# Patient Record
Sex: MALE | Race: WHITE | Employment: OTHER | URBAN - METROPOLITAN AREA
[De-identification: names, ages, dates, MRNs, and addresses within clinical notes are randomized per-mention and may not be internally consistent; named-entity substitution may affect disease eponyms.]

---

## 2017-01-18 RX ORDER — PRAVASTATIN SODIUM 40 MG
TABLET ORAL
Qty: 90 TABLET | Refills: 2 | Status: SHIPPED | OUTPATIENT
Start: 2017-01-18 | End: 2017-10-11 | Stop reason: SDUPTHER

## 2017-03-13 ENCOUNTER — HOSPITAL ENCOUNTER (OUTPATIENT)
Dept: ULTRASOUND IMAGING | Age: 82
Discharge: HOME | End: 2017-03-13
Attending: FAMILY MEDICINE | Admitting: FAMILY MEDICINE

## 2017-03-13 DIAGNOSIS — N18.30 CHRONIC KIDNEY DISEASE, STAGE III (MODERATE) (HCC): ICD-10-CM

## 2017-03-13 DIAGNOSIS — N18.3 CRD (CHRONIC RENAL DISEASE), STAGE 3 (MODERATE): ICD-10-CM

## 2017-04-06 ENCOUNTER — OFFICE VISIT (OUTPATIENT)
Dept: CARDIOLOGY CLINIC | Age: 82
End: 2017-04-06

## 2017-04-06 VITALS
SYSTOLIC BLOOD PRESSURE: 134 MMHG | DIASTOLIC BLOOD PRESSURE: 62 MMHG | BODY MASS INDEX: 24.05 KG/M2 | HEART RATE: 70 BPM | HEIGHT: 70 IN | WEIGHT: 168 LBS

## 2017-04-06 DIAGNOSIS — I10 ESSENTIAL HYPERTENSION: ICD-10-CM

## 2017-04-06 DIAGNOSIS — I25.10 CORONARY ARTERY DISEASE INVOLVING NATIVE CORONARY ARTERY OF NATIVE HEART WITHOUT ANGINA PECTORIS: Primary | ICD-10-CM

## 2017-04-06 DIAGNOSIS — E78.00 HYPERCHOLESTEREMIA: ICD-10-CM

## 2017-04-06 PROCEDURE — G8598 ASA/ANTIPLAT THER USED: HCPCS | Performed by: INTERNAL MEDICINE

## 2017-04-06 PROCEDURE — 1123F ACP DISCUSS/DSCN MKR DOCD: CPT | Performed by: INTERNAL MEDICINE

## 2017-04-06 PROCEDURE — G8420 CALC BMI NORM PARAMETERS: HCPCS | Performed by: INTERNAL MEDICINE

## 2017-04-06 PROCEDURE — 99214 OFFICE O/P EST MOD 30 MIN: CPT | Performed by: INTERNAL MEDICINE

## 2017-04-06 PROCEDURE — 1036F TOBACCO NON-USER: CPT | Performed by: INTERNAL MEDICINE

## 2017-04-06 PROCEDURE — G8427 DOCREV CUR MEDS BY ELIG CLIN: HCPCS | Performed by: INTERNAL MEDICINE

## 2017-04-06 PROCEDURE — 4040F PNEUMOC VAC/ADMIN/RCVD: CPT | Performed by: INTERNAL MEDICINE

## 2017-04-06 RX ORDER — LIDOCAINE AND PRILOCAINE 25; 25 MG/G; MG/G
CREAM TOPICAL
Refills: 5 | COMMUNITY
Start: 2017-03-23 | End: 2018-03-30 | Stop reason: ALTCHOICE

## 2017-04-06 RX ORDER — VARDENAFIL HCL 10 MG
TABLET,DISINTEGRATING ORAL
Refills: 4 | COMMUNITY
Start: 2017-02-27 | End: 2020-12-02 | Stop reason: ALTCHOICE

## 2017-05-12 ENCOUNTER — TELEPHONE (OUTPATIENT)
Dept: ORTHOPEDIC SURGERY | Age: 82
End: 2017-05-12

## 2017-07-19 ENCOUNTER — TELEPHONE (OUTPATIENT)
Dept: CARDIOLOGY CLINIC | Age: 82
End: 2017-07-19

## 2017-07-27 ENCOUNTER — HOSPITAL ENCOUNTER (OUTPATIENT)
Dept: NON INVASIVE DIAGNOSTICS | Age: 82
Discharge: HOME | End: 2017-07-27
Attending: FAMILY MEDICINE | Admitting: FAMILY MEDICINE

## 2017-07-27 DIAGNOSIS — R22.2 SWELLING, MASS, OR LUMP IN CHEST: ICD-10-CM

## 2017-07-27 LAB
LV EF: 58 %
LVEF MODALITY: NORMAL

## 2017-09-20 ENCOUNTER — TELEPHONE (OUTPATIENT)
Dept: CARDIOLOGY CLINIC | Age: 82
End: 2017-09-20

## 2017-10-11 ENCOUNTER — OFFICE VISIT (OUTPATIENT)
Dept: CARDIOLOGY CLINIC | Age: 82
End: 2017-10-11

## 2017-10-11 VITALS
BODY MASS INDEX: 23.77 KG/M2 | WEIGHT: 166 LBS | HEIGHT: 70 IN | OXYGEN SATURATION: 98 % | DIASTOLIC BLOOD PRESSURE: 78 MMHG | SYSTOLIC BLOOD PRESSURE: 124 MMHG | HEART RATE: 68 BPM

## 2017-10-11 DIAGNOSIS — I10 ESSENTIAL HYPERTENSION, BENIGN: ICD-10-CM

## 2017-10-11 DIAGNOSIS — E78.5 OTHER AND UNSPECIFIED HYPERLIPIDEMIA: ICD-10-CM

## 2017-10-11 DIAGNOSIS — I25.10 ATHEROSCLEROSIS OF NATIVE CORONARY ARTERY OF NATIVE HEART WITHOUT ANGINA PECTORIS: Primary | ICD-10-CM

## 2017-10-11 PROCEDURE — G8420 CALC BMI NORM PARAMETERS: HCPCS | Performed by: NURSE PRACTITIONER

## 2017-10-11 PROCEDURE — 93000 ELECTROCARDIOGRAM COMPLETE: CPT | Performed by: NURSE PRACTITIONER

## 2017-10-11 PROCEDURE — 1036F TOBACCO NON-USER: CPT | Performed by: NURSE PRACTITIONER

## 2017-10-11 PROCEDURE — G8427 DOCREV CUR MEDS BY ELIG CLIN: HCPCS | Performed by: NURSE PRACTITIONER

## 2017-10-11 PROCEDURE — 4040F PNEUMOC VAC/ADMIN/RCVD: CPT | Performed by: NURSE PRACTITIONER

## 2017-10-11 PROCEDURE — 99214 OFFICE O/P EST MOD 30 MIN: CPT | Performed by: NURSE PRACTITIONER

## 2017-10-11 PROCEDURE — G8484 FLU IMMUNIZE NO ADMIN: HCPCS | Performed by: NURSE PRACTITIONER

## 2017-10-11 PROCEDURE — G8598 ASA/ANTIPLAT THER USED: HCPCS | Performed by: NURSE PRACTITIONER

## 2017-10-11 PROCEDURE — 1123F ACP DISCUSS/DSCN MKR DOCD: CPT | Performed by: NURSE PRACTITIONER

## 2017-10-11 RX ORDER — PRAVASTATIN SODIUM 40 MG
20 TABLET ORAL DAILY
Qty: 90 TABLET | Refills: 3 | Status: SHIPPED | OUTPATIENT
Start: 2017-10-11 | End: 2018-12-07 | Stop reason: SDUPTHER

## 2017-10-11 NOTE — PROGRESS NOTES
 lamotrigine (LAMICTAL) 100 MG tablet Take 100 mg by mouth 2 times daily       lithium 300 MG capsule Take 300 mg by mouth 2 times daily (with meals). No current facility-administered medications for this visit. REVIEW OF SYSTEMS:   CONSTITUTIONAL: No major weight gain or loss, fatigue, weakness, night sweats or fever. There's been no change in energy level, sleep pattern, or activity level. HEENT: No new vision difficulties or ringing in the ears. RESPIRATORY: No new SOB, PND, orthopnea or cough. CARDIOVASCULAR: See HPI  GI: No nausea, vomiting, diarrhea, constipation, abdominal pain or changes in bowel habits. : No urinary frequency, urgency, incontinence hematuria or dysuria. SKIN: No cyanosis or skin lesions. MUSCULOSKELETAL: No new muscle or joint pain. NEUROLOGICAL: No syncope or TIA-like symptoms. PSYCHIATRIC: No anxiety, pain, insomnia or depression    Objective:   PHYSICAL EXAM:        VITALS:    Vitals:    10/11/17 1008   BP: 124/78   Pulse: 68   SpO2: 98%         CONSTITUTIONAL: Cooperative, no apparent distress, and appears well nourished / developed  NEUROLOGIC:  Awake and orientated to person, place and time. PSYCH: Calm affect. SKIN: Warm and dry. HEENT: Sclera non-icteric, normocephalic, neck supple, no elevation of JVP, normal carotid pulses with no bruits and thyroid normal size. LUNGS:  No increased work of breathing and clear to auscultation, no crackles or wheezing. CARDIOVASCULAR:  Regular rate and rhythm with no murmurs, gallops, rubs, or abnormal heart sounds, normal PMI. The apical impulses not displaced.  Heart tones are crisp and normal. Cervical veins are not engorged                 JVP less than 8 cm H2O                                                                              The carotid upstroke is normal in amplitude and contour without delay or bruit    ABDOMEN:  Normal bowel sounds, non-distended and non-tender to palpation   EXT: No edema, no calf tenderness. Pulses are present bilaterally. DATA:    Lab Results   Component Value Date    ALT 13 02/15/2017    AST 17 02/15/2017    ALKPHOS 76 12/21/2015    BILITOT <0.2 12/21/2015     Lab Results   Component Value Date    CREATININE 1.27 02/15/2017    BUN 15 02/15/2017     02/15/2017    K 4.3 02/15/2017     02/15/2017    CO2 31 02/15/2017     Lab Results   Component Value Date    TSH 3.29 12/21/2015     Lab Results   Component Value Date    WBC 11.6 (H) 03/27/2014    HGB 14.1 03/27/2014    HCT 42.9 03/27/2014    MCV 93.7 03/27/2014     03/27/2014     No components found for: CHLPL  Lab Results   Component Value Date    TRIG 138 02/15/2017    TRIG 111 03/09/2015    TRIG 193 (H) 11/01/2013     Lab Results   Component Value Date    HDL 48 (L) 02/15/2017    HDL 60 03/09/2015    HDL 49 11/01/2013     Lab Results   Component Value Date    LDLCALC 55 02/15/2017    LDLCALC 63 03/09/2015    LDLCALC 48 11/01/2013     Lab Results   Component Value Date    LABVLDL 22 03/09/2015    LABVLDL 39 11/01/2013    LABVLDL 17 03/16/2013     EKG: 10/11/17 SR with 1st degree block reviewed by me  Assessment:     ~ Coronary artery disease  9/09 MVD- s/p CABG  1/12 Myoview stress test showed normal findings and normal LV function  On Lopressor, ASA, and Pravastatin   Patient has had chest pain with exercise for two weeks  Plan: schedule Myoview stress test    ~ Hypertension  Today's B/P- 124/78  Stable  Continue current medications    ~Hyperlipidemia  On Pravastatin  2/2017: HDL: 48, LDL: 55  Patient  is stable since     Plan:   Schedule Myoview stress test   Decrease Pravastatin to 20 mg daily due to patient request  Follow up in six months/ test dependent    I have addressed the patient's cardiac risk factors and adjusted pharmacologic treatment as needed. In addition, I have reinforced the need for patient directed risk factor modification.     Further evaluation will be based upon the patient's clinical course and testing results. All questions and concerns were addressed to the patient/family. Alternatives to  treatment were discussed. The patient  currently  is not smoking. The risks related to smoking were reviewed with the patient. Recommend maintaining a smoke-free lifestyle. Products available for smoking cessation were discussed. Thank you for allowing to us to participate in the care of Carlos Mccall CNP

## 2017-10-11 NOTE — COMMUNICATION BODY
 lamotrigine (LAMICTAL) 100 MG tablet Take 100 mg by mouth 2 times daily       lithium 300 MG capsule Take 300 mg by mouth 2 times daily (with meals). No current facility-administered medications for this visit. REVIEW OF SYSTEMS:   CONSTITUTIONAL: No major weight gain or loss, fatigue, weakness, night sweats or fever. There's been no change in energy level, sleep pattern, or activity level. HEENT: No new vision difficulties or ringing in the ears. RESPIRATORY: No new SOB, PND, orthopnea or cough. CARDIOVASCULAR: See HPI  GI: No nausea, vomiting, diarrhea, constipation, abdominal pain or changes in bowel habits. : No urinary frequency, urgency, incontinence hematuria or dysuria. SKIN: No cyanosis or skin lesions. MUSCULOSKELETAL: No new muscle or joint pain. NEUROLOGICAL: No syncope or TIA-like symptoms. PSYCHIATRIC: No anxiety, pain, insomnia or depression    Objective:   PHYSICAL EXAM:        VITALS:    Vitals:    10/11/17 1008   BP: 124/78   Pulse: 68   SpO2: 98%         CONSTITUTIONAL: Cooperative, no apparent distress, and appears well nourished / developed  NEUROLOGIC:  Awake and orientated to person, place and time. PSYCH: Calm affect. SKIN: Warm and dry. HEENT: Sclera non-icteric, normocephalic, neck supple, no elevation of JVP, normal carotid pulses with no bruits and thyroid normal size. LUNGS:  No increased work of breathing and clear to auscultation, no crackles or wheezing. CARDIOVASCULAR:  Regular rate and rhythm with no murmurs, gallops, rubs, or abnormal heart sounds, normal PMI. The apical impulses not displaced.  Heart tones are crisp and normal. Cervical veins are not engorged                 JVP less than 8 cm H2O                                                                              The carotid upstroke is normal in amplitude and contour without delay or bruit    ABDOMEN:  Normal bowel sounds, non-distended and non-tender to palpation   EXT: No edema, no calf tenderness. Pulses are present bilaterally. DATA:    Lab Results   Component Value Date    ALT 13 02/15/2017    AST 17 02/15/2017    ALKPHOS 76 12/21/2015    BILITOT <0.2 12/21/2015     Lab Results   Component Value Date    CREATININE 1.27 02/15/2017    BUN 15 02/15/2017     02/15/2017    K 4.3 02/15/2017     02/15/2017    CO2 31 02/15/2017     Lab Results   Component Value Date    TSH 3.29 12/21/2015     Lab Results   Component Value Date    WBC 11.6 (H) 03/27/2014    HGB 14.1 03/27/2014    HCT 42.9 03/27/2014    MCV 93.7 03/27/2014     03/27/2014     No components found for: CHLPL  Lab Results   Component Value Date    TRIG 138 02/15/2017    TRIG 111 03/09/2015    TRIG 193 (H) 11/01/2013     Lab Results   Component Value Date    HDL 48 (L) 02/15/2017    HDL 60 03/09/2015    HDL 49 11/01/2013     Lab Results   Component Value Date    LDLCALC 55 02/15/2017    LDLCALC 63 03/09/2015    LDLCALC 48 11/01/2013     Lab Results   Component Value Date    LABVLDL 22 03/09/2015    LABVLDL 39 11/01/2013    LABVLDL 17 03/16/2013     EKG: 10/11/17 SR with 1st degree block reviewed by me  Assessment:     ~ Coronary artery disease  9/09 MVD- s/p CABG  1/12 Myoview stress test showed normal findings and normal LV function  On Lopressor, ASA, and Pravastatin   Patient has had chest pain with exercise for two weeks  Plan: schedule Myoview stress test    ~ Hypertension  Today's B/P- 124/78  Stable  Continue current medications    ~Hyperlipidemia  On Pravastatin  2/2017: HDL: 48, LDL: 55  Patient  is stable since     Plan:   Schedule Myoview stress test   Decrease Pravastatin to 20 mg daily due to patient request  Follow up in six months/ test dependent    I have addressed the patient's cardiac risk factors and adjusted pharmacologic treatment as needed. In addition, I have reinforced the need for patient directed risk factor modification.     Further evaluation will be based upon the patient's clinical course and testing results. All questions and concerns were addressed to the patient/family. Alternatives to  treatment were discussed. The patient  currently  is not smoking. The risks related to smoking were reviewed with the patient. Recommend maintaining a smoke-free lifestyle. Products available for smoking cessation were discussed. Thank you for allowing to us to participate in the care of Regla Mccall CNP

## 2017-10-11 NOTE — LETTER
[address] [facility]  [address] [address] [address] [address]  Phone: [phone]  Fax: [address], NP        October 11, 2017     Diane Irizarry, [address] [facility] [address]    Patient: Reginald Kang  MR Number: [id number]  YOB: 1935  Date of Visit: 10/11/2017    Dear Dr. Diane Irizarry:    Thank you for the request for consultation for Bianca Padmini to me for the evaluation of CAD. Below are the relevant portions of my assessment and plan of care. [facility]     Outpatient Follow Up Note    CHIEF COMPLAINT / HPI:  Follow Up secondary to coronary artery disease/ HTN/ Hyperlipidemia         Reginald Kang is 80 y.o. male who presents today for a routine follow up  related to the above mentioned issues. Subjective:   Since the time of last office visit, the patient admits their symptoms have changed. At today's visit patient is doing well. He has had chest pain with exercise for two weeks. Today's EKG showed SR with 1st degree block reviewed by me. He denies any  palpitations, SOB, dizziness, or edema. With regard to medication therapy the patient has been compliant with prescribed regimen. They have tolerated therapy to date.      Past Medical History:   Diagnosis Date    CAD (coronary artery disease) 2009    CABG    Diabetes insipidus ([address]     Hypercholesteremia     Hypertension      Social History:    History   Smoking Status    Never Smoker   Smokeless Tobacco    Never Used     Current Medications:  Current Outpatient Prescriptions   Medication Sig Dispense Refill    metoprolol tartrate (LOPRESSOR) 25 MG tablet TAKES 1/2 TABLET TWICE A DAY 90 tablet 3    STAXYN 10 MG TBDP PLACE 1 EACH UNDER THE TONGUE DAILY AS NEEDED.  4    lidocaine-prilocaine (EMLA) 2.5-2.5 % cream APPLY TOPICALLY AS NEEDED.  5    Influenza Vac Split High-Dose (FLUZONE HIGH-DOSE) 0.5 ML NADIYA injection TO BE ADMINISTERED BY PHARMACIST FOR IMMUNIZATION      pravastatin (PRAVACHOL) 40 MG tablet TAKE 1 TABLET BY MOUTH DAILY 90 tablet 2    levothyroxine (SYNTHROID) 25 MCG tablet Take 25 mcg by mouth Daily.  omeprazole (PRILOSEC) 20 MG capsule Take 40 mg by mouth 2 times daily       vitamin D (CHOLECALCIFEROL) 1000 UNIT TABS tablet Take 500 Units by mouth daily.  Multiple Vitamin (MULTIVITAMIN PO) Take  by mouth daily.  aspirin 81 MG EC tablet Take 81 mg by mouth daily.  gabapentin (NEURONTIN) 300 MG capsule Take 300 mg by mouth daily.  lamotrigine (LAMICTAL) 100 MG tablet Take 100 mg by mouth 2 times daily       lithium 300 MG capsule Take 300 mg by mouth 2 times daily (with meals). No current facility-administered medications for this visit. REVIEW OF SYSTEMS:   CONSTITUTIONAL: No major weight gain or loss, fatigue, weakness, night sweats or fever. There's been no change in energy level, sleep pattern, or activity level. HEENT: No new vision difficulties or ringing in the ears. RESPIRATORY: No new SOB, PND, orthopnea or cough. CARDIOVASCULAR: See HPI  GI: No nausea, vomiting, diarrhea, constipation, abdominal pain or changes in bowel habits. : No urinary frequency, urgency, incontinence hematuria or dysuria. SKIN: No cyanosis or skin lesions. MUSCULOSKELETAL: No new muscle or joint pain. NEUROLOGICAL: No syncope or TIA-like symptoms. PSYCHIATRIC: No anxiety, pain, insomnia or depression    Objective:   PHYSICAL EXAM:        VITALS:    Vitals:    10/11/17 1008   BP: 124/78   Pulse: 68   SpO2: 98%         CONSTITUTIONAL: Cooperative, no apparent distress, and appears well nourished / developed  NEUROLOGIC:  Awake and orientated to person, place and time. PSYCH: Calm affect. SKIN: Warm and dry. HEENT: Sclera non-icteric, normocephalic, neck supple, no elevation of JVP, normal carotid pulses with no bruits and thyroid normal size. LUNGS:  No increased work of breathing and clear to auscultation, no crackles or wheezing. CARDIOVASCULAR:  Regular rate and rhythm with no murmurs, gallops, rubs, or abnormal heart sounds, normal PMI. The apical impulses not displaced. Heart tones are crisp and normal. Cervical veins are not engorged                 JVP less than 8 cm H2O                                                                              The carotid upstroke is normal in amplitude and contour without delay or bruit    ABDOMEN:  Normal bowel sounds, non-distended and non-tender to palpation   EXT: No edema, no calf tenderness. Pulses are present bilaterally.     DATA:    Lab Results   Component Value Date    ALT 13 02/15/2017    AST 17 02/15/2017    ALKPHOS 76 12/21/2015    BILITOT <0.2 12/21/2015     Lab Results   Component Value Date    CREATININE 1.27 02/15/2017    BUN 15 02/15/2017     02/15/2017    K 4.3 02/15/2017     02/15/2017    CO2 31 02/15/2017     Lab Results   Component Value Date    TSH 3.29 12/21/2015     Lab Results   Component Value Date    WBC 11.6 (H) 03/27/2014    HGB 14.1 03/27/2014    HCT 42.9 03/27/2014    MCV 93.7 03/27/2014     03/27/2014     No components found for: CHLPL  Lab Results   Component Value Date    TRIG 138 02/15/2017    TRIG 111 03/09/2015    TRIG 193 (H) 11/01/2013     Lab Results   Component Value Date    HDL 48 (L) 02/15/2017    HDL 60 03/09/2015    HDL 49 11/01/2013     Lab Results   Component Value Date    LDLCALC 55 02/15/2017    LDLCALC 63 03/09/2015    LDLCALC 48 11/01/2013     Lab Results   Component Value Date    LABVLDL 22 03/09/2015    LABVLDL 39 11/01/2013    LABVLDL 17 03/16/2013     EKG: 10/11/17 SR with 1st degree block reviewed by me  Assessment:     ~ Coronary artery disease  9/09 MVD- s/p CABG  1/12 Myoview stress test showed normal findings and normal LV function  On Lopressor, ASA, and Pravastatin   Patient has had chest pain with exercise for two weeks Plan: schedule Myoview stress test    ~ Hypertension  Today's B/P- 124/78  Stable  Continue current medications    ~Hyperlipidemia  On Pravastatin  2/2017: HDL: 48, LDL: 55  Patient  is stable since     Plan:   Schedule Myoview stress test   Decrease Pravastatin to 20 mg daily due to patient request  Follow up in six months/ test dependent    I have addressed the patient's cardiac risk factors and adjusted pharmacologic treatment as needed. In addition, I have reinforced the need for patient directed risk factor modification. Further evaluation will be based upon the patient's clinical course and testing results. All questions and concerns were addressed to the patient/family. Alternatives to  treatment were discussed. The patient  currently  is not smoking. The risks related to smoking were reviewed with the patient. Recommend maintaining a smoke-free lifestyle. Products available for smoking cessation were discussed. Thank you for allowing to us to participate in the care of Eileenpadmini Olena. [address] [place] [address]      If you have questions, please do not hesitate to call me. I look forward to following Yin Moon along with you.     Sincerely,        Karma Cullen, DAX

## 2017-10-25 ENCOUNTER — HOSPITAL ENCOUNTER (OUTPATIENT)
Dept: NON INVASIVE DIAGNOSTICS | Age: 82
Discharge: HOME | End: 2017-10-25
Attending: NURSE PRACTITIONER | Admitting: NURSE PRACTITIONER

## 2017-10-25 DIAGNOSIS — R22.2 SWELLING, MASS, OR LUMP IN CHEST: ICD-10-CM

## 2017-10-25 LAB
LV EF: 74 %
LVEF MODALITY: NORMAL

## 2017-11-01 ENCOUNTER — HOSPITAL ENCOUNTER (OUTPATIENT)
Dept: PHYSICAL THERAPY | Age: 82
Discharge: HOME | End: 2017-11-30
Admitting: ORTHOPAEDIC SURGERY

## 2017-11-01 DIAGNOSIS — R22.2 SWELLING, MASS, OR LUMP IN CHEST: ICD-10-CM

## 2017-11-01 ASSESSMENT — PAIN SCALES - GENERAL: PAINLEVEL_OUTOF10: 1

## 2017-11-01 ASSESSMENT — PAIN DESCRIPTION - LOCATION: LOCATION: KNEE

## 2017-11-01 ASSESSMENT — PAIN DESCRIPTION - ORIENTATION: ORIENTATION: LEFT

## 2017-11-01 ASSESSMENT — PAIN DESCRIPTION - PAIN TYPE: TYPE: CHRONIC PAIN

## 2017-11-01 ASSESSMENT — PAIN DESCRIPTION - DESCRIPTORS: DESCRIPTORS: ACHING

## 2017-11-01 ASSESSMENT — PAIN DESCRIPTION - FREQUENCY: FREQUENCY: INTERMITTENT

## 2017-11-01 NOTE — FLOWSHEET NOTE
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 Physical Therapy Certification/Re-Certification Form  Dear Dr. Shanelle Almazan,  The following patient has been evaluated for physical therapy services. Please review the attached evaluation and/or summary of the patient's plan of care, and verify that you agree therapy should continue by signing the attached document and sending it back to our office. Plan of Care/Treatment to date:  [x] Therapeutic Exercise      [] Modalities:  [x] Therapeutic Activity        [] Ultrasound    [] Gait Training        [] Cervical Traction   [x] Neuromuscular Re-education      [] Cold/hotpack    [x] Instruction in HEP        [] Lumbar Traction  [] Manual Therapy        [] Electrical Stimulation            [] Aquatic Therapy        [] Iontophoresis        ? [] Lymphedema management  [] Women's Health     Other:  [] Vestibular Rehab        []    []  Needed     Frequency/Duration:  # Days per week: [x] 1 day # Weeks: [] 1 week [] 5 weeks     [] 2 days? [] 2 weeks [] 6 weeks     [] 3 days   [] 3 weeks [] 7 weeks     [] 4 days   [x] 4 weeks [] 8 weeks    Rehab Potential: [] Excellent [x] Good [] Fair  [] Poor     Electronically signed by:  Aiden Clemente PT    If you have any questions or concerns, please don't hesitate to call.   Thank you for your referral.      Physician Signature:________________________________Date:__________________  By signing above, therapists plan is approved by physician

## 2017-11-01 NOTE — PROGRESS NOTES
Tolerance: Patient Tolerated treatment well         Plan   Plan  Times per week: 1  Plan weeks: 4 weeks  Current Treatment Recommendations: Strengthening, Balance Training, Endurance Training, Home Exercise Program    G-Code  PT G-Codes  Functional Assessment Tool Used: LEFS  Score: 54  Functional Limitation: Mobility: Walking and moving around  Mobility: Walking and Moving Around Current Status (): At least 20 percent but less than 40 percent impaired, limited or restricted  Mobility: Walking and Moving Around Goal Status (): At least 1 percent but less than 20 percent impaired, limited or restricted    OutComes Score                                  LEFS Total Score: 54                  Goals  Long term goals  Time Frame for Long term goals : 4  Long term goal 1: Pt will be I in HEP to perform at home and at the gym  Long term goal 2: Pt's hip strength will improve to allow better gait and quicker return from surgery.    Patient Goals   Patient goals : improve leg strength       Therapy Time   Individual Concurrent Group Co-treatment   Time In 1339         Time Out 1429         Minutes 50         Timed Code Treatment Minutes: [address] [facility],

## 2017-11-01 NOTE — FLOWSHEET NOTE
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 Tracking Information:  Physician Information Referring Practitioner: Mansi Sood     Plan of Care Sent Date: 17 Signed Received:    Visit Count / Total Visits      Insurance Approved Visits  /  Approved Dates:     Insurance Information PT Insurance Information: medicare     Progress Note/G-codes   [x]  Yes  []  No Next Due:      Pain level: 1/10     Subjective: see eval     Objective:   Observation: Pt limping with decreased wt bearing on LLE   Test measurements:      Exercises:  Exercise/Equipment Resistance/Repetitions Other comments   Performed HEP as below. Other Therapeutic Activities: : discussed several exercises done on machines for future use. Will instruct in these next visit.       Home Exercise Program:  : quad sets, SLR x 3 lying down, SLB 3 x 30', HS/hip flexor stretch 3 x 30' Pt given written copy of these exercises     Manual Treatments:      Modalities:      Timed Code Treatment Minutes:  31    Total Treatment Minutes:  43    Treatment/Activity Tolerance:  [x] Patient tolerated treatment well [] Patient limited by fatigue  [] Patient limited by pain  [] Patient limited by other medical complications  [] Other:     Prognosis: [x] Good [] Fair  [] Poor    Patient Requires Follow-up: [x] Yes  [] No    Plan:   [] Continue per plan of care [] Alter current plan (see comments)  [x] Plan of care initiated [] Hold pending MD visit [] Discharge  Plan for Next Session:  Progress HEP in plex    Electronically signed by:  Judy Salas, PT

## 2017-11-15 LAB
ABO GROUPING: NORMAL
ANTIBODY SCREEN: NEGATIVE
BILIRUBIN, URINE: NEGATIVE
BUN BLDV-MCNC: 14 MG/DL (ref 8–26)
CALCIUM SERPL-MCNC: 10.8 MG/DL (ref 8.5–10.5)
CHLORIDE BLD-SCNC: 106 MEQ/L (ref 101–111)
CLARITY: CLEAR
CO2: 28 MEQ/L (ref 24–36)
COLOR: YELLOW
CREAT SERPL-MCNC: 1.31 MG/DL (ref 0.64–1.27)
GFR AFRICAN AMERICAN: >60 ML/MIN/1.73 SQ METER
GFR NON-AFRICAN AMERICAN: 53 ML/MIN/1.73 SQ METER
GLUCOSE BLD-MCNC: 98 MG/DL (ref 70–99)
GLUCOSE URINE: NEGATIVE
HB: SOURCE: ABNORMAL
HCT VFR BLD CALC: 42 % (ref 40–50)
HEMOGLOBIN: 13.9 G/DL (ref 13.5–16.5)
KETONES, URINE: NEGATIVE
LEUKOCYTE ESTERASE, URINE: NEGATIVE
MCH RBC QN AUTO: 31 PG (ref 27–33)
MCHC RBC AUTO-ENTMCNC: 33 G/DL (ref 32–36)
MCV RBC AUTO: 92 FL (ref 82–97)
NITRITE, URINE: NEGATIVE
OSMOLALITY CALCULATION: 282 MOSM/KG (ref 280–300)
PDW BLD-RTO: 13.8 %
PH, URINE: 7.5 (ref 5–8)
PLATELET # BLD: 241 THOU/MCL (ref 140–375)
POTASSIUM SERPL-SCNC: 4.4 MEQ/L (ref 3.6–5.1)
PROTEIN, URINE: NEGATIVE
RBC # BLD: 4.54 MIL/MCL (ref 4.4–5.8)
RBC URINE: NEGATIVE
SODIUM BLD-SCNC: 141 MEQ/L (ref 135–145)
SPECIFIC GRAVITY UA: 1 (ref 1–1.03)
UROBILINOGEN, URINE: <2 MG/DL
WBC # BLD: 10.4 THOU/MCL (ref 3.6–10.5)

## 2017-11-16 ENCOUNTER — TELEPHONE (OUTPATIENT)
Dept: CARDIOLOGY CLINIC | Age: 82
End: 2017-11-16

## 2017-11-16 NOTE — TELEPHONE ENCOUNTER
Please advise. . Last seen 10/11/17 NPKK:  Assessment:      ~ Coronary artery disease  9/09 MVD- s/p CABG  1/12 Myoview stress test showed normal findings and normal LV function  On Lopressor, ASA, and Pravastatin   Patient has had chest pain with exercise for two weeks  Plan: schedule Myoview stress test     ~ Hypertension  Today's B/P- 124/78  Stable  Continue current medications     ~Hyperlipidemia  On Pravastatin  2/2017: HDL: 48, LDL: 55  Patient  is stable since      Plan:   Schedule Myoview stress test   Decrease Pravastatin to 20 mg daily due to patient request  Follow up in six months/ test dependent

## 2017-11-21 ENCOUNTER — TELEPHONE (OUTPATIENT)
Dept: ORTHOPEDIC SURGERY | Age: 82
End: 2017-11-21

## 2017-12-01 ENCOUNTER — HOSPITAL ENCOUNTER (OUTPATIENT)
Dept: OTHER | Age: 82
Discharge: HOME | End: 2017-12-31
Attending: ORTHOPAEDIC SURGERY | Admitting: ORTHOPAEDIC SURGERY

## 2017-12-07 LAB — GLUCOSE BLD-MCNC: 118 MG/DL (ref 70–99)

## 2017-12-29 ENCOUNTER — HOSPITAL ENCOUNTER (OUTPATIENT)
Dept: PHYSICAL THERAPY | Age: 82
Discharge: HOME | End: 2017-12-31
Admitting: ORTHOPAEDIC SURGERY

## 2017-12-29 DIAGNOSIS — R22.2 SWELLING, MASS, OR LUMP IN CHEST: ICD-10-CM

## 2017-12-29 NOTE — PLAN OF CARE
3-4x / day. Other: pt was taken to ER for possible heart problem 10 days ago - severe knee pain when laying on gurney with knees straight (10/10) - pain eased once EMT placed L knee in slight flexion. Relevant Medical History:  Diabetes Insipidous  Quadruple Bypass  OA - knees, C-spine, L-spine  Chronic kidney disease (does not require dialysis)  Depression (at times)  Neuropathy    Functional Disability Index:PT G-Codes  Functional Assessment Tool Used: LEFS  Score: 24/80 = 70% LOF  Functional Limitation: Mobility: Walking and moving around  Mobility: Walking and Moving Around Current Status (): At least 60 percent but less than 80 percent impaired, limited or restricted  Mobility: Walking and Moving Around Goal Status (): At least 20 percent but less than 40 percent impaired, limited or restricted    Pain Scale: current = 4/10  Easing factors: ice, elevation, slight flexion  Provocative factors: knee extension, long sitting, walking     Type: [x]Constant   []Intermittent  []Radiating []Localized []other:     Numbness/Tingling: in feet (neuropathy)    Occupation/School: n/a    Living Status:   Lives alone: No   Stairs: yes   Handrails: yes    Prior Level of Function:Prior to this injury / incident, pt was independent with ADLs and IADLs, household chores, walking, stairs, kneeling and riding a bicycle. OBJECTIVE:   Palpation: general tenderness throughout L knee    Quad tone: fair    Functional Mobility/Transfers: requires assistance of R LE and / or UEs to lift L LE onto tx table. Very uncomfortable in long sitting. Posture: L knee flexion contracture. Bandages/Dressings/Incisions: Incisions clean and dry. No S&S of infection. Gait: pt arrives to PT amb w/ cane w/ moderate to severe antalgia: slow / methodical gait, stiff mechanics (limited in L knee flexion and extension), wide ROSANNA, and very short stride length.      Dermatomes Normal Abnormal Comments   anterior mid-thigh (L2) nt   distal ant thigh/med knee (L3) x     medial lower leg and foot (L4) x     lateral lower leg and foot (L5) x     posterior calf (S1) x     medial calcaneus (S2) x         Myotomes - NT due to recent surgery Normal Abnormal Comments   Hip flexion (L1-L2)      Knee extension (L2-L4)      Dorsiflexion (L4-L5)      Great Toe Ext (L5)      Ankle Eversion (S1-S2)      Ankle PF(S1-S2)          Reflexes - NT due to recent surgery Normal Abnormal Comments   S1-2 Seated achilles      S1-2 Prone knee bend      L3-4 Patellar tendon      Clonus      Babinski           PROM AROM    L R L R   Knee Flexion -10 nt nt 118   Knee Extension 76 EOB nt nt 0       Strength (0-5) - NT due to recent surgery Left Right   Hip Flexion - supine     Hip Flexion - seated     Hip Abduction     Hip Adduction     Quads     Hamstrings          Flexibility - NT due to recent surgery     Hamstrings (90/90)     ITB (Harmony)     Quads (Ely's)     Hip Flexor Cinthya Brandi)          Girth     Mid patella 44.5 39.5   Suprapatellar 43.2 38.0       Joint mobility: patellar   []Normal    [x]Hypo    []Hyper    Orthopedic Special Tests:    Funmi's (-)     Balance: NT due to recent surgery                           [x] Patient history, allergies, meds reviewed. Medical chart reviewed. See intake form. Review Of Systems (ROS):  [x]Performed Review of systems (Integumentary, CardioPulmonary, Neurological) by intake and observation. Intake form has been scanned into medical record. Patient has been instructed to contact their primary care physician regarding ROS issues if not already being addressed at this time.       Co-morbidities/Complexities (which will affect course of rehabilitation):   []None           Arthritic conditions   []Rheumatoid arthritis (M05.9)  [x]Osteoarthritis (M19.91)   Cardiovascular conditions   []Hypertension (I10)  []Hyperlipidemia (E78.5)  []Angina pectoris (I20)  []Atherosclerosis (I70)   Musculoskeletal conditions   []Disc pathology []Congenital spine pathologies   []Prior surgical intervention  []Osteoporosis (M81.8)  []Osteopenia (M85.8)   Endocrine conditions   []Hypothyroid (E03.9)  []Hyperthyroid Gastrointestinal conditions   []Constipation (C88.84)   Metabolic conditions   []Morbid obesity (E66.01)  []Diabetes type 1(E10.65) or 2 (E11.65)   [x]Neuropathy (G60.9)     Pulmonary conditions   []Asthma (J45)  []Coughing   []COPD (J44.9)   Psychological Disorders  []Anxiety (F41.9)  [x]Depression (F32.9)   []Other:   [x]Other:     Chronic kidney disease  Quadruple bypass  Diabetes insipidous  Considerable shaking / tremors of B UEs     Barriers to/and or personal factors that will affect rehab potential:              []Age  []Sex    []Smoker              []Motivation/Lack of Motivation                        [x]Co-Morbidities - see above              []Cognitive Function, education/learning barriers              []Environmental, home barriers              []profession/work barriers  []past PT/medical experience  [x]other: general deconditioning - fatigued very quickly with testing and exercises (even stretches)  Justification:     Falls Risk Assessment (30 days):   [x] Falls Risk assessed and no intervention required. [] Falls Risk assessed and Patient requires intervention due to being higher risk   TUG score (>12s at risk):     [] Falls education provided, including       G-Codes:  PT G-Codes  Functional Assessment Tool Used: LEFS  Score: 24/80 = 70% LOF  Functional Limitation: Mobility: Walking and moving around  Mobility: Walking and Moving Around Current Status (): At least 60 percent but less than 80 percent impaired, limited or restricted  Mobility: Walking and Moving Around Goal Status ():  At least 20 percent but less than 40 percent impaired, limited or restricted    ASSESSMENT:   Functional Impairments:     [x]Noted lumbar/proximal hip/LE joint hypomobility   [x]Decreased LE functional ROM   [x]Decreased core/proximal hip forms)    GOALS:  Patient stated goal: riding a bicycle    Therapist goals for Patient:   Short Term Goals: To be achieved in: 2 weeks  1. Independent in HEP and progression per patient tolerance, in order to prevent re-injury. 2. Patient will have a decrease in pain to facilitate improvement in movement, function, and ADLs as indicated by Functional Deficits. Long Term Goals: To be achieved in: 8-12 weeks  1. Disability index score of 25% or less for the LEFS to assist with reaching prior level of function. 2. Patient will demonstrate increased AROM to at least 0 - 125 to allow for proper joint functioning as indicated by patients Functional Deficits. 3. Patient will demonstrate an increase in Strength to at least 4+/5 as well as good proximal hip strength and control to allow for proper functional mobility as indicated by patients Functional Deficits. 4. Patient will return to functional activities including walking community distances with good mechanics without AD without increased symptoms or restriction. 5. Patient will be able to ascend and descend stairs in his home reciprocally without increased symptoms or restriction. 6. Patient will be able to complete ADLs and light household chores independently without increased symptoms or restriction.         Electronically signed by:  Marquita Fantasy Feudal, PT

## 2018-01-01 ENCOUNTER — HOSPITAL ENCOUNTER (OUTPATIENT)
Dept: PHYSICAL THERAPY | Age: 83
Discharge: HOME | End: 2018-01-31
Attending: ORTHOPAEDIC SURGERY | Admitting: ORTHOPAEDIC SURGERY

## 2018-01-02 ENCOUNTER — HOSPITAL ENCOUNTER (OUTPATIENT)
Dept: PHYSICAL THERAPY | Age: 83
Discharge: HOME | End: 2018-01-02
Admitting: ORTHOPAEDIC SURGERY

## 2018-01-02 DIAGNOSIS — R22.2 SWELLING, MASS, OR LUMP IN CHEST: ICD-10-CM

## 2018-01-02 NOTE — FLOWSHEET NOTE
[facility], [place]    Physical Therapy Daily Treatment Note  Date:  2018    Patient Name:  Adolph Price    :  1935  MRN: [id number]  Medical/Treatment Diagnosis Information:  · Diagnosis: Y75.134 (ICD-10-CM) - Status post total left knee replacement 17  · Treatment Diagnosis: R26.2 - difficulty walking; M25.562 - L knee pain; M25.662 - stiffness of L knee  Insurance/Certification information:  PT Insurance Information: [facility]  Physician Information:  Referring Practitioner: Dr. Enrique Rogers of care signed (Y/N):     Date of Patient follow up with Physician:  18    G-Code (if applicable):      Date G-Code Applied:  17       Progress Note: [x]  Yes  []  No  Next due by: Visit #10       Latex Allergy:  [x]NO      []YES  Preferred Language for Healthcare:   [x]English       []other:    Visit # Insurance Allowable   2 MC cap     Pain level:  6/10     SUBJECTIVE:  States he had some difficulty at home performing the exercises \"but I did them the best I could\". Reports taking \"a couple of Tylenol\" before therapy this morning. Hasn't taken Percocet \"in a couple of days\" due to [address] making him foggy headed, isn't sure that Tylenol is helping with pain. Reports compliance with using ice at home over weekend and states ice seems to help a little. Relevant Medical History:  Diabetes Insipidous  Quadruple Bypass  OA - knees, C-spine, L-spine  Chronic kidney disease (does not require dialysis)  Depression (at times)  Neuropathy    OBJECTIVE: knee flexion w/ heel slides 18-  100 degrees.      Girth (cm) Pre-vaso Post-vaso    L R L R   Mid-patellar 44.5 39.5 nt nt   Suprapatellar 43.2 38.0 nt nt       RESTRICTIONS/PRECAUTIONS: extensive medical history    Exercises/Interventions:     Therapeutic Exercises  Resistance / level Sets/sec Reps Notes   Ankle pumps  1 30    Seated gastroc stretch - towel pull  30\" 5    Seated HS stretch  30\" 5    Heel slides - knee flexion PROM  10\" 10           SLR - flexion  3 10    SLR - abduction  3 10           bridges  2 10 Added 1/2                 Neuromuscular Re-ed / Therapeutic Activities       Quad sets  10\" 10                  Manual Intervention       Knee mobs/PROM       Tib/Fem Mobs       Patella Mobs       Ankle mobs                       Patient Education:  1/2- discussed contacting Dr. Francesco Connell office regarding possible new pain medicine prescription due to effects of Percocet and Tylenol is not controlling pain. Therapeutic Exercise and NMR EXR  [x] (56642) Provided verbal/tactile cueing for activities related to strengthening, flexibility, endurance, ROM for improvements in LE, proximal hip, and core control with self care, mobility, lifting, ambulation.  [] (99270) Provided verbal/tactile cueing for activities related to improving balance, coordination, kinesthetic sense, posture, motor skill, proprioception  to assist with LE, proximal hip, and core control in self care, mobility, lifting, ambulation and eccentric single leg control.      NMR and Therapeutic Activities:    [] (41033 or 41400) Provided verbal/tactile cueing for activities related to improving balance, coordination, kinesthetic sense, posture, motor skill, proprioception and motor activation to allow for proper function of core, proximal hip and LE with self care and ADLs  [] (35156) Gait Re-education- Provided training and instruction to the patient for proper LE, core and proximal hip recruitment and positioning and eccentric body weight control with ambulation re-education including up and down stairs     Home Exercise Program:    [x] (16934) Reviewed/Progressed HEP activities related to strengthening, flexibility, endurance, ROM of core, proximal hip and LE for functional self-care, mobility, lifting and ambulation/stair navigation   [] (32504)Reviewed/Progressed HEP activities related to improving balance, coordination, kinesthetic sense, posture, motor skill, proprioception of core, proximal hip and LE for self care, mobility, lifting, and ambulation/stair navigation      Manual Treatments:  PROM / STM / Oscillations-Mobs:  G-I, II, III, IV (PA's, Inf., Post.)  [] (15691) Provided manual therapy to mobilize LE, proximal hip and/or LS spine soft tissue/joints for the purpose of modulating pain, promoting relaxation,  increasing ROM, reducing/eliminating soft tissue swelling/inflammation/restriction, improving soft tissue extensibility and allowing for proper ROM for normal function with self care, mobility, lifting and ambulation. Modalities:  [x] (63013) Vasopneumatic compression: Utilized vasopneumatic compression to decrease edema / swelling for the purpose of improving mobility and quad tone / recruitment which will allow for increased overall function including but not limited to self-care, transfers, ambulation, and ascending / descending stairs. Modalities:   Vaso x 8'      Charges:  Timed Code Treatment Minutes: 30   Total Treatment Minutes: 75     [] EVAL - LOW (97448)   [] EVAL - MOD (85017)  [] EVAL - HIGH (89829)  [] RE-EVAL (49091)  [x] QH(73708) x  2   [] IONTO   [] NMR (37208) x      [x] VASO  [] Manual (23444) x       [x] Other:  [] TA x       [] Mech Traction (07495)  [] ES(attended) (99055)      [] ES (un) (89339):     GOALS:  Patient stated goal: riding a bicycle     Therapist goals for Patient:   Short Term Goals: To be achieved in: 2 weeks  1. Independent in HEP and progression per patient tolerance, in order to prevent re-injury. 2. Patient will have a decrease in pain to facilitate improvement in movement, function, and ADLs as indicated by Functional Deficits.     Long Term Goals: To be achieved in: 8-12 weeks  1. Disability index score of 25% or less for the LEFS to assist with reaching prior level of function.    2. Patient will demonstrate increased AROM to at least 0 - 125 to

## 2018-01-04 ENCOUNTER — HOSPITAL ENCOUNTER (OUTPATIENT)
Dept: NON INVASIVE DIAGNOSTICS | Age: 83
Discharge: HOME | End: 2018-01-04
Admitting: FAMILY MEDICINE

## 2018-01-04 ENCOUNTER — OFFICE VISIT (OUTPATIENT)
Dept: CARDIOLOGY CLINIC | Age: 83
End: 2018-01-04

## 2018-01-04 VITALS
OXYGEN SATURATION: 96 % | HEIGHT: 70 IN | BODY MASS INDEX: 23.48 KG/M2 | WEIGHT: 164 LBS | DIASTOLIC BLOOD PRESSURE: 62 MMHG | SYSTOLIC BLOOD PRESSURE: 118 MMHG | HEART RATE: 103 BPM

## 2018-01-04 DIAGNOSIS — I25.10 ATHEROSCLEROSIS OF NATIVE CORONARY ARTERY OF NATIVE HEART WITHOUT ANGINA PECTORIS: Primary | ICD-10-CM

## 2018-01-04 DIAGNOSIS — E87.0 HYPEROSMOLALITY AND HYPERNATREMIA: ICD-10-CM

## 2018-01-04 DIAGNOSIS — E78.2 MIXED HYPERLIPIDEMIA: ICD-10-CM

## 2018-01-04 DIAGNOSIS — I10 ESSENTIAL HYPERTENSION, BENIGN: ICD-10-CM

## 2018-01-04 LAB
LV EF: 60 %
LVEF MODALITY: NORMAL

## 2018-01-04 PROCEDURE — 99214 OFFICE O/P EST MOD 30 MIN: CPT | Performed by: NURSE PRACTITIONER

## 2018-01-04 PROCEDURE — 1036F TOBACCO NON-USER: CPT | Performed by: NURSE PRACTITIONER

## 2018-01-04 PROCEDURE — G8427 DOCREV CUR MEDS BY ELIG CLIN: HCPCS | Performed by: NURSE PRACTITIONER

## 2018-01-04 PROCEDURE — G8598 ASA/ANTIPLAT THER USED: HCPCS | Performed by: NURSE PRACTITIONER

## 2018-01-04 PROCEDURE — 4040F PNEUMOC VAC/ADMIN/RCVD: CPT | Performed by: NURSE PRACTITIONER

## 2018-01-04 PROCEDURE — G8420 CALC BMI NORM PARAMETERS: HCPCS | Performed by: NURSE PRACTITIONER

## 2018-01-04 PROCEDURE — G8484 FLU IMMUNIZE NO ADMIN: HCPCS | Performed by: NURSE PRACTITIONER

## 2018-01-04 PROCEDURE — 1123F ACP DISCUSS/DSCN MKR DOCD: CPT | Performed by: NURSE PRACTITIONER

## 2018-01-04 RX ORDER — METOPROLOL SUCCINATE 25 MG/1
TABLET, EXTENDED RELEASE ORAL DAILY
COMMUNITY
Start: 2017-12-15 | End: 2020-12-02 | Stop reason: ALTCHOICE

## 2018-01-04 RX ORDER — DOCUSATE SODIUM 100 MG/1
100 CAPSULE, LIQUID FILLED ORAL 2 TIMES DAILY
COMMUNITY
End: 2018-03-30

## 2018-01-04 RX ORDER — LITHIUM CARBONATE 450 MG
400 TABLET, EXTENDED RELEASE ORAL
COMMUNITY
End: 2018-03-30 | Stop reason: CLARIF

## 2018-01-04 NOTE — PROGRESS NOTES
dysuria. SKIN: No cyanosis or skin lesions. MUSCULOSKELETAL: No new muscle or joint pain. NEUROLOGICAL: No syncope or TIA-like symptoms. PSYCHIATRIC: No anxiety, pain, insomnia or depression    Objective:   PHYSICAL EXAM:    Vitals:    01/04/18 1001 01/04/18 1028   BP: 138/60 118/62   Site: Right Arm Right Arm   Position: Sitting    Cuff Size: Medium Adult Medium Adult   Pulse: 103    SpO2: 96%    Weight: 164 lb (74.4 kg)    Height: 5' 10\" (1.778 m)          VITALS:  /60 (Site: Right Arm, Position: Sitting, Cuff Size: Medium Adult)   Pulse 103   Ht 5' 10\" (1.778 m)   Wt 164 lb (74.4 kg)   SpO2 96%   BMI 23.53 kg/m²     CONSTITUTIONAL: Cooperative, no apparent distress, and appears well nourished / developed  NEUROLOGIC:  Awake and orientated to person, place and time. PSYCH: Calm affect. SKIN: Warm and dry. HEENT: Sclera non-icteric, normocephalic, neck supple, no elevation of JVP, normal carotid pulses with no bruits and thyroid normal size. LUNGS:  No increased work of breathing and clear to auscultation, no crackles or wheezing. CARDIOVASCULAR:  Regular rate 104 and rhythm with no murmurs, gallops, rubs, or abnormal heart sounds, normal PMI. The apical impulses not displaced. Heart tones are crisp and normal                                                                                            Cervical veins are not engorged                 JVP less than 8 cm H2O                                                                              The carotid upstroke is normal in amplitude and contour without delay or bruit    ABDOMEN:  Normal bowel sounds, non-distended and non-tender to palpation   EXT: No edema, no calf tenderness. Pulses are present bilaterally.     DATA:    Lab Results   Component Value Date    ALT 13 02/15/2017    AST 17 02/15/2017    ALKPHOS 76 12/21/2015    BILITOT <0.2 12/21/2015     Lab Results   Component Value Date    CREATININE 1.27

## 2018-01-04 NOTE — LETTER
[address] [facility]  [address]  [address] [address]  Phone: [phone]  Fax: [phone]    Julee Marin NP        January 4, 2018     Claudia Chavarria, [address] [place] [address]    Patient: Sherry Arreguin  MR Number: [id number]  YOB: 1935  Date of Visit: 1/4/2018    Dear Dr. Claudia Chavarria:          [facility]     Outpatient Follow Up Kourtney Tico / HPI: Hospital Follow Up secondary to dehydration     Hospital record has been reviewed  Hospital Courses progressed as follows per discharge summaries:      #1) 12/7 -  12/9/17: S/P: Procedure(s): LEFT TOTAL KNEE ARTHROPLASTY    #2) 12/9 - 12/14/17:  Admitted for hypernatremia 2/2 poor PO intake in setting of diabetes insipidus. Hospital Course:   1. Hypernatremia: 2/2 poor PO intake after TKA in setting of diabetes insipidus. Asymtpomatic. Improved with IVF and transitioned successfully to PO hydration. On day of discharge, paitent tolerating adequate PO volume and NA dontrended and is stable at 149.   2. 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 2/2 chronic lithium use. Patient hydrated as above and started on amiloride, dose titrated to 10 mg PO daily. 3. Abdominal pain / constipation: Suspect 2/2 post-op ileus. LFTs wnl, bili wnl, lipase wnl. No UTI. KUB without evidence of perforated viscus. Resolved with miralax, senna, and smog enema x2.   4. ESTEBAN on CKD: Likely prerenal from decreased intake and increased output. CKD (chronic interstitial nephritis) stage 3, GFR 30-59 ml/min. Treated with hydration as above. Baseline SCr ~1.2,1.7 on discharge. Will order repeat BMP for day after discharge and close f/u ith PCP. 5. 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 Suspect related to chronic Li use. Followed by SHANICE Bustos (sharee). Treated with hydration as above. Baseline Ca range ~11, 11.8 on discharge. 6. S/p L TKA: Completed 12/7/17 w/ Dk Kumar MD ([facility]). Was discharged from [facility] to complete 14 day course of Lovenox (last dose 12/22/17), then . We held Lovenox in setting of reduced renal function. VTE ppx with SQH. Discharged with 7 more days of SQH.  7. Leukocytosis: Patient admitted with WBC to 16. Downtrended during admission. Then up to 17 on day of discharge. Patient asymptomatic, feels well, afebrile, and has no signs of infection. UA clean. No cough. Surgical wound healing well. Will order repeat CBC for day after discharge and close f/u ith PCP. Condition of Patient on Discharge:   Vitals: Blood pressure 141/92, pulse 88, temperature 98.1 °F (36.7 °C), temperature source Oral, resp. rate 18, height 5' 10\" (1.778 m), weight 150 lb 8 oz (68.3 kg), SpO2 100 %. Wt Readings from Last 1 Encounters:   12/14/17 150 lb 8 oz (68.3 kg)     OV note from PCP : 1/2/18:   Leukocytosis, unspecified type: checking to see if resolve. Thus far, normal cultures, urine, chest X-ray. Getting echo in the small chance of culture negative endocarditis. Knee joint does not appear to be infected. - CBC; Future  - Differential; Future       Raoul Chang is [de-identified] y.o. male who presents today for a routine follow up after a recent hospitalization related to the above mentioned issues. He recalls having had a TKR and was asked if anything hurt. He had pain in his left chest and arm which he thought was because he sleeps on his left side. After disch from his surgery, he wasn't hydrated well and returned for hypernatremia    Subjective:   Since the time of discharge, the patient admits their symptoms have improved. He was referred for an echo d/t persistent elevated WBC. He denies significant chest pain. His angina equivalent was CP with arm radiation. He denies recurrence. He hasn't had any more left side/arm discomfort either. There is no SOB/KIRK.  He has swelling in his LLE d/t knee surgery. He has appeared ashen at times and cold intolerance inside the house. He has not ran a temperature. The patient is not experiencing palpitations. He has not had further dizziness since hydrated in the hospital.     These symptoms are improving over the last few weeks. His home BP runs ~ 120/60 - 14/80 ()  With regard to medication therapy the patient has been compliant with prescribed regimen. They have tolerated therapy to date. Current Outpatient Prescriptions   Medication Sig Dispense Refill    metoprolol succinate (TOPROL XL) 25 MG extended release tablet Take 25 mg by mouth daily       lithium (ESKALITH) 450 MG extended release tablet Take 450 mg by mouth every evening      docusate sodium (COLACE) 100 MG capsule Take 100 mg by mouth 2 times daily      oxyCODONE-acetaminophen (PERCOCET) 5-325 MG per tablet Take 0.25 tablets by mouth every 8 hours as needed .  polyethylene glycol (MIRALAX) powder Take 17 g by mouth every other day       pravastatin (PRAVACHOL) 40 MG tablet Take 0.5 tablets by mouth daily 90 tablet 3    levothyroxine (SYNTHROID) 25 MCG tablet Take 25 mcg by mouth Daily.  Multiple Vitamin (MULTIVITAMIN PO) Take  by mouth daily.  aspirin 81 MG EC tablet Take 81 mg by mouth daily.  gabapentin (NEURONTIN) 300 MG capsule Take 300 mg by mouth daily.  lamotrigine (LAMICTAL) 100 MG tablet Take 100 mg by mouth 2 times daily       STAXYN 10 MG TBDP PLACE 1 EACH UNDER THE TONGUE DAILY AS NEEDED.  4    lidocaine-prilocaine (EMLA) 2.5-2.5 % cream APPLY TOPICALLY AS NEEDED.   5       Objective:   PHYSICAL EXAM:    Vitals:    01/04/18 1001 01/04/18 1028   BP: 138/60 118/62   Site: Right Arm Right Arm   Position: Sitting    Cuff Size: Medium Adult Medium Adult   Pulse: 103    SpO2: 96%    Weight: 164 lb (74.4 kg)    Height: 5' 10\" (1.778 m)          VITALS:  /60 (Site: Right Arm, Position: Sitting, Cuff Size: Medium

## 2018-01-04 NOTE — COMMUNICATION BODY
Current Outpatient Prescriptions   Medication Sig Dispense Refill    metoprolol succinate (TOPROL XL) 25 MG extended release tablet Take 25 mg by mouth daily       lithium (ESKALITH) 450 MG extended release tablet Take 450 mg by mouth every evening      docusate sodium (COLACE) 100 MG capsule Take 100 mg by mouth 2 times daily      oxyCODONE-acetaminophen (PERCOCET) 5-325 MG per tablet Take 0.25 tablets by mouth every 8 hours as needed .  polyethylene glycol (MIRALAX) powder Take 17 g by mouth every other day       pravastatin (PRAVACHOL) 40 MG tablet Take 0.5 tablets by mouth daily 90 tablet 3    levothyroxine (SYNTHROID) 25 MCG tablet Take 25 mcg by mouth Daily.  Multiple Vitamin (MULTIVITAMIN PO) Take  by mouth daily.  aspirin 81 MG EC tablet Take 81 mg by mouth daily.  gabapentin (NEURONTIN) 300 MG capsule Take 300 mg by mouth daily.  lamotrigine (LAMICTAL) 100 MG tablet Take 100 mg by mouth 2 times daily       STAXYN 10 MG TBDP PLACE 1 EACH UNDER THE TONGUE DAILY AS NEEDED.  4    lidocaine-prilocaine (EMLA) 2.5-2.5 % cream APPLY TOPICALLY AS NEEDED. 5       Objective:   PHYSICAL EXAM:    Vitals:    01/04/18 1001 01/04/18 1028   BP: 138/60 118/62   Site: Right Arm Right Arm   Position: Sitting    Cuff Size: Medium Adult Medium Adult   Pulse: 103    SpO2: 96%    Weight: 164 lb (74.4 kg)    Height: 5' 10\" (1.778 m)          VITALS:  /60 (Site: Right Arm, Position: Sitting, Cuff Size: Medium Adult)   Pulse 103   Ht 5' 10\" (1.778 m)   Wt 164 lb (74.4 kg)   SpO2 96%   BMI 23.53 kg/m²      CONSTITUTIONAL: Cooperative, no apparent distress, and appears well nourished / developed  NEUROLOGIC:  Awake and orientated to person, place and time. PSYCH: Calm affect. SKIN: Warm and dry. HEENT: Sclera non-icteric, normocephalic, neck supple, no elevation of JVP, normal carotid pulses with no bruits and thyroid normal size.   LUNGS:  No increased work of breathing and July '17    2. Essential hypertension, benign   ~controlled    3. Mixed hyperlipidemia   ~controlled        Patient  is stable since hospital discharge. Plan: Echo as planned by PCP (preliminary: neg vegetation)   ~12/9/17: blood cx: no growth   ~12/14/17: WBC 17.3 >> 1/2/18 : WBC 14   F/U in three months    Thank you for allowing to us to participate in the care of Robbi Delcid.     Vasquez

## 2018-01-05 ENCOUNTER — HOSPITAL ENCOUNTER (OUTPATIENT)
Dept: PHYSICAL THERAPY | Age: 83
Discharge: HOME | End: 2018-01-05
Admitting: ORTHOPAEDIC SURGERY

## 2018-01-05 DIAGNOSIS — R22.2 SWELLING, MASS, OR LUMP IN CHEST: ICD-10-CM

## 2018-01-05 NOTE — FLOWSHEET NOTE
[facility]  Orthopaedics and Sports Rehabilitation, [place]    Physical Therapy Daily Treatment Note  Date:  2018    Patient Name:  Osmel Acosta    :  1935  MRN: [id number]  Medical/Treatment Diagnosis Information:  · Diagnosis: B32.431 (ICD-10-CM) - Status post total left knee replacement 17  · Treatment Diagnosis: R26.2 - difficulty walking; M25.562 - L knee pain; M25.662 - stiffness of L knee  Insurance/Certification information:  PT Insurance Information: [facility] / [employer]  Physician Information:  Referring Practitioner: Dr. Celina De La Cruz of care signed (Y/N): Y    Date of Patient follow up with Physician:  18    G-Code (if applicable):      Date G-Code Applied:  17       Progress Note: []  Yes  [x]  No  Next due by: Visit #10       Latex Allergy:  [x]NO      []YES  Preferred Language for Healthcare:   [x]English       []other:    Visit # Insurance Allowable   3 MC cap     Pain level:  7/10     SUBJECTIVE:  Pt reports knee is \"pretty sore\" today. Medication: took 1/2 Percocet before PT today. HEP: pt reports compliance with HEP 2-3x / day. Relevant Medical History:  Diabetes Insipidous  Quadruple Bypass  OA - knees, C-spine, L-spine  Chronic kidney disease (does not require dialysis)  Depression (at times)  Neuropathy    OBJECTIVE:   :  Pt arrives to PT amb w/ cane w/ moderate antalgia. L knee  PROM AROM   Knee Flexion -8 w/ prop  -2 w/ prop and QS nt   Knee Extension 105 w/ heel slide and OP nt        Girth (cm) Pre-vaso Post-vaso    L R L R   Mid-patellar 44.5 39.5 nt nt   Suprapatellar 43.2 38.0 nt nt       RESTRICTIONS/PRECAUTIONS: extensive medical history    Exercises/Interventions:     Therapeutic Exercises / Therapeutic Activities Resistance / level Sets/sec Reps Notes  Pt requires cuing on all exercises for positioning, mechanics and parameters.     Ankle pumps  1 30    Seated gastroc stretch - towel pull  30\" 5    Seated HS stretch  30\" 5 Heel slides - knee flexion PROM  10\" 10           Bridges   3 10 Re-instructed mechanics with verbal and tactile cuing. SLR - flexion  3 10    SLR - abduction  3 10           Calf raises   3 10                  Neuromuscular Re-ed / Therapeutic Activities       Quad sets  10\" 10    Biodex balance    npv          Manual Intervention       Knee mobs/PROM       Tib/Fem Mobs       Patella Mobs       Ankle mobs                       Patient Education:    Therapeutic Exercise and NMR EXR  [x] (49792) Provided verbal/tactile cueing for activities related to strengthening, flexibility, endurance, ROM for improvements in LE, proximal hip, and core control with self care, mobility, lifting, ambulation. [x] (54639) Provided verbal/tactile cueing for activities related to improving balance, coordination, kinesthetic sense, posture, motor skill, proprioception  to assist with LE, proximal hip, and core control in self care, mobility, lifting, ambulation and eccentric single leg control.      NMR and Therapeutic Activities:    [x] (11165 or 63022) Provided verbal/tactile cueing for activities related to improving balance, coordination, kinesthetic sense, posture, motor skill, proprioception and motor activation to allow for proper function of core, proximal hip and LE with self care and ADLs  [] (87935) Gait Re-education- Provided training and instruction to the patient for proper LE, core and proximal hip recruitment and positioning and eccentric body weight control with ambulation re-education including up and down stairs     Home Exercise Program:    [x] (78019) Reviewed/Progressed HEP activities related to strengthening, flexibility, endurance, ROM of core, proximal hip and LE for functional self-care, mobility, lifting and ambulation/stair navigation   [] (40281)Reviewed/Progressed HEP activities related to improving balance, coordination, kinesthetic sense, posture, motor skill, proprioception of core, proximal hip and LE for self care, mobility, lifting, and ambulation/stair navigation      Manual Treatments:  PROM / STM / Oscillations-Mobs:  G-I, II, III, IV (PA's, Inf., Post.)  [] (95531) Provided manual therapy to mobilize LE, proximal hip and/or LS spine soft tissue/joints for the purpose of modulating pain, promoting relaxation,  increasing ROM, reducing/eliminating soft tissue swelling/inflammation/restriction, improving soft tissue extensibility and allowing for proper ROM for normal function with self care, mobility, lifting and ambulation. Modalities:  [x] (01453) Vasopneumatic compression: Utilized vasopneumatic compression to decrease edema / swelling for the purpose of improving mobility and quad tone / recruitment which will allow for increased overall function including but not limited to self-care, transfers, ambulation, and ascending / descending stairs. Modalities:   Ice x 10'     Charges:  Timed Code Treatment Minutes: 54   Total Treatment Minutes: 75     [] EVAL - LOW (70838)   [] EVAL - MOD (43415)  [] EVAL - HIGH (47471)  [] RE-EVAL (68537)  [x] AE(65370) x  3   [] IONTO   [] NMR (83520) x      [] VASO  [] Manual (02550) x       [] Other: Group  [x] TA x  1    [] Mech Traction (33808)  [] ES(attended) (68506)      [] ES (un) (88906):     GOALS:  Patient stated goal: riding a bicycle     Therapist goals for Patient:   Short Term Goals: To be achieved in: 2 weeks  1. Independent in HEP and progression per patient tolerance, in order to prevent re-injury. 2. Patient will have a decrease in pain to facilitate improvement in movement, function, and ADLs as indicated by Functional Deficits.     Long Term Goals: To be achieved in: 8-12 weeks  1. Disability index score of 25% or less for the LEFS to assist with reaching prior level of function. 2. Patient will demonstrate increased AROM to at least 0 - 125 to allow for proper joint functioning as indicated by patients Functional Deficits.    3. Patient

## 2018-01-08 ENCOUNTER — HOSPITAL ENCOUNTER (OUTPATIENT)
Dept: PHYSICAL THERAPY | Age: 83
Discharge: HOME | End: 2018-01-08
Admitting: ORTHOPAEDIC SURGERY

## 2018-01-08 DIAGNOSIS — R22.2 SWELLING, MASS, OR LUMP IN CHEST: ICD-10-CM

## 2018-01-08 NOTE — FLOWSHEET NOTE
coordination, kinesthetic sense, posture, motor skill, proprioception of core, proximal hip and LE for self care, mobility, lifting, and ambulation/stair navigation      Manual Treatments:  PROM / STM / Oscillations-Mobs:  G-I, II, III, IV (PA's, Inf., Post.)  [] (45583) Provided manual therapy to mobilize LE, proximal hip and/or LS spine soft tissue/joints for the purpose of modulating pain, promoting relaxation,  increasing ROM, reducing/eliminating soft tissue swelling/inflammation/restriction, improving soft tissue extensibility and allowing for proper ROM for normal function with self care, mobility, lifting and ambulation. Modalities:  [x] (88589) Vasopneumatic compression: Utilized vasopneumatic compression to decrease edema / swelling for the purpose of improving mobility and quad tone / recruitment which will allow for increased overall function including but not limited to self-care, transfers, ambulation, and ascending / descending stairs. Modalities:   Ice x 10'     Charges:  Timed Code Treatment Minutes: 25   Total Treatment Minutes: 22     [] EVAL - LOW (18553)   [] EVAL - MOD (20606)  [] EVAL - HIGH (49973)  [] RE-EVAL (13457)  [x] YL(71973) x  1   [] IONTO   [] NMR (34346) x      [] VASO  [] Manual (07451) x       [x] Other: Group  [x] TA x  1    [] Mech Traction (78911)  [] ES(attended) (60646)      [] ES (un) (43025):     GOALS:  Patient stated goal: riding a bicycle     Therapist goals for Patient:   Short Term Goals: To be achieved in: 2 weeks  1. Independent in HEP and progression per patient tolerance, in order to prevent re-injury. 2. Patient will have a decrease in pain to facilitate improvement in movement, function, and ADLs as indicated by Functional Deficits.     Long Term Goals: To be achieved in: 8-12 weeks  1. Disability index score of 25% or less for the LEFS to assist with reaching prior level of function.    2. Patient will demonstrate increased AROM to at least 0 - 125 to allow for proper joint functioning as indicated by patients Functional Deficits. 3. Patient will demonstrate an increase in Strength to at least 4+/5 as well as good proximal hip strength and control to allow for proper functional mobility as indicated by patients Functional Deficits. 4. Patient will return to functional activities including walking community distances with good mechanics without AD without increased symptoms or restriction. 5. Patient will be able to ascend and descend stairs in his home reciprocally without increased symptoms or restriction. 6. Patient will be able to complete ADLs and light household chores independently without increased symptoms or restriction. Progression Towards Functional goals:  [] Patient is progressing as expected towards functional goals listed. [] Progression is slowed due to complexities listed. [] Progression has been slowed due to co-morbidities. [x] Plan just implemented, too soon to assess goals progression  [] Other:     Persisting Functional Limitations/Impairments:  [x]Sitting [x]Standing   [x]Walking [x]Squatting/bending    [x]Stairs [x]ADL's    [x]Transfers []Reaching  [x]Housework []Job related tasks  [x]Driving [x]Sports/Recreation   []Other:    ASSESSMENT: Added [employer] balance to program this date with some difficulty. Pt unable to perform task w/o hand hold assist on rail for safety. Continues to need verbal and tactile cues for proper technique with exercises and demonstrates limited mobility into both flexion and extension. Continue to add exercises as tolerated to improve strength and motion.      Treatment/Activity Tolerance:  [] Patient tolerated treatment well [x] Patient limited by fatique  [x] Patient limited by pain  [] Patient limited by other medical complications  [] Other:     Prognosis: [] Good [x] Fair  [] Poor    Patient Requires Follow-up: [x] Yes  [] No    Return to Play:    [x]  N/A   []  Stage 1: Intro

## 2018-01-15 ENCOUNTER — HOSPITAL ENCOUNTER (OUTPATIENT)
Dept: PHYSICAL THERAPY | Age: 83
Discharge: HOME | End: 2018-01-15
Admitting: ORTHOPAEDIC SURGERY

## 2018-01-15 DIAGNOSIS — R22.2 SWELLING, MASS, OR LUMP IN CHEST: ICD-10-CM

## 2018-01-15 NOTE — FLOWSHEET NOTE
order to prevent re-injury. 2. Patient will have a decrease in pain to facilitate improvement in movement, function, and ADLs as indicated by Functional Deficits.     Long Term Goals: To be achieved in: 8-12 weeks  1. Disability index score of 25% or less for the LEFS to assist with reaching prior level of function. 2. Patient will demonstrate increased AROM to at least 0 - 125 to allow for proper joint functioning as indicated by patients Functional Deficits. 3. Patient will demonstrate an increase in Strength to at least 4+/5 as well as good proximal hip strength and control to allow for proper functional mobility as indicated by patients Functional Deficits. 4. Patient will return to functional activities including walking community distances with good mechanics without AD without increased symptoms or restriction. 5. Patient will be able to ascend and descend stairs in his home reciprocally without increased symptoms or restriction. 6. Patient will be able to complete ADLs and light household chores independently without increased symptoms or restriction. Progression Towards Functional goals:  [] Patient is progressing as expected towards functional goals listed. [] Progression is slowed due to complexities listed. [] Progression has been slowed due to co-morbidities. [x] Plan just implemented, too soon to assess goals progression  [] Other:     Persisting Functional Limitations/Impairments:  [x]Sitting [x]Standing   [x]Walking [x]Squatting/bending    [x]Stairs [x]ADL's    [x]Transfers []Reaching  [x]Housework []Job related tasks  [x]Driving [x]Sports/Recreation   []Other:    ASSESSMENT: Pt demonstrates poor body awareness and quad contraction. Pt with difficulty achieving and maintaining quad contraction, only able to maintain quad contraction for 2-3 seconds. Pt needs considerable, repeated cueing throughout routine for proper mechanics especially with TKE this date.   Added

## 2018-01-24 ENCOUNTER — HOSPITAL ENCOUNTER (OUTPATIENT)
Dept: PHYSICAL THERAPY | Age: 83
Discharge: HOME | End: 2018-01-24
Admitting: ORTHOPAEDIC SURGERY

## 2018-01-24 DIAGNOSIS — R22.2 SWELLING, MASS, OR LUMP IN CHEST: ICD-10-CM

## 2018-01-24 NOTE — FLOWSHEET NOTE
Independent in HEP and progression per patient tolerance, in order to prevent re-injury. 2. Patient will have a decrease in pain to facilitate improvement in movement, function, and ADLs as indicated by Functional Deficits.     Long Term Goals: To be achieved in: 8-12 weeks  1. Disability index score of 25% or less for the LEFS to assist with reaching prior level of function. 2. Patient will demonstrate increased AROM to at least 0 - 125 to allow for proper joint functioning as indicated by patients Functional Deficits. 3. Patient will demonstrate an increase in Strength to at least 4+/5 as well as good proximal hip strength and control to allow for proper functional mobility as indicated by patients Functional Deficits. 4. Patient will return to functional activities including walking community distances with good mechanics without AD without increased symptoms or restriction. 5. Patient will be able to ascend and descend stairs in his home reciprocally without increased symptoms or restriction. 6. Patient will be able to complete ADLs and light household chores independently without increased symptoms or restriction. Progression Towards Functional goals:  [] Patient is progressing as expected towards functional goals listed. [] Progression is slowed due to complexities listed. [] Progression has been slowed due to co-morbidities. [x] Plan just implemented, too soon to assess goals progression  [] Other:     Persisting Functional Limitations/Impairments:  [x]Sitting [x]Standing   [x]Walking [x]Squatting/bending    [x]Stairs [x]ADL's    [x]Transfers []Reaching  [x]Housework []Job related tasks  [x]Driving [x]Sports/Recreation   []Other:    ASSESSMENT: Performed quad sets this date with use of E-stim on VMS setting to assist in activating quad as pt is only able to contract quad and maintain contraction for 2-3 seconds.  Pt continues to need significant cueing through out session for

## 2018-01-31 ENCOUNTER — HOSPITAL ENCOUNTER (OUTPATIENT)
Dept: PHYSICAL THERAPY | Age: 83
Discharge: HOME | End: 2018-02-01
Admitting: ORTHOPAEDIC SURGERY

## 2018-01-31 DIAGNOSIS — R22.2 SWELLING, MASS, OR LUMP IN CHEST: ICD-10-CM

## 2018-01-31 NOTE — FLOWSHEET NOTE
2. Patient will have a decrease in pain to facilitate improvement in movement, function, and ADLs as indicated by Functional Deficits - met 1/31.     Long Term Goals: To be achieved in: 8-12 weeks  1. Disability index score of 25% or less for the LEFS to assist with reaching prior level of function. 2. Patient will demonstrate increased AROM to at least 0 - 125 to allow for proper joint functioning as indicated by patients Functional Deficits. 3. Patient will demonstrate an increase in Strength to at least 4+/5 as well as good proximal hip strength and control to allow for proper functional mobility as indicated by patients Functional Deficits. 4. Patient will return to functional activities including walking community distances with good mechanics without AD without increased symptoms or restriction. 5. Patient will be able to ascend and descend stairs in his home reciprocally without increased symptoms or restriction. 6. Patient will be able to complete ADLs and light household chores independently without increased symptoms or restriction. Progression Towards Functional goals:  [x] Patient is progressing as expected towards functional goals listed. [] Progression is slowed due to complexities listed. [] Progression has been slowed due to co-morbidities. [] Plan just implemented, too soon to assess goals progression  [] Other:     Persisting Functional Limitations/Impairments:  []Sitting [x]Standing   [x]Walking [x]Squatting/bending    [x]Stairs [x]ADL's    []Transfers []Reaching  [x]Housework []Job related tasks  [x]Driving [x]Sports/Recreation   []Other:    ASSESSMENT: Pt tolerated tx well. Challenged by progressions (lynette the addition of ankle weights), but no c/o increased knee pain. PROM knee flexion increased with use of ERMI. Pt encouraged to continue using cane for ambulation.  Pt will continue to benefit from skilled PT for progression of ROM, strength, endurance and NM

## 2018-02-01 ENCOUNTER — HOSPITAL ENCOUNTER (OUTPATIENT)
Dept: PHYSICAL THERAPY | Age: 83
Discharge: HOME | End: 2018-02-28
Attending: ORTHOPAEDIC SURGERY | Admitting: ORTHOPAEDIC SURGERY

## 2018-02-08 ENCOUNTER — HOSPITAL ENCOUNTER (OUTPATIENT)
Dept: PHYSICAL THERAPY | Age: 83
Discharge: HOME | End: 2018-02-09
Admitting: ORTHOPAEDIC SURGERY

## 2018-02-08 DIAGNOSIS — R22.2 SWELLING, MASS, OR LUMP IN CHEST: ICD-10-CM

## 2018-02-08 NOTE — FLOWSHEET NOTE
[]Other:    ASSESSMENT: Pt tolerated tx well. Challenged by progressions, but no c/o increased knee pain. Pt unsteady toward end of tx session due to fatigue (especially during FSU and Biodex balance). PROM knee flexion continues to gradually increase with use of ERMI. Pt encouraged to continue using cane at all times for ambulation. Pt will continue to benefit from skilled PT for progression of ROM, strength, endurance and NM re-ed which will allow for normalization of gait, weaning off cane, and an increase in overall function. Treatment/Activity Tolerance:  [x] Patient tolerated treatment well [x] Patient limited by fatique  [] Patient limited by pain  [] Patient limited by other medical complications  [] Other:     Prognosis: [] Good [x] Fair  [] Poor    Patient Requires Follow-up: [x] Yes  [] No    Return to Play:    [x]  N/A   []  Stage 1: Intro to Strength   []  Stage 2: Return to Run and Strength   []  Stage 3: Return to Jump and Strength   []  Stage 4: Dynamic Strength and Agility   []  Stage 5: Sport Specific Training   []  Ready to Return to Play, Meets All Above Stages   []  Not Ready for Return to Sports   Comments:            PLAN: PT 2x / wk. NPV - increase sets / reps on FSU. Add resisted side-stepping.    [x] Continue per plan of care [] Alter current plan (see comments)  [] Plan of care initiated [] Hold pending MD visit [] Discharge    Electronically signed by: Amina Teran DPT  Treatment assisted by Issa Cruz PT

## 2018-02-15 ENCOUNTER — HOSPITAL ENCOUNTER (OUTPATIENT)
Dept: PHYSICAL THERAPY | Age: 83
Discharge: HOME | End: 2018-02-16
Admitting: ORTHOPAEDIC SURGERY

## 2018-02-15 DIAGNOSIS — R22.2 SWELLING, MASS, OR LUMP IN CHEST: ICD-10-CM

## 2018-02-15 NOTE — FLOWSHEET NOTE
function of core, proximal hip and LE with self care and ADLs  [] (10827) Gait Re-education- Provided training and instruction to the patient for proper LE, core and proximal hip recruitment and positioning and eccentric body weight control with ambulation re-education including up and down stairs     Home Exercise Program:    [x] (36642) Reviewed/Progressed HEP activities related to strengthening, flexibility, endurance, ROM of core, proximal hip and LE for functional self-care, mobility, lifting and ambulation/stair navigation   [] (30137)Reviewed/Progressed HEP activities related to improving balance, coordination, kinesthetic sense, posture, motor skill, proprioception of core, proximal hip and LE for self care, mobility, lifting, and ambulation/stair navigation      Manual Treatments:  PROM / STM / Oscillations-Mobs:  G-I, II, III, IV (PA's, Inf., Post.)  [] (11835) Provided manual therapy to mobilize LE, proximal hip and/or LS spine soft tissue/joints for the purpose of modulating pain, promoting relaxation,  increasing ROM, reducing/eliminating soft tissue swelling/inflammation/restriction, improving soft tissue extensibility and allowing for proper ROM for normal function with self care, mobility, lifting and ambulation. Modalities:  [x] (16579) Vasopneumatic compression: Utilized vasopneumatic compression to decrease edema / swelling for the purpose of improving mobility and quad tone / recruitment which will allow for increased overall function including but not limited to self-care, transfers, ambulation, and ascending / descending stairs.         Modalities:   Ice - to go     Charges:  Timed Code Treatment Minutes: 60   Total Treatment Minutes: 85     [] EVAL - LOW (02092)   [] EVAL - MOD (16326)  [] EVAL - HIGH (17976)   [] RE-EVAL (20214)  [x] GM(98395) x  3   [] IONTO   [x] NMR (84411) x  1   [] VASO  [] Manual (01601) x       [] Other: Group  [] TA x       [] Mech Traction (26311)  [] ES(attended) c/o increased knee pain. Pt unsteady toward end of tx session due to fatigue. No significant change in knee flexion PROM (as compared to last PT visit). Pt encouraged to continue using cane at all times for ambulation and for balance. Pt will continue to benefit from skilled PT for progression of ROM, strength, endurance and NM re-ed which will allow for normalization of gait, weaning off cane, and an increase in overall function. Treatment/Activity Tolerance:  [x] Patient tolerated treatment well [x] Patient limited by fatique  [] Patient limited by pain  [] Patient limited by other medical complications  [] Other:     Prognosis: [] Good [x] Fair  [] Poor    Patient Requires Follow-up: [x] Yes  [] No    Return to Play:    [x]  N/A   []  Stage 1: Intro to Strength   []  Stage 2: Return to Run and Strength   []  Stage 3: Return to Jump and Strength   []  Stage 4: Dynamic Strength and Agility   []  Stage 5: Sport Specific Training   []  Ready to Return to Play, Meets All Above Stages   []  Not Ready for Return to Sports   Comments:            PLAN: PT 2x / wk. Progress as tolerated.    [x] Continue per plan of care [] Alter current plan (see comments)  [] Plan of care initiated [] Hold pending MD visit [] Discharge    Electronically signed by: Byron Salinas PT, DPT

## 2018-02-23 ENCOUNTER — HOSPITAL ENCOUNTER (OUTPATIENT)
Dept: PHYSICAL THERAPY | Age: 83
Discharge: HOME | End: 2018-02-24
Admitting: ORTHOPAEDIC SURGERY

## 2018-02-23 DIAGNOSIS — R22.2 SWELLING, MASS, OR LUMP IN CHEST: ICD-10-CM

## 2018-02-23 NOTE — FLOWSHEET NOTE
positioning, mechanics and parameters. dc   Calf stretch - towel pull  30\" 5    Seated HS stretch  30\" 5        HEP: 10\" x 10  2/15: Reviewed for HEP. ERMI - knee flexion PROM  10\" 10               Held    SAQ 2# 3 10    SLR - flexion 2# 3 10     Held    Leg extension - LAQ 2# 3 10 Range: available   Hamstring curls - standing 2# 3 10 Range: available   Calf raises   3 10    Leg press # 3 10 Range: 90/10          Bike- seat on 5  7'                   Neuromuscular Re-ed / Therapeutic Activities           dc   TKE Black  3 10 Emphasis on quad activation to push into extension. Side-stepping w/ cane Blue TB 2 10 B TB @ distal thigh   FSU L1 3 10 Verbal cuing required to lead with L for ascent and lead with R for descent. [employer] balance PS L12 4'  W/ moderate B handrail assist throughout. Manual Intervention       Knee mobs/PROM       Tib/Fem Mobs       Patella Mobs       Ankle mobs                       Patient Education:   Recommended continued use of cane. Updated written HEP instructions (see scanned image in ). Pt to perform all exercises BID. Therapeutic Exercise and NMR EXR  [x] (19525) Provided verbal/tactile cueing for activities related to strengthening, flexibility, endurance, ROM for improvements in LE, proximal hip, and core control with self care, mobility, lifting, ambulation. [x] (81718) Provided verbal/tactile cueing for activities related to improving balance, coordination, kinesthetic sense, posture, motor skill, proprioception  to assist with LE, proximal hip, and core control in self care, mobility, lifting, ambulation and eccentric single leg control.      NMR and Therapeutic Activities:    [x] (68120 or 87282) Provided verbal/tactile cueing for activities related to improving balance, coordination, kinesthetic sense, posture, motor skill, proprioception and motor activation to allow for proper function of core, proximal hip and LE with self care form.  Pt continues to demonstrate weakness with exercises and decreased quad strength/endurance. Motion also continues to be limited on ERMI due to pain and stiffness in knee. Pt will continue to benefit from continued therapy to work on improving motion and strength in knee and to work on mechanics of exercises. Treatment/Activity Tolerance:  [x] Patient tolerated treatment well [x] Patient limited by fatique  [] Patient limited by pain  [] Patient limited by other medical complications  [] Other:     Prognosis: [] Good [x] Fair  [] Poor    Patient Requires Follow-up: [x] Yes  [] No    Return to Play:    [x]  N/A   []  Stage 1: Intro to Strength   []  Stage 2: Return to Run and Strength   []  Stage 3: Return to Jump and Strength   []  Stage 4: Dynamic Strength and Agility   []  Stage 5: Sport Specific Training   []  Ready to Return to Play, Meets All Above Stages   []  Not Ready for Return to Sports   Comments:            PLAN: PT 2x / wk. Progress as tolerated.    [x] Continue per plan of care [] Alter current plan (see comments)  [] Plan of care initiated [] Hold pending MD visit [] Discharge    Electronically signed by: Evi Lopez [id number]

## 2018-02-28 ENCOUNTER — HOSPITAL ENCOUNTER (OUTPATIENT)
Dept: VASCULAR LAB | Age: 83
Discharge: HOME | End: 2018-02-28
Attending: ORTHOPAEDIC SURGERY | Admitting: ORTHOPAEDIC SURGERY

## 2018-02-28 ENCOUNTER — HOSPITAL ENCOUNTER (OUTPATIENT)
Dept: PHYSICAL THERAPY | Age: 83
Discharge: HOME | End: 2018-03-01
Admitting: ORTHOPAEDIC SURGERY

## 2018-02-28 DIAGNOSIS — M79.605 LEFT LEG PAIN: Primary | ICD-10-CM

## 2018-02-28 DIAGNOSIS — M79.89 OTHER SPECIFIED SOFT TISSUE DISORDERS (CODE): ICD-10-CM

## 2018-02-28 DIAGNOSIS — R22.2 SWELLING, MASS, OR LUMP IN CHEST: ICD-10-CM

## 2018-03-01 ENCOUNTER — HOSPITAL ENCOUNTER (OUTPATIENT)
Dept: PHYSICAL THERAPY | Age: 83
Discharge: HOME | End: 2018-03-31
Attending: ORTHOPAEDIC SURGERY | Admitting: ORTHOPAEDIC SURGERY

## 2018-03-09 ENCOUNTER — HOSPITAL ENCOUNTER (OUTPATIENT)
Dept: PHYSICAL THERAPY | Age: 83
Discharge: HOME | End: 2018-03-10
Admitting: ORTHOPAEDIC SURGERY

## 2018-03-09 DIAGNOSIS — R22.2 SWELLING, MASS, OR LUMP IN CHEST: ICD-10-CM

## 2018-03-09 NOTE — PLAN OF CARE
applicable):      Date G-Code Applied:  12/29/17  PT G-Codes  Functional Assessment Tool Used: LEFS  Score: 56% limitation  Functional Limitation: Mobility: Walking and moving around  Mobility: Walking and Moving Around Current Status (): At least 40 percent but less than 60 percent impaired, limited or restricted  Mobility: Walking and Moving Around Goal Status (): At least 20 percent but less than 40 percent impaired, limited or restricted    Progress Note: [x]  Yes  []  No  Next due by: Visit #21     Latex Allergy:  [x]NO      []YES  Preferred Language for Healthcare:   [x]English       []other:    Visit # Insurance Allowable   10 - 2018    1-2017 [facility] cap     Pain level:  2/10 resting, 6/10 bending     SUBJECTIVE: Pt. Reports that his knee is feeling okay today and his calf is better. Pt. Tiara Congress to referring MD last week due to significant swelling in L calf. Pt. Sent for doppler which was negative for DVT. Pt. Currently wearing compression sock on L lower leg. Pt. Notes that he has fallen 2x since last therapy session without injuring himself. Pt. States that both times he was bending/reaching down toward the floor. HEP: Pt. Reports completing a couple exercises in the HEP. Pt. Notes that he really likes to do the heel slides because he can feel that one stretching. Pt. Notes that he is sore after completing the exercises so he is icing afterward.      Relevant Medical History:  Diabetes Insipidous  Quadruple Bypass  OA - knees, C-spine, L-spine  Chronic kidney disease (does not require dialysis)   Depression (at times)  Neuropathy    OBJECTIVE:   3/9    Gait: with straight cane for balance, decreased step length, limited full knee extension, slight antalgic gait  Palpation: slight tenderness posterior knee jt  Quad tone: fair    Girth (cm) Pre-vaso Post-vaso    L R L R   Mid-patellar 43.5 38.5 42.5 nt   Suprapatellar 43 39 42.5 nt        PROM AROM     L R L R   Knee Flexion 118 w/ ERMI nt nt   Knee

## 2018-03-14 ENCOUNTER — HOSPITAL ENCOUNTER (OUTPATIENT)
Dept: PHYSICAL THERAPY | Age: 83
Discharge: HOME | End: 2018-03-15
Admitting: ORTHOPAEDIC SURGERY

## 2018-03-14 DIAGNOSIS — R22.2 SWELLING, MASS, OR LUMP IN CHEST: ICD-10-CM

## 2018-03-14 NOTE — FLOWSHEET NOTE
had no signs or symptoms of this prior to surgery. They do not yet have a prognosis or tx plan as they are waiting to get in with a neurologist.      Relevant Medical History:  Diabetes Insipidous  Quadruple Bypass  OA - knees, C-spine, L-spine  Chronic kidney disease (does not require dialysis)   Depression (at times)  Neuropathy    OBJECTIVE:   3/14:  Gait with straight cane: decreased step length, limited full knee extension, mildly ataxic gait  Gait without cane: moderate antalgia with severe antalgia at times, wide ROSANNA, ataxic gait pattern, flexed knees and flat feet B at initial contact, frequent LOB lynette with change of direction. Palpation: no tenderness with palpation in the calf. Quad tone: fair    Girth (cm) Pre-vaso Post-vaso    L R L R   Transmalleolar 30.5 25.5 28.0 nt   Mid-calf 35.5 32.0 34.0 nt   Mid-patellar 43.0 38.0 42.3 nt   Suprapatellar 40.0 38.0 39.3 nt        PROM AROM     L R L R   Knee Flexion 118 w/ ERMI nt nt   Knee Extension -6 resting in prop nt -4 w/ QS         Strength (0-5)  Left Right   Hip Flexion - seated 4-   4   Hip Abduction - seated  4  4   Quads  4  4+   Hamstrings  4-  4+         RESTRICTIONS/PRECAUTIONS: extensive medical history    Exercises/Interventions:     Therapeutic Exercises / Therapeutic Activities Resistance / level Sets/sec Reps Notes  Pt requires cuing on most exercises for positioning, mechanics and parameters. dc   Calf stretch - incline  30\" 5    Seated HS stretch  30\" 5        HEP: 10\" x 10  2/15: Reviewed for HEP. ERMI - knee flexion PROM  10\" 10               Held    SAQ 3# 3 10 DC npv to focus on functional strengthening   SLR - flexion 3# 3 10 Verbal cuing required to maintain TKE throughout.   DC npv to focus on functional strengthening    SLR - abduction 3# 3 10 DC npv to focus on functional strengthening   Leg extension - LAQ 3# 3 10 Range: available   Hamstring curls - standing 3# 3 10 Range: available   Calf raises  10# (5# each hand) 3 proximal hip and LE with self care and ADLs  [] (35629) Gait Re-education- Provided training and instruction to the patient for proper LE, core and proximal hip recruitment and positioning and eccentric body weight control with ambulation re-education including up and down stairs     Home Exercise Program:    [x] (42505) Reviewed/Progressed HEP activities related to strengthening, flexibility, endurance, ROM of core, proximal hip and LE for functional self-care, mobility, lifting and ambulation/stair navigation   [] (09578)Reviewed/Progressed HEP activities related to improving balance, coordination, kinesthetic sense, posture, motor skill, proprioception of core, proximal hip and LE for self care, mobility, lifting, and ambulation/stair navigation      Manual Treatments:  PROM / STM / Oscillations-Mobs:  G-I, II, III, IV (PA's, Inf., Post.)  [] (39717) Provided manual therapy to mobilize LE, proximal hip and/or LS spine soft tissue/joints for the purpose of modulating pain, promoting relaxation,  increasing ROM, reducing/eliminating soft tissue swelling/inflammation/restriction, improving soft tissue extensibility and allowing for proper ROM for normal function with self care, mobility, lifting and ambulation. Modalities:  [x] (36649) Vasopneumatic compression: Utilized vasopneumatic compression to decrease edema / swelling for the purpose of improving mobility and quad tone / recruitment which will allow for increased overall function including but not limited to self-care, transfers, ambulation, and ascending / descending stairs.         Modalities:   Vaso x 15'    Charges:  Timed Code Treatment Minutes: 55   Total Treatment Minutes: 100     [] EVAL - LOW (26171)   [] EVAL - MOD (20002)  [] EVAL - HIGH (58902)   [] RE-EVAL (12532)  [x] HK(37328) x  2   [] IONTO   [x] NMR (87046) x  1   [x] VASO  [] Manual (87316) x       [] Other: Group  [x] TA x  1    [] Mech Traction (82651)  [] ES(attended) (49152)      []

## 2018-03-28 ENCOUNTER — HOSPITAL ENCOUNTER (OUTPATIENT)
Dept: PHYSICAL THERAPY | Age: 83
Discharge: HOME | End: 2018-03-29
Admitting: ORTHOPAEDIC SURGERY

## 2018-03-28 DIAGNOSIS — R22.2 SWELLING, MASS, OR LUMP IN CHEST: ICD-10-CM

## 2018-03-28 NOTE — FLOWSHEET NOTE
[facility], [place]      Physical Therapy Daily Treatment Note  Date:  3/28/2018    Patient Name:  Clare Mabry    :  1935  MRN: [id number]  Medical/Treatment Diagnosis Information:  · Diagnosis: L75.774 (ICD-10-CM) - Status post total left knee replacement 17  · Treatment Diagnosis: R26.2 - difficulty walking; M25.562 - L knee pain; M25.662 - stiffness of L knee  Insurance/Certification information:  PT Insurance Information: [facility]  Physician Information:  Referring Practitioner: Dr. Renee Spaulding of care signed (Y/N): Y    Date of Patient follow up with Physician:  Not scheduled    G-Code (if applicable):      Date G-Code Applied:  3/9/18       Progress Note: []  Yes  [x]  No  Next due by: Visit #21     Latex Allergy:  [x]NO      []YES  Preferred Language for Healthcare:   [x]English       []other:    Visit # Insurance Allowable   2018 [facility] cap     Pain level:    Knee = 3/10  Lower leg was 9/10 this morning (awakened him). SUBJECTIVE: States he doesn't use cane at home except on occasion, usually at night. Denies any issues currently and states having some soreness in knee currently. Pt reports having a \"loss of balance but I didn't fall\" earlier today when standing up to leave after having lunch at a restaurant. 3/14/18: Other medical info: Pt's wife came back at end of tx session to discuss his progress. She mentioned that he has fallen 2 times in the past week (pt had not mentioned either of these) - both occurred when leaning forward. Pt's wife is concerned about a decline in his balance and the lack of coordination in his gait. Pt consulted with PCP and has had a brain scan. Per pt's wife, they have ruled out a CVA and are thinking that he has Parkinson's. Per pt's wife, he had no signs or symptoms of this prior to surgery.  They do not yet have a prognosis or tx plan as they are waiting to get in with a

## 2018-03-30 ENCOUNTER — OFFICE VISIT (OUTPATIENT)
Dept: CARDIOLOGY CLINIC | Age: 83
End: 2018-03-30

## 2018-03-30 VITALS
WEIGHT: 166 LBS | OXYGEN SATURATION: 95 % | BODY MASS INDEX: 23.77 KG/M2 | SYSTOLIC BLOOD PRESSURE: 122 MMHG | HEIGHT: 70 IN | HEART RATE: 74 BPM | DIASTOLIC BLOOD PRESSURE: 80 MMHG

## 2018-03-30 DIAGNOSIS — I25.10 ATHEROSCLEROSIS OF NATIVE CORONARY ARTERY OF NATIVE HEART WITHOUT ANGINA PECTORIS: Primary | ICD-10-CM

## 2018-03-30 DIAGNOSIS — I10 ESSENTIAL HYPERTENSION, BENIGN: ICD-10-CM

## 2018-03-30 DIAGNOSIS — E78.2 MIXED HYPERLIPIDEMIA: ICD-10-CM

## 2018-03-30 PROCEDURE — 1123F ACP DISCUSS/DSCN MKR DOCD: CPT | Performed by: NURSE PRACTITIONER

## 2018-03-30 PROCEDURE — G8598 ASA/ANTIPLAT THER USED: HCPCS | Performed by: NURSE PRACTITIONER

## 2018-03-30 PROCEDURE — G8420 CALC BMI NORM PARAMETERS: HCPCS | Performed by: NURSE PRACTITIONER

## 2018-03-30 PROCEDURE — 99214 OFFICE O/P EST MOD 30 MIN: CPT | Performed by: NURSE PRACTITIONER

## 2018-03-30 PROCEDURE — G8484 FLU IMMUNIZE NO ADMIN: HCPCS | Performed by: NURSE PRACTITIONER

## 2018-03-30 PROCEDURE — G8427 DOCREV CUR MEDS BY ELIG CLIN: HCPCS | Performed by: NURSE PRACTITIONER

## 2018-03-30 PROCEDURE — 4040F PNEUMOC VAC/ADMIN/RCVD: CPT | Performed by: NURSE PRACTITIONER

## 2018-03-30 PROCEDURE — 1036F TOBACCO NON-USER: CPT | Performed by: NURSE PRACTITIONER

## 2018-03-30 RX ORDER — LITHIUM CARBONATE 150 MG/1
150 CAPSULE ORAL EVERY MORNING
COMMUNITY
End: 2020-12-02 | Stop reason: ALTCHOICE

## 2018-03-30 RX ORDER — LITHIUM CARBONATE 300 MG/1
150 CAPSULE ORAL NIGHTLY
COMMUNITY
End: 2020-12-02 | Stop reason: ALTCHOICE

## 2018-03-30 NOTE — PROGRESS NOTES
presentation of Bakari Wallaceedwindel. Plan:     1. Continue present management   2. F/U in six months     Overall the patient is stable from CV standpoint    I have addresed the patient's cardiac risk factors and adjusted pharmacologic treatment as needed. In addition, I have reinforced the need for patient directed risk factor modification. Further evaluation will be based upon the patient's clinical course and testing results. All questions and concerns were addressed to the patient. Alternatives to my treatment were discussed. The patient is not currently smoking. The risks related to smoking were reviewed with the patient. Recommend maintaining a smoke-free lifestyle. Patient is on a beta-blocker  Patient is not on an ace-i/ARB: neg CHF   Patient is on a statin     Antiplatelet therapy has been recommended / prescribed for this patient. Education conducted on adverse reactions including bleeding was discussed. The patient verbalizes understanding not to stop medications without discussing with us. Discussed exercise: 30-60 minutes 7 days/week  Discussed Low saturated fat diet. Thank you for allowing to us to participate in the care of Bakari Montgomery.     JIM Stewart    Documentation of today's visit sent to PCP

## 2018-03-30 NOTE — PROGRESS NOTES
[facility]     Outpatient Follow Up Note    CHIEF COMPLAINT / HPI: Hospital Follow Up secondary to dehydration     Hospital record has been reviewed  Hospital Courses progressed as follows per discharge summaries:      #1) 12/7 -  12/9/17: S/P: Procedure(s): LEFT TOTAL KNEE ARTHROPLASTY    #2) 12/9 - 12/14/17:  Admitted for hypernatremia 2/2 poor PO intake in setting of diabetes insipidus. Hospital Course:   1. Hypernatremia: 2/2 poor PO intake after TKA in setting of diabetes insipidus. Asymtpomatic. Improved with IVF and transitioned successfully to PO hydration. On day of discharge, paitent tolerating adequate PO volume and NA dontrended and is stable at 149.   2. 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 2/2 chronic lithium use. Patient hydrated as above and started on amiloride, dose titrated to 10 mg PO daily. 3. Abdominal pain / constipation: Suspect 2/2 post-op ileus. LFTs wnl, bili wnl, lipase wnl. No UTI. KUB without evidence of perforated viscus. Resolved with miralax, senna, and smog enema x2.   4. ESTEBAN on CKD: Likely prerenal from decreased intake and increased output. CKD (chronic interstitial nephritis) stage 3, GFR 30-59 ml/min. Treated with hydration as above. Baseline SCr ~1.2,1.7 on discharge. Will order repeat BMP for day after discharge and close f/u ith PCP. 5. 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 Suspect related to chronic Li use. Followed by SHANICE Bustos (sharee). Treated with hydration as above. Baseline Ca range ~11, 11.8 on discharge. 6. S/p L TKA: Completed 12/7/17 w/ Darrell Fountain MD ([facility]). Was discharged from [facility] to complete 14 day course of Lovenox (last dose 12/22/17), then . We held Lovenox in setting of reduced renal function. VTE ppx with SQH. Discharged with 7 more days of SQH.  7. Leukocytosis: Patient admitted with WBC to 16. Downtrended during admission. Then up to 17 on day of discharge.  Patient asymptomatic, feels well, afebrile, and has no signs of infection. UA clean. No cough. Surgical wound healing well. Will order repeat CBC for day after discharge and close f/u ith PCP. Condition of Patient on Discharge:   Vitals: Blood pressure 141/92, pulse 88, temperature 98.1 °F (36.7 °C), temperature source Oral, resp. rate 18, height 5' 10\" (1.778 m), weight 150 lb 8 oz (68.3 kg), SpO2 100 %. Wt Readings from Last 1 Encounters:   12/14/17 150 lb 8 oz (68.3 kg)     OV note from PCP : 1/2/18:   Leukocytosis, unspecified type: checking to see if resolve. Thus far, normal cultures, urine, chest X-ray. Getting echo in the small chance of culture negative endocarditis. Knee joint does not appear to be infected. - CBC; Future  - Differential; Future       Renato Porter is [de-identified] y.o. male who presents today for a routine follow up after a recent hospitalization related to the above mentioned issues. He recalls having had a TKR and was asked if anything hurt. He had pain in his left chest and arm which he thought was because he sleeps on his left side. After disch from his surgery, he wasn't hydrated well and returned for hypernatremia    Subjective:   Since the time of discharge, the patient admits their symptoms have improved. He was referred for an echo d/t persistent elevated WBC. He denies significant chest pain. His angina equivalent was CP with arm radiation. He denies recurrence. He hasn't had any more left side/arm discomfort either. There is no SOB/KIRK. He has swelling in his LLE d/t knee surgery. He has appeared ashen at times and cold intolerance inside the house. He has not ran a temperature. The patient is not experiencing palpitations. He has not had further dizziness since hydrated in the hospital.     These symptoms are improving over the last few weeks. His home BP runs ~ 120/60 - 14/80 ()  With regard to medication therapy the patient has been compliant with prescribed regimen. They have tolerated therapy to date. Past Medical History:   Diagnosis Date    CAD (coronary artery disease) 2009    CABG    Diabetes insipidus ([facility] [address]     Hypercholesteremia     Hypertension      Social History:    History   Smoking Status    Never Smoker   Smokeless Tobacco    Never Used     Current Medications:  Current Outpatient Prescriptions   Medication Sig Dispense Refill    metoprolol succinate (TOPROL XL) 25 MG extended release tablet Take 25 mg by mouth daily       lithium (ESKALITH) 450 MG extended release tablet 400 mg 100mg in the morning and 300mg in the evening      polyethylene glycol (MIRALAX) powder Take 17 g by mouth every other day       pravastatin (PRAVACHOL) 40 MG tablet Take 0.5 tablets by mouth daily 90 tablet 3    STAXYN 10 MG TBDP PLACE 1 EACH UNDER THE TONGUE DAILY AS NEEDED.  4    levothyroxine (SYNTHROID) 25 MCG tablet Take 25 mcg by mouth Daily.  Multiple Vitamin (MULTIVITAMIN PO) Take  by mouth daily.  aspirin 81 MG EC tablet Take 81 mg by mouth daily.  gabapentin (NEURONTIN) 300 MG capsule Take 300 mg by mouth daily.  lamotrigine (LAMICTAL) 100 MG tablet Take 100 mg by mouth 2 times daily       docusate sodium (COLACE) 100 MG capsule Take 100 mg by mouth 2 times daily      oxyCODONE-acetaminophen (PERCOCET) 5-325 MG per tablet Take 0.25 tablets by mouth every 8 hours as needed .  lidocaine-prilocaine (EMLA) 2.5-2.5 % cream APPLY TOPICALLY AS NEEDED.  5     No current facility-administered medications for this visit. REVIEW OF SYSTEMS:   CONSTITUTIONAL: No major weight gain or loss, fatigue, weakness, night sweats or fever. There's been no change in energy level, sleep pattern, or activity level. HEENT: No new vision difficulties or ringing in the ears. RESPIRATORY: No new SOB, PND, orthopnea or cough. CARDIOVASCULAR: See HPI  GI: No nausea, vomiting, diarrhea, constipation, abdominal pain or changes in bowel habits.   : No urinary frequency, urgency, incontinence hematuria or dysuria. SKIN: No cyanosis or skin lesions. MUSCULOSKELETAL: No new muscle or joint pain. NEUROLOGICAL: No syncope or TIA-like symptoms. PSYCHIATRIC: No anxiety, pain, insomnia or depression    Objective:   PHYSICAL EXAM:    Vitals:    03/30/18 1022   BP: 102/60   Site: Right Arm   Position: Sitting   Cuff Size: Medium Adult   Pulse: 74   SpO2: 95%   Weight: 166 lb (75.3 kg)   Height: 5' 10\" (1.778 m)         VITALS:  /60 (Site: Right Arm, Position: Sitting, Cuff Size: Medium Adult)   Pulse 74   Ht 5' 10\" (1.778 m)   Wt 166 lb (75.3 kg)   SpO2 95%   BMI 23.82 kg/m²     CONSTITUTIONAL: Cooperative, no apparent distress, and appears well nourished / developed  NEUROLOGIC:  Awake and orientated to person, place and time. PSYCH: Calm affect. SKIN: Warm and dry. HEENT: Sclera non-icteric, normocephalic, neck supple, no elevation of JVP, normal carotid pulses with no bruits and thyroid normal size. LUNGS:  No increased work of breathing and clear to auscultation, no crackles or wheezing. CARDIOVASCULAR:  Regular rate 104 and rhythm with no murmurs, gallops, rubs, or abnormal heart sounds, normal PMI. The apical impulses not displaced. Heart tones are crisp and normal                                                                                            Cervical veins are not engorged                 JVP less than 8 cm H2O                                                                              The carotid upstroke is normal in amplitude and contour without delay or bruit    ABDOMEN:  Normal bowel sounds, non-distended and non-tender to palpation   EXT: No edema, no calf tenderness. Pulses are present bilaterally.     DATA:    Lab Results   Component Value Date    ALT 13 02/15/2017    AST 17 02/15/2017    ALKPHOS 76 12/21/2015    BILITOT <0.2 12/21/2015     Lab Results   Component Value Date    CREATININE 1.27 02/15/2017    BUN 15 02/15/2017  02/15/2017    K 4.3 02/15/2017     02/15/2017    CO2 31 02/15/2017     LABS: 12/14/17:   Na+ 149 K+ 4.1 chl 117 CO2 21 BUN 23 creatinine 1.79 glu 125   Mg+ 2.8   Calcium 11.9   Urinalysis: neg     12/22/17:    trig 154 HDL 39 LDL 47      1/2/18:   WBC 14.0   Na+ 148 K+ 4.8 chl 109 CO2 30 BUN 18 creatinine 1.53 glu 108    Radiology Review:  Pertinent images / reports were reviewed as a part of this visit and reveals the following:    Last Echo: July '17:  Summary   Left ventricle size is normal.   There is mild concentric left ventricular hypertrophy.   Ejection fraction is visually estimated to be 64-70 %.   Diastolic filling parameters suggests grade I diastolic dysfunction .   Mitral annular calcification is present.   Mild biatrial enlargement.   The aortic root is mildly dilated.   There is mild tricuspid regurgitation with RVSP estimated at 25 mmHg. Myoview: Oct '17:      Summary    Normal myocardial perfusion study.    Normal LV size and systolic function. Assessment:     1. Atherosclerosis of native coronary artery of native heart without angina pectoris   ~s/p CABG '09  ~atypical discomfort during hospitalization and denies recurrence  ~neg nuclear stress Oct '17 as pre-op test  ~no murmur to auscultation today ; unremarkable echo from July '17    2. Essential hypertension, benign   ~controlled    3. Mixed hyperlipidemia   ~controlled        Patient  is stable since hospital discharge. Plan: Echo as planned by PCP (preliminary: neg vegetation)   ~12/9/17: blood cx: no growth   ~12/14/17: WBC 17.3 >> 1/2/18 : WBC 14   F/U in three months    I have addressed the patient's cardiac risk factors and adjusted pharmacologic treatment as needed. In addition, I have reinforced the need for patient directed risk factor modification. Further evaluation will be based upon the patient's clinical course and testing results.     All questions and concerns were addressed to the

## 2018-03-30 NOTE — LETTER
 levothyroxine (SYNTHROID) 25 MCG tablet Take 25 mcg by mouth Daily.  Multiple Vitamin (MULTIVITAMIN PO) Take  by mouth daily.  aspirin 81 MG EC tablet Take 81 mg by mouth daily.  gabapentin (NEURONTIN) 300 MG capsule Take 300 mg by mouth daily.  lamotrigine (LAMICTAL) 100 MG tablet Take 100 mg by mouth 2 times daily          Objective:   PHYSICAL EXAM:    Vitals:    03/30/18 1022 03/30/18 1056   BP: 102/60 122/80   Site: Right Arm Right Arm   Position: Sitting    Cuff Size: Medium Adult Medium Adult   Pulse: 74    SpO2: 95%    Weight: 166 lb (75.3 kg)    Height: 5' 10\" (1.778 m)          VITALS:  /60 (Site: Right Arm, Position: Sitting, Cuff Size: Medium Adult)   Pulse 74   Ht 5' 10\" (1.778 m)   Wt 166 lb (75.3 kg)   SpO2 95%   BMI 23.82 kg/m²    CONSTITUTIONAL: Cooperative, no apparent distress, and appears well nourished / developed  NEUROLOGIC:  Awake and orientated to person, place and time. PSYCH: Calm affect. SKIN: Warm and dry. HEENT: Sclera non-icteric, normocephalic, neck supple, no elevation of JVP, normal carotid pulses with no bruits and thyroid normal size. LUNGS:  No increased work of breathing and clear to auscultation, no crackles or wheezing  CARDIOVASCULAR:  Regular rate 64 and rhythm with no murmurs, gallops, rubs, or abnormal heart sounds, normal PMI. The apical impulses not displaced  JVP less than 8 cm H2O  Heart tones are crisp and normal  Cervical veins are not engorged  The carotid upstroke is normal in amplitude and contour without delay or bruit  JVP is not elevated  ABDOMEN:  Normal bowel sounds, non-distended and non-tender to palpation  EXT: RLE edema (support stocking worn), no calf tenderness. Pulses are present bilaterally.     DATA:      LABS: 12/14/17:   Na+ 149 K+ 4.1 chl 117 CO2 21 BUN 23 creatinine 1.79 glu 125   Mg+ 2.8   Calcium 11.9   Urinalysis: neg     12/22/17:    trig 154 HDL 39 LDL 47      1/2/18:   WBC 14.0

## 2018-04-01 ENCOUNTER — HOSPITAL ENCOUNTER (OUTPATIENT)
Dept: PHYSICAL THERAPY | Age: 83
Discharge: HOME | End: 2018-04-27
Attending: ORTHOPAEDIC SURGERY | Admitting: ORTHOPAEDIC SURGERY

## 2018-04-09 ENCOUNTER — HOSPITAL ENCOUNTER (OUTPATIENT)
Dept: PHYSICAL THERAPY | Age: 83
Discharge: HOME | End: 2018-04-10
Admitting: ORTHOPAEDIC SURGERY

## 2018-04-09 DIAGNOSIS — R22.2 SWELLING, MASS, OR LUMP IN CHEST: ICD-10-CM

## 2018-10-05 ENCOUNTER — OFFICE VISIT (OUTPATIENT)
Dept: CARDIOLOGY CLINIC | Age: 83
End: 2018-10-05
Payer: MEDICARE

## 2018-10-05 VITALS
DIASTOLIC BLOOD PRESSURE: 68 MMHG | SYSTOLIC BLOOD PRESSURE: 120 MMHG | HEART RATE: 80 BPM | WEIGHT: 166.8 LBS | BODY MASS INDEX: 23.88 KG/M2 | HEIGHT: 70 IN

## 2018-10-05 DIAGNOSIS — E78.00 HYPERCHOLESTEREMIA: ICD-10-CM

## 2018-10-05 DIAGNOSIS — I10 ESSENTIAL HYPERTENSION: ICD-10-CM

## 2018-10-05 DIAGNOSIS — I25.10 CORONARY ARTERY DISEASE INVOLVING NATIVE CORONARY ARTERY OF NATIVE HEART WITHOUT ANGINA PECTORIS: Primary | ICD-10-CM

## 2018-10-05 PROCEDURE — 1101F PT FALLS ASSESS-DOCD LE1/YR: CPT | Performed by: INTERNAL MEDICINE

## 2018-10-05 PROCEDURE — 99214 OFFICE O/P EST MOD 30 MIN: CPT | Performed by: INTERNAL MEDICINE

## 2018-10-05 PROCEDURE — 1036F TOBACCO NON-USER: CPT | Performed by: INTERNAL MEDICINE

## 2018-10-05 PROCEDURE — G8427 DOCREV CUR MEDS BY ELIG CLIN: HCPCS | Performed by: INTERNAL MEDICINE

## 2018-10-05 PROCEDURE — G8484 FLU IMMUNIZE NO ADMIN: HCPCS | Performed by: INTERNAL MEDICINE

## 2018-10-05 PROCEDURE — 1123F ACP DISCUSS/DSCN MKR DOCD: CPT | Performed by: INTERNAL MEDICINE

## 2018-10-05 PROCEDURE — 4040F PNEUMOC VAC/ADMIN/RCVD: CPT | Performed by: INTERNAL MEDICINE

## 2018-10-05 PROCEDURE — G8598 ASA/ANTIPLAT THER USED: HCPCS | Performed by: INTERNAL MEDICINE

## 2018-10-05 PROCEDURE — G8420 CALC BMI NORM PARAMETERS: HCPCS | Performed by: INTERNAL MEDICINE

## 2018-10-05 RX ORDER — CHOLECALCIFEROL (VITAMIN D3) 25 MCG
1000 TABLET,CHEWABLE ORAL EVERY EVENING
COMMUNITY

## 2018-10-05 RX ORDER — LIDOCAINE AND PRILOCAINE 25; 25 MG/G; MG/G
CREAM TOPICAL
Refills: 5 | COMMUNITY
Start: 2018-09-20

## 2018-10-05 NOTE — PROGRESS NOTES
[address]       H+P // CONSULT // OUTPATIENT VISIT // FOLLOWUP VISIT     Referring Doctor Gabriella Merlos MD   Encounter Type Followup     CHIEF COMPLAINT     VisitType [] Acute [x] Chronic     Symptom [x] None [] CP [] SOB [] Dizzy [] Palps [] Fatigue     Problems CAD s/p CABG, HTN, CHOL      HISTORY OF PRESENT ILLNESS     Doing well. Denies cp, sob. Compliant with meds. Not very active or motivated. Symptom Y N Frequency Duration Severity Modify Assoc Sx Other   CP [] [x]         SOB [] [x]         Dizzy [] [x]         Syncope [] [x]         Palpitations [] [x]           COMPLIANCE     Category Meds Diet Salt Exercise Tobacco Alcohol Drugs   Compliant [x] [x] [x] [] [x] [x] [x]   [x]Counseling given on all above above categories    HISTORY/ALLERGIES/ROS     MedHx:  has a past medical history of CAD (coronary artery disease); Diabetes insipidus ([address] Hypercholesteremia; and Hypertension. SurgHx:  has a past surgical history that includes Coronary artery bypass graft; Retinal detachment surgery; hernia repair; knee surgery; and Appendectomy (10-). SocHx:   reports that he has never smoked. He has never used smokeless tobacco. He reports that he drinks alcohol. He reports that he does not use drugs. FamHx: family history includes Alzheimer's Disease in his father; Heart Failure in his mother; Parkinsonism in his mother. Allergies: Patient has no known allergies.    ROS:  [x]Full ROS obtained and negative except as mentioned in HPI     MEDICATIONS      Current Outpatient Prescriptions   Medication Sig Dispense Refill    lithium 150 MG capsule Take 150 mg by mouth every morning      lithium 300 MG capsule Take 150 mg by mouth nightly       metoprolol succinate (TOPROL XL) 25 MG extended release tablet Take 25 mg by mouth daily       polyethylene glycol (MIRALAX) powder Take 17 g by mouth every other day       pravastatin (PRAVACHOL) 40 MG tablet Take 0.5 tablets by mouth

## 2018-12-07 DIAGNOSIS — E78.5 HYPERLIPIDEMIA, UNSPECIFIED HYPERLIPIDEMIA TYPE: ICD-10-CM

## 2018-12-09 RX ORDER — PRAVASTATIN SODIUM 40 MG
20 TABLET ORAL DAILY
Qty: 90 TABLET | Refills: 3 | Status: SHIPPED | OUTPATIENT
Start: 2018-12-09 | End: 2020-01-23 | Stop reason: SDUPTHER

## 2019-12-11 ENCOUNTER — OFFICE VISIT (OUTPATIENT)
Dept: CARDIOLOGY CLINIC | Age: 84
End: 2019-12-11
Payer: MEDICARE

## 2019-12-11 VITALS
BODY MASS INDEX: 22.92 KG/M2 | HEIGHT: 70 IN | DIASTOLIC BLOOD PRESSURE: 80 MMHG | WEIGHT: 160.12 LBS | SYSTOLIC BLOOD PRESSURE: 120 MMHG | HEART RATE: 80 BPM

## 2019-12-11 DIAGNOSIS — I10 ESSENTIAL HYPERTENSION: ICD-10-CM

## 2019-12-11 DIAGNOSIS — I25.10 CORONARY ARTERY DISEASE INVOLVING NATIVE CORONARY ARTERY OF NATIVE HEART WITHOUT ANGINA PECTORIS: Primary | ICD-10-CM

## 2019-12-11 DIAGNOSIS — E78.2 MIXED HYPERLIPIDEMIA: ICD-10-CM

## 2019-12-11 PROCEDURE — G8598 ASA/ANTIPLAT THER USED: HCPCS | Performed by: INTERNAL MEDICINE

## 2019-12-11 PROCEDURE — 99214 OFFICE O/P EST MOD 30 MIN: CPT | Performed by: INTERNAL MEDICINE

## 2019-12-11 PROCEDURE — 1036F TOBACCO NON-USER: CPT | Performed by: INTERNAL MEDICINE

## 2019-12-11 PROCEDURE — G8427 DOCREV CUR MEDS BY ELIG CLIN: HCPCS | Performed by: INTERNAL MEDICINE

## 2019-12-11 PROCEDURE — 4040F PNEUMOC VAC/ADMIN/RCVD: CPT | Performed by: INTERNAL MEDICINE

## 2019-12-11 PROCEDURE — G8484 FLU IMMUNIZE NO ADMIN: HCPCS | Performed by: INTERNAL MEDICINE

## 2019-12-11 PROCEDURE — G8420 CALC BMI NORM PARAMETERS: HCPCS | Performed by: INTERNAL MEDICINE

## 2019-12-11 PROCEDURE — 1123F ACP DISCUSS/DSCN MKR DOCD: CPT | Performed by: INTERNAL MEDICINE

## 2019-12-28 ENCOUNTER — HOSPITAL ENCOUNTER (OUTPATIENT)
Age: 84
Discharge: HOME | End: 2019-12-28
Payer: MEDICARE

## 2019-12-28 DIAGNOSIS — E78.2 MIXED HYPERLIPIDEMIA: ICD-10-CM

## 2019-12-28 LAB
ALBUMIN SERPL-MCNC: 4.5 G/DL (ref 3.4–5)
ALP BLD-CCNC: 78 U/L (ref 40–129)
ALT SERPL-CCNC: 16 U/L (ref 10–40)
AST SERPL-CCNC: 19 U/L (ref 15–37)
BILIRUB SERPL-MCNC: 0.5 MG/DL (ref 0–1)
BILIRUBIN DIRECT: <0.2 MG/DL (ref 0–0.3)
BILIRUBIN, INDIRECT: NORMAL MG/DL (ref 0–1)
CHOLESTEROL, TOTAL: 152 MG/DL (ref 0–199)
HDLC SERPL-MCNC: 58 MG/DL (ref 40–60)
LDL CHOLESTEROL CALCULATED: 80 MG/DL
TOTAL PROTEIN: 7.4 G/DL (ref 6.4–8.2)
TRIGL SERPL-MCNC: 69 MG/DL (ref 0–150)
VLDLC SERPL CALC-MCNC: 14 MG/DL

## 2019-12-28 PROCEDURE — 80076 HEPATIC FUNCTION PANEL: CPT

## 2019-12-28 PROCEDURE — 36415 COLL VENOUS BLD VENIPUNCTURE: CPT

## 2019-12-28 PROCEDURE — 80061 LIPID PANEL: CPT

## 2020-01-23 RX ORDER — PRAVASTATIN SODIUM 40 MG
20 TABLET ORAL DAILY
Qty: 45 TABLET | Refills: 3 | Status: SHIPPED | OUTPATIENT
Start: 2020-01-23

## 2020-01-23 NOTE — TELEPHONE ENCOUNTER
RX APPROVAL:      Refill:   Requested Prescriptions     Pending Prescriptions Disp Refills    pravastatin (PRAVACHOL) 40 MG tablet 45 tablet 3     Sig: Take 0.5 tablets by mouth daily      Last OV: 12/11/2019   Last EKG:    Last Labs:  Lab Results   Component Value Date    CHOL 152 12/28/2019    TRIG 69 12/28/2019    HDL 58 12/28/2019    HDL 53 04/24/2012    LDLCALC 80 12/28/2019    LABVLDL 14 12/28/2019     Lab Results   Component Value Date    ALT 16 12/28/2019    AST 19 12/28/2019       Plan and labs reviewed  RX APPROVAL:      Refill:   Requested Prescriptions     Pending Prescriptions Disp Refills    pravastatin (PRAVACHOL) 40 MG tablet 45 tablet 3     Sig: Take 0.5 tablets by mouth daily      Last OV: 12/11/2019   Last EKG:   Last Labs: lipids 12/28/19

## 2020-11-03 ENCOUNTER — TELEPHONE (OUTPATIENT)
Dept: CARDIOLOGY CLINIC | Age: 85
End: 2020-11-03

## 2020-11-03 NOTE — LETTER
[address] Cardiology [place] [address] [address] [address]  Phone: [phone]  Fax: [phone]    Anabell Goode MD        November 3, 2020     Patient: Ghanshyam Davies   YOB: 1935   Date of Visit: 11/3/2020       To Whom It May Concern: It is my medical opinion that Geetha Nieves is stable for this procedure. There are no apparent cardiac contraindications to the proposed procedure using standard anesthetic technique. There are no apparent interventions to ameliorate the cardiac risk. This clinical assessment assumes a full Anesthesia evaluation for overall risk of airway management, type and route of anesthetic, and other relevant anesthesia-specific considerations. Per [facility], patient is low risk for procedure. The aspirin should not be stopped for procedure unless absolutely necessary. If you have any questions or concerns, please don't hesitate to call.     Sincerely,        Anabell Goode MD

## 2020-11-03 NOTE — TELEPHONE ENCOUNTER
CARDIAC CLEARANCE     What type of procedure are you having? Para thyroid ectomy     Which physician is performing your procedure? Dr Alayna Syed    When is your procedure scheduled for? 11/11/2020    Where are you having this procedure? [facility]     Are you taking Blood Thinners? If so what? (Name/dose/frequesncy)     Does the surgeon want you to stop your blood thinner? If so for how long?     Phone Number and Contact Name for Physicians office: [phone]    Fax number to send [phone]

## 2020-11-30 NOTE — PROGRESS NOTES
nightly       metoprolol succinate (TOPROL XL) 25 MG extended release tablet Take 25 mg by mouth daily       polyethylene glycol (MIRALAX) powder Take 17 g by mouth every other day       STAXYN 10 MG TBDP PLACE 1 EACH UNDER THE TONGUE DAILY AS NEEDED.  4    levothyroxine (SYNTHROID) 25 MCG tablet Take 25 mcg by mouth Daily.  Multiple Vitamin (MULTIVITAMIN PO) Take  by mouth daily.  aspirin 81 MG EC tablet Take 81 mg by mouth daily.  gabapentin (NEURONTIN) 300 MG capsule Take 300 mg by mouth daily.  lamotrigine (LAMICTAL) 100 MG tablet Take 100 mg by mouth 2 times daily        No current facility-administered medications for this visit.       Reviewed with patient and will remain unchanged except as mentioned in A/P  PHYSICAL EXAM     Vitals:    12/02/20 1027   BP: 96/62   Pulse: 95   SpO2: 97%      Gen Alert, coop, no distress Heart  Rrr, no mrg   Head NC, AT, no abnorm Abd  Soft, NT, +BS, no mass, no OM   Eyes PER, conj/corn clear Ext  Ext nl, AT, no C/C/E   Nose Nares nl, no drain, NT Pulse 2+ and symmetric   Throat Lips, mucosa, tongue nl Skin Col/text/turg nl, no vis rash/les   Neck S/S, TM, NT, no bruit/JVD Psych Nl mood and affect   Lung CTA-B, unlabored, no DTP Lymph   No cervical or axillary LA   Ch wall NT, no deform Neuro  Nl gross M/S exam     ASSESSMENT AND PLAN     *CAD   Date EF Results   Sx   No concerning   Hx 9/09  CABG   LHC 9/09  MVD->CABG   MPI 1/12  10/17   74% Normal perfusion  Normal perfusion   TTE 7/17 1/18 60%  60%    Plan   Continue aggressive medical treatment at doses above  Stop bb with low bp and depression   *HTN  Status Controlled  Plan Counseled on diet/salt/exercise/weight, continue meds at doses above  *CHOL  Status  Controlled with last LDL of 80 (goal <70) and HDL of 58 (12/19)  Plan Counseled on diet/exercise/weight, continue statin, lipid/liver surveillance per PCP  *COMPLIANCE  Status Compliant  Plan Discussed importance of compliance with meds/diet/salt/exercise; avoid tob/alc/drugs; patient verbalized understanding  *FOLLOWUP  12 months    [address] Maricel Coyne, am scribing for and in the presence of Negar Gordon MD.   Maricel Wagoner 11/30/20 1:14 PM   Provider Soren Sanchez is working as a scribe for and in the presence of me Negar Gordon MD). Working as a scribe, Maricel Hoyos may have prepopulated components of this note with my historical  intellectual property under my direct supervision. Any additions to this intellectual property were performed in my presence and at my direction. Furthermore, the content and accuracy of this note have been reviewed by me Negar Gordon MD). 12/2/2020 10:24 AM     CODING     Category Diagnosis   Stable chronic illness  (93594/61280 - 2 or more) CAD  HTN   Chronic illness with: Exac, progr or SA of Tx  (44802/75289 - 1 or more) CHOL - not at target   Undiagnosed new problem with: uncertain prognosis  (80908/74002 - 1 or more)    Acute illness with systemic Sx  (80834/72220 - 1 or more)    Acute, complicated injury  (82160/00132 - 1 or more)    63671 1 or more chronic illness with exacerbation, progression or SA of treatment    Time  30-39 minutes spent preparing to see patient including reviewing patient history/prior tests/prior consults, performing a medical exam, counseling and educating patient/family/caregiver, ordering medications/tests/procedures, referring and communicating with PCPs and other pertinent consultants, documenting information in the EMR, independently interpreting results and communicating to family and coordination of patient care.

## 2020-12-02 ENCOUNTER — OFFICE VISIT (OUTPATIENT)
Dept: CARDIOLOGY CLINIC | Age: 85
End: 2020-12-02
Payer: MEDICARE

## 2020-12-02 VITALS
WEIGHT: 153.6 LBS | BODY MASS INDEX: 21.99 KG/M2 | HEIGHT: 70 IN | SYSTOLIC BLOOD PRESSURE: 96 MMHG | HEART RATE: 95 BPM | DIASTOLIC BLOOD PRESSURE: 62 MMHG | OXYGEN SATURATION: 97 %

## 2020-12-02 PROCEDURE — 4040F PNEUMOC VAC/ADMIN/RCVD: CPT | Performed by: INTERNAL MEDICINE

## 2020-12-02 PROCEDURE — 1123F ACP DISCUSS/DSCN MKR DOCD: CPT | Performed by: INTERNAL MEDICINE

## 2020-12-02 PROCEDURE — 99214 OFFICE O/P EST MOD 30 MIN: CPT | Performed by: INTERNAL MEDICINE

## 2020-12-02 PROCEDURE — 1036F TOBACCO NON-USER: CPT | Performed by: INTERNAL MEDICINE

## 2020-12-02 PROCEDURE — G8484 FLU IMMUNIZE NO ADMIN: HCPCS | Performed by: INTERNAL MEDICINE

## 2020-12-02 PROCEDURE — G8427 DOCREV CUR MEDS BY ELIG CLIN: HCPCS | Performed by: INTERNAL MEDICINE

## 2020-12-02 PROCEDURE — G8420 CALC BMI NORM PARAMETERS: HCPCS | Performed by: INTERNAL MEDICINE

## 2020-12-02 RX ORDER — OLANZAPINE 5 MG/1
TABLET ORAL
COMMUNITY
Start: 2020-08-24

## 2020-12-02 RX ORDER — MIRTAZAPINE 30 MG/1
30 TABLET, FILM COATED ORAL NIGHTLY
COMMUNITY
Start: 2020-08-24

## 2020-12-02 NOTE — LETTER
[address] [place] [address] Cardiology [address] [address]  Phone: [phone]  Fax: [phone]    Dalton Rowe MD        December 8, 2020     [address]  [address] [facility] [address]    Patient: Alexus Graves  MR Number: [id number]  YOB: 1935  Date of Visit: 12/2/2020    Dear Dr. Esvin Duenas:      [address]     H+P // CONSULT // OUTPATIENT VISIT // [facility]     Referring Doctor Esvin Duenas   Encounter Type Followup     CHIEF COMPLAINT     Visit Type Chronic   Symptoms None   Problems CAD, HTN, CHOL     HISTORY OF PRESENT ILLNESS     ? GEN - Doing well. No new cardiac concerns. Continued problems with depression. ? CAD - Denies cp, sob, dizziness, syncope, palpitations. ? HTN - Ambulatory BP readings in good range. No HA or dizziness. ? CHOL - Last cholesterol reviewed and in good range. Tolerating statin without side effects. ? MED - Compliant with CV meds listed below without notable side effects. ? DATA - Most recent testing including but not limited to [facility], TTE, EKG reviewed personally and independently interpreted. HISTORY/ALLERGIES/ROS     MedHx:  has a past medical history of CAD (coronary artery disease), Diabetes insipidus ([address] Hypercholesteremia, and Hypertension. SurgHx:  has a past surgical history that includes Coronary artery bypass graft; Retinal detachment surgery; hernia repair; knee surgery; and Appendectomy (10-). SocHx:  reports that he has never smoked. He has never used smokeless tobacco. He reports current alcohol use. He reports that he does not use drugs. FamHx: family history includes Alzheimer's Disease in his father; Heart Failure in his mother; Parkinsonism in his mother. Allerg: Patient has no known allergies.    ROS: [x]Full ROS obtained and negative except as mentioned in HPI     MEDICATIONS      Current Outpatient Medications Medication Sig Dispense Refill    pravastatin (PRAVACHOL) 40 MG tablet Take 0.5 tablets by mouth daily 45 tablet 3    Cyanocobalamin (B-12) 1000 MCG CAPS Take 1,000 mcg by mouth every evening      lidocaine-prilocaine (EMLA) 2.5-2.5 % cream APPLY CREAM TOPICALLY IF NEEDED  5    lithium 150 MG capsule Take 150 mg by mouth every morning      lithium 300 MG capsule Take 150 mg by mouth nightly       metoprolol succinate (TOPROL XL) 25 MG extended release tablet Take 25 mg by mouth daily       polyethylene glycol (MIRALAX) powder Take 17 g by mouth every other day       STAXYN 10 MG TBDP PLACE 1 EACH UNDER THE TONGUE DAILY AS NEEDED.  4    levothyroxine (SYNTHROID) 25 MCG tablet Take 25 mcg by mouth Daily.  Multiple Vitamin (MULTIVITAMIN PO) Take  by mouth daily.  aspirin 81 MG EC tablet Take 81 mg by mouth daily.  gabapentin (NEURONTIN) 300 MG capsule Take 300 mg by mouth daily.  lamotrigine (LAMICTAL) 100 MG tablet Take 100 mg by mouth 2 times daily        No current facility-administered medications for this visit.       Reviewed with patient and will remain unchanged except as mentioned in A/P  PHYSICAL EXAM     Vitals:    12/02/20 1027   BP: 96/62   Pulse: 95   SpO2: 97%      Gen Alert, coop, no distress Heart  Rrr, no mrg   Head NC, AT, no abnorm Abd  Soft, NT, +BS, no mass, no OM   Eyes PER, conj/corn clear Ext  Ext nl, AT, no C/C/E   Nose Nares nl, no drain, NT Pulse 2+ and symmetric   Throat Lips, mucosa, tongue nl Skin Col/text/turg nl, no vis rash/les   Neck S/S, TM, NT, no bruit/JVD Psych Nl mood and affect   Lung CTA-B, unlabored, no DTP Lymph   No cervical or axillary LA   Ch wall NT, no deform Neuro  Nl gross M/S exam     ASSESSMENT AND PLAN     *CAD   Date EF Results   Sx   No concerning   Hx 9/09  CABG   LHC 9/09  MVD->CABG   MPI 1/12  10/17   74% Normal perfusion  Normal perfusion   TTE 7/17 1/18 60%  60%    Plan   Continue aggressive medical treatment at doses above Stop bb with low bp and depression   *HTN  Status Controlled  Plan Counseled on diet/salt/exercise/weight, continue meds at doses above  *CHOL  Status  Controlled with last LDL of 80 (goal <70) and HDL of 58 (12/19)  Plan Counseled on diet/exercise/weight, continue statin, lipid/liver surveillance per PCP  *COMPLIANCE  Status Compliant  Plan Discussed importance of compliance with meds/diet/salt/exercise; avoid tob/alc/drugs; patient verbalized understanding  *FOLLOWUP  12 months    [facility] [address] Lisa Coyne, am scribing for and in the presence of Jamison Richardson MD.   SignedLisa 11/30/20 1:14 PM   Provider Emiliano Gutiérrez is working as a scribe for and in the presence of me (Jamison Richardson MD). Working as a scribe, Lisa Okeefe may have prepopulated components of this note with my historical  intellectual property under my direct supervision. Any additions to this intellectual property were performed in my presence and at my direction. Furthermore, the content and accuracy of this note have been reviewed by me Jamison Richardson MD).   12/2/2020 10:24 AM     CODING     Category Diagnosis   Stable chronic illness  (88977/88238 - 2 or more) CAD  HTN   Chronic illness with: Exac, progr or SA of Tx  (91823/13837 - 1 or more) CHOL - not at target   Undiagnosed new problem with: uncertain prognosis  (75985/27008 - 1 or more)    Acute illness with systemic Sx  (82663/61519 - 1 or more)    Acute, complicated injury  (47627/98196 - 1 or more)    04774 1 or more chronic illness with exacerbation, progression or SA of treatment    Time  30-39 minutes spent preparing to see patient including reviewing patient history/prior tests/prior consults, performing a medical exam, counseling and educating patient/family/caregiver, ordering medications/tests/procedures, referring and communicating with PCPs and other pertinent consultants, documenting information in the EMR,

## 2021-01-16 ENCOUNTER — APPOINTMENT (OUTPATIENT)
Dept: GENERAL RADIOLOGY | Age: 86
End: 2021-01-16
Payer: MEDICARE

## 2021-01-16 ENCOUNTER — APPOINTMENT (OUTPATIENT)
Dept: CT IMAGING | Age: 86
End: 2021-01-16
Payer: MEDICARE

## 2021-01-16 ENCOUNTER — HOSPITAL ENCOUNTER (EMERGENCY)
Age: 86
Discharge: HOME | End: 2021-01-16
Attending: EMERGENCY MEDICINE
Payer: MEDICARE

## 2021-01-16 VITALS
HEIGHT: 70 IN | RESPIRATION RATE: 15 BRPM | DIASTOLIC BLOOD PRESSURE: 76 MMHG | BODY MASS INDEX: 22.19 KG/M2 | HEART RATE: 94 BPM | OXYGEN SATURATION: 98 % | TEMPERATURE: 98.9 F | WEIGHT: 155 LBS | SYSTOLIC BLOOD PRESSURE: 114 MMHG

## 2021-01-16 DIAGNOSIS — S01.312A LACERATION OF LEFT EARLOBE, INITIAL ENCOUNTER: ICD-10-CM

## 2021-01-16 DIAGNOSIS — Z93.59 SUPRAPUBIC CATHETER (HCC): ICD-10-CM

## 2021-01-16 DIAGNOSIS — Z23 TETANUS TOXOID VACCINATION ADMINISTERED AT CURRENT VISIT: ICD-10-CM
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
## 2021-01-16 LAB
A/G RATIO: 1.4 (ref 1.1–2.2)
ALBUMIN SERPL-MCNC: 4.2 G/DL (ref 3.4–5)
ALP BLD-CCNC: 106 U/L (ref 40–129)
ALT SERPL-CCNC: 12 U/L (ref 10–40)
ANION GAP SERPL CALCULATED.3IONS-SCNC: 10 MMOL/L (ref 3–16)
AST SERPL-CCNC: 14 U/L (ref 15–37)
BACTERIA: ABNORMAL /HPF
BASOPHILS ABSOLUTE: 0.1 K/UL (ref 0–0.2)
BASOPHILS RELATIVE PERCENT: 0.6 %
BILIRUB SERPL-MCNC: 0.3 MG/DL (ref 0–1)
BILIRUBIN URINE: NEGATIVE
BLOOD, URINE: ABNORMAL
BUN BLDV-MCNC: 25 MG/DL (ref 7–20)
CALCIUM SERPL-MCNC: 9.7 MG/DL (ref 8.3–10.6)
CHLORIDE BLD-SCNC: 102 MMOL/L (ref 99–110)
CLARITY: ABNORMAL
CO2: 25 MMOL/L (ref 21–32)
COLOR: YELLOW
CREAT SERPL-MCNC: 1.4 MG/DL (ref 0.8–1.3)
EOSINOPHILS ABSOLUTE: 0.2 K/UL (ref 0–0.6)
EOSINOPHILS RELATIVE PERCENT: 1.4 %
GFR AFRICAN AMERICAN: 59
GFR NON-AFRICAN AMERICAN: 48
GLOBULIN: 2.9 G/DL
GLUCOSE BLD-MCNC: 107 MG/DL (ref 70–99)
GLUCOSE URINE: NEGATIVE MG/DL
HCT VFR BLD CALC: 40.3 % (ref 40.5–52.5)
HEMOGLOBIN: 13.2 G/DL (ref 13.5–17.5)
KETONES, URINE: NEGATIVE MG/DL
LEUKOCYTE ESTERASE, URINE: ABNORMAL
LYMPHOCYTES ABSOLUTE: 2.8 K/UL (ref 1–5.1)
LYMPHOCYTES RELATIVE PERCENT: 21.3 %
MCH RBC QN AUTO: 30.2 PG (ref 26–34)
MCHC RBC AUTO-ENTMCNC: 32.8 G/DL (ref 31–36)
MCV RBC AUTO: 92.2 FL (ref 80–100)
MICROSCOPIC EXAMINATION: YES
MONOCYTES ABSOLUTE: 0.8 K/UL (ref 0–1.3)
MONOCYTES RELATIVE PERCENT: 6 %
NEUTROPHILS ABSOLUTE: 9.4 K/UL (ref 1.7–7.7)
NEUTROPHILS RELATIVE PERCENT: 70.7 %
NITRITE, URINE: POSITIVE
PDW BLD-RTO: 13.8 % (ref 12.4–15.4)
PH UA: 7 (ref 5–8)
PLATELET # BLD: 237 K/UL (ref 135–450)
PMV BLD AUTO: 8.4 FL (ref 5–10.5)
POTASSIUM REFLEX MAGNESIUM: 4.5 MMOL/L (ref 3.5–5.1)
PRO-BNP: 164 PG/ML (ref 0–449)
PROTEIN UA: NEGATIVE MG/DL
RBC # BLD: 4.37 M/UL (ref 4.2–5.9)
RBC UA: ABNORMAL /HPF (ref 0–4)
SODIUM BLD-SCNC: 137 MMOL/L (ref 136–145)
SPECIFIC GRAVITY UA: 1 (ref 1–1.03)
TOTAL PROTEIN: 7.1 G/DL (ref 6.4–8.2)
TROPONIN: <0.01 NG/ML
URINE REFLEX TO CULTURE: ABNORMAL
URINE TYPE: ABNORMAL
UROBILINOGEN, URINE: 0.2 E.U./DL
WBC # BLD: 13.3 K/UL (ref 4–11)
WBC UA: ABNORMAL /HPF (ref 0–5)

## 2021-01-16 PROCEDURE — 90471 IMMUNIZATION ADMIN: CPT | Performed by: PHYSICIAN ASSISTANT

## 2021-01-16 PROCEDURE — 85025 COMPLETE CBC W/AUTO DIFF WBC: CPT

## 2021-01-16 PROCEDURE — 93005 ELECTROCARDIOGRAM TRACING: CPT | Performed by: EMERGENCY MEDICINE

## 2021-01-16 PROCEDURE — 2500000003 HC RX 250 WO HCPCS

## 2021-01-16 PROCEDURE — 12013 RPR F/E/E/N/L/M 2.6-5.0 CM: CPT

## 2021-01-16 PROCEDURE — 80053 COMPREHEN METABOLIC PANEL: CPT

## 2021-01-16 PROCEDURE — 6360000002 HC RX W HCPCS: Performed by: PHYSICIAN ASSISTANT

## 2021-01-16 PROCEDURE — 90715 TDAP VACCINE 7 YRS/> IM: CPT | Performed by: PHYSICIAN ASSISTANT

## 2021-01-16 PROCEDURE — 70450 CT HEAD/BRAIN W/O DYE: CPT

## 2021-01-16 PROCEDURE — 83880 ASSAY OF NATRIURETIC PEPTIDE: CPT

## 2021-01-16 PROCEDURE — 72125 CT NECK SPINE W/O DYE: CPT

## 2021-01-16 PROCEDURE — 84484 ASSAY OF TROPONIN QUANT: CPT

## 2021-01-16 PROCEDURE — 71045 X-RAY EXAM CHEST 1 VIEW: CPT

## 2021-01-16 PROCEDURE — 81001 URINALYSIS AUTO W/SCOPE: CPT

## 2021-01-16 PROCEDURE — 99283 EMERGENCY DEPT VISIT LOW MDM: CPT

## 2021-01-16 RX ORDER — LIDOCAINE HYDROCHLORIDE 10 MG/ML
INJECTION, SOLUTION EPIDURAL; INFILTRATION; INTRACAUDAL; PERINEURAL
Status: COMPLETED
Start: 2021-01-16 | End: 2021-01-16

## 2021-01-16 RX ORDER — LEVOFLOXACIN 750 MG/1
750 TABLET ORAL
Qty: 7 TABLET | Refills: 0 | Status: SHIPPED | OUTPATIENT
Start: 2021-01-16 | End: 2021-01-29

## 2021-01-16 RX ADMIN — LIDOCAINE HYDROCHLORIDE: 10 INJECTION, SOLUTION EPIDURAL; INFILTRATION; INTRACAUDAL; PERINEURAL at 21:11

## 2021-01-16 RX ADMIN — TETANUS TOXOID, REDUCED DIPHTHERIA TOXOID AND ACELLULAR PERTUSSIS VACCINE, ADSORBED 0.5 ML: 5; 2.5; 8; 8; 2.5 SUSPENSION INTRAMUSCULAR at 23:17

## 2021-01-16 ASSESSMENT — ENCOUNTER SYMPTOMS
BACK PAIN: 0
CONSTIPATION: 0
VOMITING: 0
RESPIRATORY NEGATIVE: 1
SHORTNESS OF BREATH: 0
DIARRHEA: 0
COUGH: 0
CHEST TIGHTNESS: 0
COLOR CHANGE: 0
ABDOMINAL PAIN: 0
NAUSEA: 0
PHOTOPHOBIA: 0

## 2021-01-16 ASSESSMENT — PAIN SCALES - WONG BAKER: WONGBAKER_NUMERICALRESPONSE: 2

## 2021-01-17 LAB
EKG ATRIAL RATE: 90 BPM
EKG DIAGNOSIS: NORMAL
EKG P AXIS: 63 DEGREES
EKG P-R INTERVAL: 192 MS
EKG Q-T INTERVAL: 372 MS
EKG QRS DURATION: 92 MS
EKG QTC CALCULATION (BAZETT): 455 MS
EKG R AXIS: -13 DEGREES
EKG T AXIS: 63 DEGREES
EKG VENTRICULAR RATE: 90 BPM

## 2021-01-17 NOTE — ED PROVIDER NOTES
I independently performed a history and physical on Santos Lerner. All diagnostic, treatment, and disposition decisions were made by myself in conjunction with the advanced practice provider. For further details of [facility]igan emergency department encounter, please see Rodman Boxer, PA's documentation. Patient reports he was at home and he fell. He is not exactly sure what caused him to fall. He does have peripheral neuropathy but he also does not exactly remember the fall or the sensation of falling. He injured the left side of his head and sustained a laceration of his left ear. He denies any other pain complaints or injuries. He also has a suprapubic catheter in place and the bag was changed yesterday. He states he changes the bag once a month. He denies any fevers, chills or sweats. On exam he has a complex left ear laceration that is through the cartilage. Heart regular rate and rhythm and lungs clear to auscultation bilaterally. No tenderness along spine. Abdomen nontender. SP cath site without any signs of infection. I advised patient that given his urinary tract infection and serum leukocytosis I feel he would benefit from hospitalization. EKG  The Ekg interpreted by me shows  normal sinus rhythm with a rate of 90  Axis is   Normal  QTc is  normal  iRBBB  ST Segments: no acute change  No significant change from prior EKG dated 27 mar 2014    Ct Head Wo Contrast  No acute intracranial abnormality. Ct Cervical Spine Wo Contrast  No acute abnormality of the cervical spine. Xr Chest Portable  CABG. COPD. No cardiomegaly, pneumonia or interstitial edema. No significant change has occurred from 03/27/2014.    Results for orders placed or performed during the hospital encounter of 01/16/21   CBC Auto Differential   Result Value Ref Range    WBC 13.3 (H) 4.0 - 11.0 K/uL    RBC 4.37 4.20 - 5.90 M/uL    Hemoglobin 13.2 (L) 13.5 - 17.5 g/dL Hematocrit 40.3 (L) 40.5 - 52.5 %    MCV 92.2 80.0 - 100.0 fL    MCH 30.2 26.0 - 34.0 pg    MCHC 32.8 31.0 - 36.0 g/dL    RDW 13.8 12.4 - 15.4 %    Platelets 658 665 - 216 K/uL    MPV 8.4 5.0 - 10.5 fL    Neutrophils % 70.7 %    Lymphocytes % 21.3 %    Monocytes % 6.0 %    Eosinophils % 1.4 %    Basophils % 0.6 %    Neutrophils Absolute 9.4 (H) 1.7 - 7.7 K/uL    Lymphocytes Absolute 2.8 1.0 - 5.1 K/uL    Monocytes Absolute 0.8 0.0 - 1.3 K/uL    Eosinophils Absolute 0.2 0.0 - 0.6 K/uL    Basophils Absolute 0.1 0.0 - 0.2 K/uL   Urinalysis Reflex to Culture    Specimen: Urine, clean catch   Result Value Ref Range    Color, UA YELLOW Straw/Yellow    Clarity, UA CLOUDY (A) Clear    Glucose, Ur Negative Negative mg/dL    Bilirubin Urine Negative Negative    Ketones, Urine Negative Negative mg/dL    Specific Gravity, UA 1.005 1.005 - 1.030    Blood, Urine MODERATE (A) Negative    pH, UA 7.0 5.0 - 8.0    Protein, UA Negative Negative mg/dL    Urobilinogen, Urine 0.2 <2.0 E.U./dL    Nitrite, Urine POSITIVE (A) Negative    Leukocyte Esterase, Urine LARGE (A) Negative    Microscopic Examination YES     Urine Type NotGiven     Urine Reflex to Culture Not Indicated    Troponin   Result Value Ref Range    Troponin <0.01 <0.01 ng/mL   Comprehensive Metabolic Panel w/ Reflex to MG   Result Value Ref Range    Sodium 137 136 - 145 mmol/L    Potassium reflex Magnesium 4.5 3.5 - 5.1 mmol/L    Chloride 102 99 - 110 mmol/L    CO2 25 21 - 32 mmol/L    Anion Gap 10 3 - 16    Glucose 107 (H) 70 - 99 mg/dL    BUN 25 (H) 7 - 20 mg/dL    CREATININE 1.4 (H) 0.8 - 1.3 mg/dL    GFR Non- 48 (A) >60    GFR  59 (A) >60    Calcium 9.7 8.3 - 10.6 mg/dL    Total Protein 7.1 6.4 - 8.2 g/dL    Alb 4.2 3.4 - 5.0 g/dL    Albumin/Globulin Ratio 1.4 1.1 - 2.2    Total Bilirubin 0.3 0.0 - 1.0 mg/dL    Alkaline Phosphatase 106 40 - 129 U/L    ALT 12 10 - 40 U/L    AST 14 (L) 15 - 37 U/L    Globulin 2.9 g/dL Brain Natriuretic Peptide   Result Value Ref Range    Pro- 0 - 449 pg/mL   Microscopic Urinalysis   Result Value Ref Range    WBC, UA 6-9 0 - 5 /HPF    RBC, UA 0-2 0 - 4 /HPF    Bacteria, UA 3+ (A) None Seen /HPF   EKG 12 Lead   Result Value Ref Range    Ventricular Rate 90 BPM    Atrial Rate 90 BPM    P-R Interval 192 ms    QRS Duration 92 ms    Q-T Interval 372 ms    QTc Calculation (Bazett) 455 ms    P Axis 63 degrees    R Axis -13 degrees    T Axis 63 degrees    Diagnosis       Normal sinus rhythmIncomplete right bundle branch blockSeptal infarct , age undetermined          Mattie Ceja MD  01/16/21 3650

## 2021-01-17 NOTE — ED NOTES
Patient discharged at this time in no acute distress after verbalizing understanding of discharge instructions and need for follow up with PCP . Pt left via wheel chair to discharge area after reviewing and receiving copy of ov AVS.   Patient education  Learner- Patient   Motivation and readiness to learn- Medium to High  Barriers to learning- None  Learning preference/provided instructions- Both written and verbal discharge instructions    Assisted pt to car. Wife Edi picked up.                    Pattie Anne RN  01/16/21 5103

## 2021-01-17 NOTE — ED PROVIDER NOTES
[address] [facility]        Pt Name: Nikolay England  MRN: [id number]  [place] 1935  Date of evaluation: 1/16/2021  Provider: NICOLE Payne  PCP: Nanci Cheung     I have seen and evaluated this patient with my supervising physician [facility]       Chief Complaint   Patient presents with   Bakari Johnson     thinks he tripped and fell but unsure. large lac to L ear. unsure of LOC. lives at home with wife. takes baby asa. HISTORY OF PRESENT ILLNESS   (Location, Timing/Onset, Context/Setting, Quality, Duration, Modifying Factors, Severity, Associated Signs and Symptoms)  Note limiting factors. Nikolay England is a 82 y.o. male with past medical strip CAD, diabetes insipidus, hyperlipidemia and hypertension who presents to the ED with complaint of a fall. Patient states he had a fall just prior to arrival.  States laceration to his left ear. Patient states he thinks he tripped but is unsure. States he does not believe he lost consciousness. Does take baby aspirin at home but denies any other blood thinners. Unsure of last tetanus vaccine. Denies headache, visual changes, speech disturbances, numbness/tingling, lightheadedness/dizziness, neck pain, chest pain, shortness of breath, abdominal pain, nausea/vomiting, urinary symptoms or changes in bowel movements. Patient came to the ED for further evaluation and treatment for left ear laceration. Nursing Notes were all reviewed and agreed with or any disagreements were addressed in the HPI. REVIEW OF SYSTEMS    (2-9 systems for level 4, 10 or more for level 5)     Review of Systems   Constitutional: Negative for activity change, appetite change, chills, diaphoresis, fatigue and fever. Eyes: Negative for photophobia and visual disturbance. Respiratory: Negative. Negative for cough, chest tightness and shortness of breath. Cardiovascular: Negative. Negative for chest pain, palpitations and leg swelling. Gastrointestinal: Negative for abdominal pain, constipation, diarrhea, nausea and vomiting. Genitourinary: Negative for decreased urine volume, difficulty urinating, dysuria, flank pain, frequency, hematuria and urgency. Musculoskeletal: Negative for arthralgias, back pain, myalgias, neck pain and neck stiffness. Skin: Positive for wound. Negative for color change, pallor and rash. Neurological: Negative for dizziness, tremors, seizures, syncope, facial asymmetry, speech difficulty, weakness, light-headedness, numbness and headaches. Positives and Pertinent negatives as per HPI. Except as noted above in the ROS, all other systems were reviewed and negative. PAST MEDICAL HISTORY     Past Medical History:   Diagnosis Date    CAD (coronary artery disease) 2009    CABG    Diabetes insipidus ([facility] [address]     Hypercholesteremia     Hypertension          SURGICAL HISTORY     Past Surgical History:   Procedure Laterality Date    APPENDECTOMY  10-    CORONARY ARTERY BYPASS GRAFT      2009    HERNIA REPAIR      GROIN BOTH SIDES    KNEE SURGERY      RIGHT    RETINAL DETACHMENT SURGERY      BOTH    THYROID SURGERY      parathyroid tumor         [address]       Discharge Medication List as of 1/16/2021 11:05 PM      CONTINUE these medications which have NOT CHANGED    Details   DULoxetine HCl 60 MG CSDR Take 60 mg by mouth dailyHistorical Med      OLANZapine (ZYPREXA) 5 MG tablet Cut one 5 mg tablet in half. Take a half-tablet (2.5 mg) twice a day, once in the morning and once in the evening.  Indications: Bipolar Disorder, MOODHistorical Med      mirtazapine (REMERON) 30 MG tablet Take 30 mg by mouth nightlyHistorical Med      pravastatin (PRAVACHOL) 40 MG tablet Take 0.5 tablets by mouth daily, Disp-45 tablet,R-3Normal Cyanocobalamin (B-12) 1000 MCG CAPS Take 1,000 mcg by mouth every eveningHistorical Med      lidocaine-prilocaine (EMLA) 2.5-2.5 % cream APPLY CREAM TOPICALLY IF NEEDED, R-5, Historical Med      polyethylene glycol (MIRALAX) powder Take 17 g by mouth every other day Historical Med      levothyroxine (SYNTHROID) 25 MCG tablet Take 25 mcg by mouth Daily. Multiple Vitamin (MULTIVITAMIN PO) Take  by mouth daily. aspirin 81 MG EC tablet Take 81 mg by mouth daily. ALLERGIES     Patient has no known allergies. FAMILYHISTORY       Family History   Problem Relation Age of Onset    Heart Failure Mother     Parkinsonism Mother     Alzheimer's Disease Father     Heart Disease Neg Hx           SOCIAL HISTORY       Social History     Tobacco Use    Smoking status: Never Smoker    Smokeless tobacco: Never Used   Substance Use Topics    Alcohol use: Yes     Comment: occassional    Drug use: No       SCREENINGS             PHYSICAL EXAM    (up to 7 for level 4, 8 or more for level 5)     ED Triage Vitals [01/16/21 1916]   BP Temp Temp src Pulse Resp SpO2 Height Weight   114/76 98.9 °F (37.2 °C) -- 94 15 98 % 5' 10\" (1.778 m) 155 lb (70.3 kg)       Physical Exam  Constitutional:       General: He is not in acute distress. Appearance: Normal appearance. He is well-developed. He is not ill-appearing, toxic-appearing or diaphoretic. HENT:      Head: Normocephalic and atraumatic. Right Ear: Tympanic membrane, ear canal and external ear normal. There is no impacted cerumen. Left Ear: Tympanic membrane and ear canal normal. There is no impacted cerumen.       Ears: Comments: Large laceration that goes through and through the left ear. Appears to go through the helix, antihelix into the scaphoid fossa. Not extend into the external auditory meatus. Does not appear to extend into the internal ear. There is no auricular hematoma noted. Dried blood noted but no bleeding at this time. No raccoon eyes or ferrari sign. No epistaxis. No septal hematoma. No trismus or jaw malocclusion. Nose: Nose normal. No congestion or rhinorrhea. Mouth/Throat:      Mouth: Mucous membranes are moist.      Pharynx: No oropharyngeal exudate or posterior oropharyngeal erythema. Eyes:      General:         Right eye: No discharge. Left eye: No discharge. Extraocular Movements: Extraocular movements intact. Conjunctiva/sclera: Conjunctivae normal.      Pupils: Pupils are equal, round, and reactive to light. Neck:      Musculoskeletal: Normal range of motion and neck supple. No neck rigidity or muscular tenderness. Cardiovascular:      Rate and Rhythm: Normal rate and regular rhythm. Pulses: Normal pulses. Heart sounds: Normal heart sounds. No murmur. No friction rub. No gallop. Comments: 2+ radial pulses bilaterally. No pedal edema. No calf tenderness. No JVD. Pulmonary:      Effort: Pulmonary effort is normal. No respiratory distress. Breath sounds: Normal breath sounds. No stridor. No wheezing, rhonchi or rales. Chest:      Chest wall: No tenderness. Abdominal:      General: Abdomen is flat. There is no distension. Palpations: Abdomen is soft. There is no mass. Tenderness: There is no abdominal tenderness. There is no right CVA tenderness, left CVA tenderness, guarding or rebound. Hernia: No hernia is present. Musculoskeletal: Normal range of motion. Comments: No TTP to the midline cervical, thoracic or lumbar spine. No anterior chest wall tenderness. No pelvis instability. No TTP to the upper and lower extremities throughout. Full range of motion/strength in the upper and lower extremities throughout. Distal neurovascular intact. Lymphadenopathy:      Cervical: No cervical adenopathy. Skin:     General: Skin is warm and dry. Coloration: Skin is not pale. Findings: No erythema. Neurological:      General: No focal deficit present. Mental Status: He is alert. GCS: GCS eye subscore is 4. GCS verbal subscore is 5. GCS motor subscore is 6. Cranial Nerves: Cranial nerves are intact. No cranial nerve deficit, dysarthria or facial asymmetry. Sensory: Sensation is intact. No sensory deficit. Motor: Motor function is intact. No weakness or tremor. Coordination: Coordination is intact. Coordination normal.      Gait: Gait is intact. Gait normal.      Comments: Patient able to ambulate with standby assist from wheelchair to bed on my initial evaluation.    Psychiatric:         Behavior: Behavior normal.         DIAGNOSTIC RESULTS   LABS:    Labs Reviewed   CBC WITH AUTO DIFFERENTIAL - Abnormal; Notable for the following components:       Result Value    WBC 13.3 (*)     Hemoglobin 13.2 (*)     Hematocrit 40.3 (*)     Neutrophils Absolute 9.4 (*)     All other components within normal limits    Narrative:     Performed at:  [facility] [address], [place], [facility] [place]   Phone [phone]   URINE RT REFLEX TO CULTURE - Abnormal; Notable for the following components:    Clarity, UA CLOUDY (*)     Blood, Urine MODERATE (*)     Nitrite, Urine POSITIVE (*)     Leukocyte Esterase, Urine LARGE (*)     All other components within normal limits    Narrative:     Performed at:  [facility]  [address] [place]   Phone [phone] COMPREHENSIVE METABOLIC PANEL W/ REFLEX TO MG FOR LOW K - Abnormal; Notable for the following components:    Glucose 107 (*)     BUN 25 (*)     CREATININE 1.4 (*)     GFR Non- 48 (*)     GFR  59 (*)     AST 14 (*)     All other components within normal limits    Narrative:     Performed at:  [facility]  [address], [address]   Phone [phone]   MICROSCOPIC URINALYSIS - Abnormal; Notable for the following components:    Bacteria, UA 3+ (*)     All other components within normal limits    Narrative:     Performed at:  [facility] [address]   Phone [phone]   TROPONIN    Narrative:     Performed at:  [facility] [address]   Phone [phone]     Narrative:     Performed at:  [facility] [address] [place]   Phone [phone]       All other labs were within normal range or not returned as of this dictation. EKG: All EKG's are interpreted by the Emergency Department Physician in the absence of a cardiologist.  Please see their note for interpretation of EKG. RADIOLOGY:   Non-plain film images such as CT, Ultrasound and MRI are read by the radiologist. Plain radiographic images are visualized and preliminarily interpreted by the ED Provider with the below findings:        Interpretation per the Radiologist below, if available at the time of this note:    CT CERVICAL SPINE WO CONTRAST   Final Result   No acute abnormality of the cervical spine. CT HEAD WO CONTRAST   Final Result   No acute intracranial abnormality. XR CHEST PORTABLE   Final Result   CABG. COPD. No cardiomegaly, pneumonia or interstitial edema. No significant change has occurred from 03/27/2014. Repair method:  Sutures    Suture size:  5-0    Suture material:  Nylon    Suture technique:  Simple interrupted and running    Number of sutures:  11  Approximation:     Approximation:  Close  Post-procedure details:     Dressing:  Bulky dressing    Patient tolerance of procedure:   Tolerated well, no immediate complications        CRITICAL CARE TIME   N/A    CONSULTS:  None      EMERGENCY DEPARTMENT COURSE and DIFFERENTIAL DIAGNOSIS/MDM:   Vitals:    Vitals:    01/16/21 1916   BP: 114/76   Pulse: 94   Resp: 15   Temp: 98.9 °F (37.2 °C)   SpO2: 98%   Weight: 155 lb (70.3 kg)   Height: 5' 10\" (1.778 m)       Patient was given the following medications:  Medications   lidocaine PF 1 % injection (  Given 1/16/21 2111)   Tetanus-Diphth-Acell Pertussis (BOOSTRIX) injection 0.5 mL (0.5 mLs Intramuscular Given 1/16/21 2631) Patient is an 82-year-old male who presents to the ED with complaint of a fall. Had questionable mechanical fall versus syncope. Patient is a poor historian and cannot tell me exactly what happened. Does have some neuropathy and states he falls often. Patient has fairly significant laceration to the left ear. Through and through laceration that does involve the cartilage. Tetanus was updated given unknown status. IV established and blood work obtained. CBC showed white count of 13.3 with hemoglobin of 13.2. Platelets 500. Urinalysis showed positive nitrite with large leukocytes. Does have indwelling suprapubic catheter. Microscopic showed 3+ bacteria with only 6-9 white blood cells. May potentially be chronic secondary to indwelling suprapubic catheter but apparently was changed yesterday. Cannot rule out underlying infection especially given leukocytosis. CMP showed creatinine of 1.4 which appears similar to previous. . Troponin normal.  EKG interpreted by attending. Chest x-ray shows status post CABG with COPD. No cardiomegaly, pneumonia or interstitial edema. CT of the head unremarkable. CT of the cervical spine showed no acute abdomen. Auricular block to the left ear and ear laceration was repaired by myself. Patient tolerated the procedure well. Did discuss with wife and patient initially was planning on admission given leukocytosis, questionable UTI with fall that may be mechanical versus potential syncopal episode. After laceration was repaired wife called back to the emergency department and did not want patient to stay given Covid pandemic. Felt that if his work-up was reassuring she felt comfortable with him going home because she is an exnurse.   States she would keep an eye on him but did not want to admit him due to Covid pandemic and concern for potentially acquiring Covid in the hospital.  Did have lengthy discussion with wife that given the unknown nature of his fall Trudy [place]  01/17/21 0028

## 2021-08-03 ENCOUNTER — HOSPITAL ENCOUNTER (OUTPATIENT)
Dept: PHYSICAL THERAPY | Age: 86
Setting detail: THERAPIES SERIES
Discharge: HOME | End: 2021-08-03
Payer: MEDICARE

## 2021-08-03 PROCEDURE — 97112 NEUROMUSCULAR REEDUCATION: CPT

## 2021-08-03 PROCEDURE — 97161 PT EVAL LOW COMPLEX 20 MIN: CPT

## 2021-08-03 NOTE — PLAN OF CARE
[place], [facility], [address]  Phone: [phone]   Fax: [phone]     Physical Therapy Certification    Dear Referring Practitioner: Elo Zaldivar MD,    We had the pleasure of evaluating the following patient for physical therapy services at [address] [place] [address]. A summary of our findings can be found in the initial assessment below. This includes our plan of care. If you have any questions or concerns regarding these findings, please do not hesitate to contact me at the office phone number checked above. Thank you for the referral.       Physician Signature:_______________________________Date:__________________  By signing above (or electronic signature), therapists plan is approved by physician      Patient: Carolina Gandara   : 1935   MRN: [id number]  Referring Physician: Referring Practitioner: Elo Zaldivar MD      Evaluation Date: 8/3/2021      Medical Diagnosis Information:  Diagnosis: Ataxic gait   Treatment Diagnosis: Impaired static and dynamic balance, proximal hip weakness, decreased standing tolerance, impaired gait                                         Insurance information: PT Insurance Information: Medicare and UMR     Precautions/ Contra-indications: Wears catheter  Latex Allergy:  [x]NO      []YES  Preferred Language for Healthcare:   [x]English       []other:    Relevant Medical History:   See below     C-SSRS Triggered by Intake questionnaire (Past 2 wk assessment ):   [x] No, Questionnaire did not trigger screening.   [] Yes, Patient intake triggered C-SSRS Screening     [] Completed, no further action required. [] Completed, PCP notified via Epic    SUBJECTIVE:  Pt reports worsening balance over the last 2 years. He blames it partially on his L knee replacement. Neuropathy was diagnosed in  but until his TKA it did not bother him.  His primary exercise until recently involved bike riding. Current standing tolerance <5 minutes due to poor balance. Pt reports he does not have pain, even with his neuropathy. Pt drinks ~3 L of water per day. Pt's house has 2 railings with stairs to the basment. Pt reports because he has 2 HRs he has no issues using the stairs to the basement. Does have difficulty with curb steps. Pain Scale: 0/10  Easing factors: N/A  Provocative factors: N/A  Type: []Constant   []Intermittent  []Radiating []Localized [x]other: No complaints of pain impacting mobility or daily life     Numbness/Tingling: Present in B/L feet due to neuropathy    Occupation/School/Hobbies: Retired -      Living Status/Prior Level of Function: Prior to this injury / incident, pt was independent with ADLs and IADLs. Has 7 grand kids. Hobbies include making soups.      Domicile:  [x]Single level house  []Multi-level house []Trailer/Mobile home        []Condo/Town home  []Apartment   []Other:          [x]Steps to enter home: 1-2   w/ [x]HR (handle on door frame) []No HR; []Level []Ramped entry/exit ;[]Elevator access    Assistance in home:   [x]None  []Family  []Family/Friend/Neighbor outside of home    Available DME:  [x]SPC     []QC []RW     []4WW []Emanuel-walker       []Manual w/c []Powered w/c []Transport chair     [x]Shower bench []Transfer-tub bench    []Grab bars       []BSC  []RTS []Versa-frame              []Lift-chair  []Bed rail   []Hospital Bed  []Alert Button  []Other:       OBJECTIVE:     Functional Mobility:     Bed mobility: Not assessed   Transfers: Requires UEs to complete transfers    Gait: Slow gait speed, decreased trunk rotation    Balance: see below     MMT LEFT RIGHT Comments         Hip flexion (L1-L2)  3+ seated L not tested - wearing catheter bag   Hip abduction      Hip extension      Knee extension (L2-L4) 5 5    Knee flexion 5 5    Ankle Dorsiflexion (L4-L5) 4+ 4+    Ankle Plantar flexion (S1-S2)      Ankle Eversion (S1-S2)      Great Toe Ext (L5) Core Strength           Balance/Special Tests/Outcome Measures: Result N/A Comments   SLS Unable      Feet Together      Romberg         Tandem Stance      QOLIBRI      TUG Score 16.6 sec  No AD   10-meter Walk 11 sec     Modified Stanislav      30 sec STS 10      Dynamic Gait Index 13     Tinetti Assessment       MADRID Balance Assessment Madrid Balance Score: 29        [x] Patient history, allergies, meds reviewed. Medical chart reviewed. See intake form. Review Of Systems (ROS):  [x]Performed Review of systems (Integumentary, CardioPulmonary, Neurological) by intake and observation. Intake form has been scanned into medical record. Patient has been instructed to contact their primary care physician regarding ROS issues if not already being addressed at this time.       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   Musculoskeletal conditions   []Disc pathology   []Congenital spine pathologies   []Osteoporosis (M81.8)  []Osteopenia (M85.8)  []Scoliosis       Endocrine conditions   []Hypothyroid (E03.9)  []Hyperthyroid Gastrointestinal conditions   []Constipation (R38.99)   Metabolic conditions   []Morbid obesity (E66.01)  []Diabetes type 1(E10.65) or 2 (E11.65)   [x]Neuropathy (G60.9)     Cardio/Pulmonary conditions   []Asthma (J45)  []Coughing   []COPD (J44.9)  []CHF  []A-fib   Psychological Disorders  []Anxiety (F41.9)  []Depression (F32.9)   []Other:   Developmental Disorders  []Autism (F84.0)  []CP (G80)  []Down Syndrome (Q90.9)  []Developmental delay     Neurological conditions  []Prior Stroke (I69.30)  []Parkinson's (G20)  []Encephalopathy (G93.40)  []MS (G35)  []Post-polio (G14)  []SCI  []TBI  []ALS Other conditions  []Fibromyalgia (M79.7)  []Vertigo  []Syncope  []Kidney Failure  []Cancer      []currently undergoing                treatment  []Pregnancy  []Incontinence   Prior surgeries  []involved limb  []previous spinal surgery  [] section birth  []hysterectomy  []bowel / bladder surgery  []other relevant surgeries   [x]Other:  Hernia repair, thyroid surgery, Chronic Kidney Disease Stage 3               Barriers to/and or personal factors that will affect rehab potential:              [x]Age  []Sex    []Smoker              [x]Motivation/Lack of Motivation                        [x]Co-Morbidities              []Cognitive Function, education/learning barriers              []Environmental, home barriers              []profession/work barriers  []past PT/medical experience  []other:    Falls Risk Assessment (30 days): Last fall 2-3 months ago  [] Falls Risk assessed and no intervention required. [x] Falls Risk assessed and Patient requires intervention due to being higher risk   TUG score (>12s at risk):     [x] Falls education provided, including use of HRs for stairs        ASSESSMENT: Natacha Giles is a 82 y.o. male presenting to physical therapy with complaints of impaired balance. Pt demonstrates impaired static and dynamic balance, proximal hip weakness, decreased standing tolerance, impaired gait. Pt is also greatly limited in his static standing tolerance which impacts pt's ability to complete ADLs. These deficits place pt at a risk for falls. Pt would benefit from skilled PT to improve balance and proximal hip strength for decreased fall risk.         Functional Impairments:     []Noted scapular/hip hypomobility   []Noted scapular/hip hypermobility   []Assistance required with functional mobility/gait for safety   []Decreased core/proximal hip strength and neuromuscular control    [x]Decreased UE/LE functional strength    []Abnormal reflexes/sensation/myotomal/dermatomal deficits  []Hypertonic UE/LE   []Hypotonic UE/LE  []Dislocated//Hypermobile Glenohumeral joint  [x]Impaired balance/coordination/proprioceptive control    []other:      Functional Activity Limitations (from functional questionnaire and intake)   [x]Reduced ability to tolerate prolonged functional positions   []Reduced ability or difficulty with changes of positions or transfers between positions   []Reduced ability to maintain good posture and demonstrate good body mechanics with sitting, bending, and lifting   []Reduced ability to sleep   [] Reduced ability or tolerance with driving and/or computer work   [x]Reduced ability to perform lifting, reaching, carrying tasks   []Reduced ability to squat   []Reduced ability to forward bend   [x]Reduced ability to ambulate prolonged functional periods/distances/surfaces   []Reduced ability to ascend/descend stairs   []other:       Participation Restrictions   []Reduced participation in self care activities   []Reduced participation in home management activities   []Reduced participation in work activities   []Reduced participation in social activities. [x]Reduced participation in sport/recreational activities.     Classification:   [x]Signs/symptoms consistent with Primary prevention/risk reduction for loss of balance and falls    []Signs/symptoms consistent with Impaired neuromotor development   []Signs/symptoms consistent with Impaired motor function and sensory integrity associated w/non-progressive disorders of CNS - Congenital/Aquired in Infancy/Childhood or Acquired in Adolescence/Adulthood   []Signs/symptoms consistent with Impaired motor function and sensory integrity associate w/progressive disorders of the CNS     []Signs/symptoms consistent with Impaired motor function and sensory integrity associated w/acute or chronic polyneuropathies   []Signs/symptoms consistent with Impaired motor function, peripheral nerve integrity, and sendory integrity associated w/non-progressive disorders of spinal cord   []other:      Prognosis/Rehab Potential:   Tolerance of evaluation/treatment:    []Excellent     []Excellent   [x]Good     [x]Good   []Fair      []Fair   []Poor      []Poor      Physical Therapy Evaluation Complexity Justification  [x] A history of present problem with:  [] no personal factors and/or comorbidities that impact the plan of care;  [x]1-2 personal factors and/or comorbidities that impact the plan of care  []3 personal factors and/or comorbidities that impact the plan of care  [x] An examination of body systems using standardized tests and measures addressing any of the following: body structures and functions (impairments), activity limitations, and/or participation restrictions;:  [] a total of 1-2 or more elements   [x] a total of 3 or more elements   [] a total of 4 or more elements   [x] A clinical presentation with:  [x] stable and/or uncomplicated characteristics   [] evolving clinical presentation with changing characteristics  [] unstable and unpredictable characteristics;   [x] Clinical decision making of [x] low, [] moderate, [] high complexity using standardized patient assessment instrument and/or measurable assessment of functional outcome. [x] EVAL (LOW) 83560 (typically 15 minutes face-to-face)  [] EVAL (MOD) 57951 (typically 30 minutes face-to-face)  [] EVAL (HIGH) 02715 (typically 45 minutes face-to-face)  [] RE-EVAL     PLAN: PT to begin focusing on:  Activation of scapular and hip musculature, Transfer & Gait safety, Evenly distributed weight-bearing through extremities, Pain Management    Frequency/Duration:  2 days per week for 4 Weeks:  Interventions:  [x]  Therapeutic exercise including: strength training, ROM, for LE, Glutes and core   [x]  NMR activation and proprioception for shoulder complex, glutes, UE/LE, and Core   [x]  Manual therapy as indicated for Shoulder & Hip complex, LE and core activation to include: STM, manual cues to facilitate/inhibit muscle groups. [x]  Modalities as needed that may include: thermal agents, E-stim, Biofeedback, US as indicated  [x]  Patient education on joint protection, postural re-education, home/activity modification, progression of HEP. HEP instruction: Written HEP instructions provided and reviewed. GOALS:  Patient stated goal: improve balance  [] Progressing: [] Met: [] Not Met: [] Adjusted    Therapist goals for Patient:   Short Term Goals: To be achieved in: 2 weeks  1. Independent in HEP and progression per patient tolerance, in order to prevent re-injury. [] Progressing: [] Met: [] Not Met: [] Adjusted  2. Patient will have a decrease in pain to facilitate improvement in movement, function, and ADLs as indicated by Functional Deficits. [] Progressing: [] Met: [] Not Met: [] Adjusted    Long Term Goals: To be achieved in: 4 weeks  1. Patient to demonstrate TUG score <13 seconds to demonstrate improved fall risk to assist with reaching prior level of function. [] Progressing: [] Met: [] Not Met: [] Adjusted  2. Patient will demonstrate increased AROM to [facility], good scapular mobility and good hip ROM to allow for proper joint functioning as indicated by patients Functional Deficits. [] Progressing: [] Met: [] Not Met: [] Adjusted  3. Patient will demonstrate an increase in strength to good shoulder & hip complex, and core activation to allow for proper functional mobility as indicated by patients Functional Deficits. [] Progressing: [] Met: [] Not Met: [] Adjusted  4. Patient will return to functional activities including standing >15 minutes without need for assistance. [] Progressing: [] Met: [] Not Met: [] Adjusted  5. Patient to improve MADRID score to >38 to improve static balance and decrease risk for falls.      [] Progressing: [] Met: [] Not Met: [] Adjusted     Electronically signed by:  Korina Quinteros PT DPT

## 2021-08-03 NOTE — FLOWSHEET NOTE
[address] [facility]  Phone: [phone]   Fax: [phone]    Physical Therapy Daily Treatment Note  Date:  8/3/2021    Patient Name:  Rosalind Bennett    :  1935  MRN: [id number]  Medical/Treatment Diagnosis Information:  · Diagnosis: Ataxic gait  · Treatment Diagnosis: Impaired static and dynamic balance, proximal hip weakness, decreased standing tolerance, impaired gait  Insurance/Certification information:  PT Insurance Information: Medicare and R  Physician Information:  Referring Practitioner: Barbara Jerez MD  Plan of care signed (Y/N): []  Yes []  No     Date of Patient follow up with Physician:      Progress Report: []  Yes  []  No     Date Range for reporting period:  Beginning  8/3/2021   Ending      Progress report due (10 Rx/or 30 days whichever is less): visit #10 or 4/3/52     Recertification due (POC duration/ or 90 days whichever is less): visit #10 or 11/3/21     Visit # Insurance Allowable Auth required?  Date Range    Medical necessity []  Yes  [x]  No n/a     Latex Allergy:  [x]NO      []YES  Preferred Language for Healthcare:   [x]English       []Other:      Functional Scale/Test Evaluation 8/3/2021 30 day  60 day  Discharge   DGI 13       TUG 16.6      MADRID 29            Pain level:  0/10  Location:  n/a    SUBJECTIVE:  See eval    OBJECTIVE: See eval      RESTRICTIONS/PRECAUTIONS: Wears catheter    Interventions/Exercises:   Therapeutic Exercises (13229) Resistance / level Sets/sec Reps Notes                               Neuromuscular Re-ed (59643)        Balance testing  X 25 min                                                       Therapeutic Activities (07472) /  Functional Tasks / Gait Training (75694)                                                                      Manual Intervention (37841)                                                     Modalities:     Pt. Education:  8/3/2021 patient educated on diagnosis, prognosis and expectations for rehab, all patient questions were answered    HEP instruction:      Therapeutic Exercise and NMR EXR  [x] (25978) Provided verbal/tactile cueing for activities related to strengthening, flexibility, endurance, ROM for improvements in  [x] LE / Core stability: LE, hip, and core control with self care, mobility, lifting, ambulation. [] UE / Shoulder complex: cervical, postural, scapular, scapulothoracic and UE control with self care, reaching, carrying, lifting, house/yardwork, driving, computer work.  [] (64775) Provided verbal/tactile cueing for activities related to improving balance, coordination, kinesthetic sense, posture, motor skill, proprioception to assist with   [] LE / Core stability: LE, hip, and core control in self care, mobility, lifting, ambulation and eccentric single leg control. [] UE / Shoulder complex: cervical, scapular, scapulothoracic and UE control with self care, reaching, carrying, lifting, house/yardwork, driving, computer work.   [] (57527) Therapist is in constant attendance of 2 or more patients providing skilled therapy interventions, but not providing any significant amount of measurable one-on-one time to either patient, for improvements in  [] LE / Core stability: LE, hip, and core control in self care, mobility, lifting, ambulation and eccentric single leg control. [] UE / Shoulder complex: cervical, scapular, scapulothoracic and UE control with self care, reaching, carrying, lifting, house/yardwork, driving, computer work.      NMR and Therapeutic Activities:    [x] (70551 or 61295) Provided verbal/tactile cueing for activities related to improving balance, coordination, kinesthetic sense, posture, motor skill, proprioception and motor activation to allow for proper function of   [x] LE / Core, hip and LE with self care and ADLs  [] UE / Shoulder complex: cervical, postural, scapular, scapulothoracic and UE control with self care, carrying, lifting, driving, computer work.   [] (06397) Gait Re-education- Provided training and instruction to the patient for proper LE, core and hip recruitment, positioning, and eccentric body weight control with ambulation re-education, including ascending & descending stairs     Home Management Training / Self Care:  [] (42653) Provided self-care/home management training related to activities of daily living and compensatory training, and/or use of adaptive equipment for improvement with: ADLs and compensatory training, meal preparation, safety procedures and instruction in use of adaptive equipment, including bathing, grooming, dressing, personal hygiene, basic household cleaning and chores.        Home Exercise Program:    [x] (07236) Reviewed/Progressed HEP activities related to strengthening, flexibility, endurance, ROM of   [] LE / Core stability: core, hip and LE for functional self-care, mobility, lifting and ambulation/stair navigation   [] UE / Shoulder complex: cervical, postural, scapular, scapulothoracic and UE control with self care, reaching, carrying, lifting, house/yardwork, driving, computer work  [] (67638)Reviewed/Progressed HEP activities related to improving balance, coordination, kinesthetic sense, posture, motor skill, proprioception of   [] LE: core, hip and LE for self care, mobility, lifting, and ambulation/stair navigation    [] UE / Shoulder complex: cervical, postural,  scapular, scapulothoracic and UE control with self care, reaching, carrying, lifting, house/yardwork, driving, computer work    Manual Treatments:  PROM / STM / Oscillations-Mobs:  G-I, II, III, IV (PA's, Inf., Post.)  [] (50089) Provided manual therapy to mobilize shoulder complex, hip, LE, and/or cervicothoracic/LS spine soft tissue/joints for the purpose of modulating pain, promoting relaxation,  increasing ROM, reducing/eliminating soft tissue swelling/inflammation/restriction, improving soft tissue extensibility and allowing for proper ROM for normal function with   [] LE / Core stability: self care, mobility, lifting and ambulation. [] UE / Shoulder complex: self care, reaching, carrying, lifting, house/yardwork, driving, computer work. Modalities:  [] (44203) Vasopneumatic compression: Utilized vasopneumatic compression to decrease edema / swelling for the purpose of improving mobility and quad tone / recruitment which will allow for increased overall function including but not limited to self-care, transfers, ambulation, and ascending / descending stairs. Charges:  Timed Code Treatment Minutes: 25   Total Treatment Minutes: 46     [x] EVAL - LOW (46261)   [] EVAL - MOD (90176)  [] EVAL - HIGH (72864)  [] RE-EVAL (45105)  [] TE (15687) x      [] Ionto  [x] NMR (42500) x 2     [] Vaso  [] Manual (38261) x      [] Ultrasound  [] TA x       [] Mech Traction (21010)  [] Gait Training x     [] ES (un) (52227):   [] Aquatic therapy x   [] Other:   [] Group:     GOALS:   Patient stated goal: improve balance  []? Progressing: []? Met: []? Not Met: []? Adjusted     Therapist goals for Patient:   Short Term Goals: To be achieved in: 2 weeks  1. Independent in HEP and progression per patient tolerance, in order to prevent re-injury. []? Progressing: []? Met: []? Not Met: []? Adjusted  2. Patient will have a decrease in pain to facilitate improvement in movement, function, and ADLs as indicated by Functional Deficits. []? Progressing: []? Met: []? Not Met: []? Adjusted     Long Term Goals: To be achieved in: 4 weeks  1. Patient to demonstrate TUG score <13 seconds to demonstrate improved fall risk to assist with reaching prior level of function. []? Progressing: []? Met: []? Not Met: []? Adjusted  2. Patient will demonstrate increased AROM to [facility], good scapular mobility and good hip ROM to allow for proper joint functioning as indicated by patients Functional Deficits. []? Progressing: []? Met: []?  Not Met: []? Adjusted  3. Patient will demonstrate an increase in strength to good shoulder & hip complex, and core activation to allow for proper functional mobility as indicated by patients Functional Deficits. []? Progressing: []? Met: []? Not Met: []? Adjusted  4. Patient will return to functional activities including standing >15 minutes without need for assistance. []? Progressing: []? Met: []? Not Met: []? Adjusted  5. Patient to improve MADRID score to >38 to improve static balance and decrease risk for falls. []? Progressing: []? Met: []? Not Met: []? Adjusted         Overall Progression Towards Functional goals/ Treatment Progress Update:  [] Patient is progressing as expected towards functional goals listed. [] Progression is slowed due to complexities/Impairments listed. [] Progression has been slowed due to co-morbidities. [x] Plan just implemented, too soon to assess goals progression <30days   [] Goals require adjustment due to lack of progress  [] Patient is not progressing as expected and requires additional follow up with physician  [] Other    Persisting Functional Limitations/Impairments:  []Sleeping []Sitting               [x]Standing []Transfers        [x]Walking [x]Kneeling               []Stairs []Squatting / bending   []ADLs []Reaching  [x]Lifting  []Housework  []Driving []Job related tasks  []Sports/Recreation []Other:        ASSESSMENT:  See eval    Treatment/Activity Tolerance:  [x] Patient able to complete tx [] Patient limited by fatigue  [] Patient limited by pain  [] Patient limited by other medical complications  [] Other:     Prognosis: [x] Good [] Fair  [] Poor    Patient Requires Follow-up: [x] Yes  [] No    Plan for next treatment session:Balance training, issue HEP    PLAN: See eval. PT 2x / week for 4 weeks.    [] Continue per plan of care [] Alter current plan (see comments)  [x] Plan of care initiated [] Hold pending MD visit [] Discharge    Electronically signed by: Francoise Krause Gavi PT  DPT    Note: If patient does not return for scheduled/ recommended follow up visits, his note will serve as a discharge from care along with most recent update on progress.

## 2021-08-10 ENCOUNTER — HOSPITAL ENCOUNTER (OUTPATIENT)
Dept: PHYSICAL THERAPY | Age: 86
Setting detail: THERAPIES SERIES
Discharge: HOME | End: 2021-08-10
Payer: MEDICARE

## 2021-08-10 PROCEDURE — 97112 NEUROMUSCULAR REEDUCATION: CPT

## 2021-08-10 NOTE — FLOWSHEET NOTE
[facility]  Phone: [phone]   Fax: [phone]    Physical Therapy Daily Treatment Note  Date:  8/10/2021    Patient Name:  Maria Fernanda Palmer    :  1935  MRN: [id number]  Medical/Treatment Diagnosis Information:  · Diagnosis: Ataxic gait  · Treatment Diagnosis: Impaired static and dynamic balance, proximal hip weakness, decreased standing tolerance, impaired gait  Insurance/Certification information:  PT Insurance Information: Medicare and UMR  Physician Information:  Referring Practitioner: Demarco Harper MD  Plan of care signed (Y/N): []  Yes []  No     Date of Patient follow up with Physician:      Progress Report: []  Yes  []  No     Date Range for reporting period:  Beginning  8/3/2021   Ending      Progress report due (10 Rx/or 30 days whichever is less): visit #10 or 3/3/39     Recertification due (POC duration/ or 90 days whichever is less): visit #10 or 11/3/21     Visit # Insurance Allowable Auth required? Date Range    Medical necessity []  Yes  [x]  No n/a     Latex Allergy:  [x]NO      []YES  Preferred Language for Healthcare:   [x]English       []Other:      Functional Scale/Test Evaluation 8/3/2021 30 day  60 day  Discharge   DGI 13       TUG 16.6      MADRID 29            Pain level:  0/10  Location:  n/a    SUBJECTIVE:  Pt reports no pain today. He is a little more fatigued, he just came from [employer]. Pt used the recumbent bike, the stair machine, leg press (was able to get up to his body weight for resistance), and other leg machines.       OBJECTIVE:       RESTRICTIONS/PRECAUTIONS: Wears catheter    Interventions/Exercises:   Therapeutic Exercises (88860) Resistance / level Sets/sec Reps Notes   Nustep L 4 4 min      IB - gastroc  2x30\" B     HR/TR   10                                Neuromuscular Re-ed (21395)        Static balance:  - DLS  - NBOS  - Staggered   Airex  Firm  Firm   2x30\"  30\"  30\" sea     3 way SLR with stance leg on Airex Airex  10 B    Cable column weighted walkouts 2pl  3 ea way                                       Therapeutic Activities (67479) /  Functional Tasks / Gait Training (34840)                                                                      Manual Intervention (02219)                                                     Modalities:     Pt. Education:  8/3/2021 patient educated on diagnosis, prognosis and expectations for rehab, all patient questions were answered    HEP instruction:      Therapeutic Exercise and NMR EXR  [x] (63709) Provided verbal/tactile cueing for activities related to strengthening, flexibility, endurance, ROM for improvements in  [x] LE / Core stability: LE, hip, and core control with self care, mobility, lifting, ambulation. [] UE / Shoulder complex: cervical, postural, scapular, scapulothoracic and UE control with self care, reaching, carrying, lifting, house/yardwork, driving, computer work.  [] (20375) Provided verbal/tactile cueing for activities related to improving balance, coordination, kinesthetic sense, posture, motor skill, proprioception to assist with   [] LE / Core stability: LE, hip, and core control in self care, mobility, lifting, ambulation and eccentric single leg control. [] UE / Shoulder complex: cervical, scapular, scapulothoracic and UE control with self care, reaching, carrying, lifting, house/yardwork, driving, computer work.   [] (88100) Therapist is in constant attendance of 2 or more patients providing skilled therapy interventions, but not providing any significant amount of measurable one-on-one time to either patient, for improvements in  [] LE / Core stability: LE, hip, and core control in self care, mobility, lifting, ambulation and eccentric single leg control. [] UE / Shoulder complex: cervical, scapular, scapulothoracic and UE control with self care, reaching, carrying, lifting, house/yardwork, driving, computer work.      NMR and Therapeutic Activities:    [x] (08567 or 99051) Provided verbal/tactile cueing for activities related to improving balance, coordination, kinesthetic sense, posture, motor skill, proprioception and motor activation to allow for proper function of   [x] LE / Core, hip and LE with self care and ADLs  [] UE / Shoulder complex: cervical, postural, scapular, scapulothoracic and UE control with self care, carrying, lifting, driving, computer work.   [] (80143) Gait Re-education- Provided training and instruction to the patient for proper LE, core and hip recruitment, positioning, and eccentric body weight control with ambulation re-education, including ascending & descending stairs     Home Management Training / Self Care:  [] (28004) Provided self-care/home management training related to activities of daily living and compensatory training, and/or use of adaptive equipment for improvement with: ADLs and compensatory training, meal preparation, safety procedures and instruction in use of adaptive equipment, including bathing, grooming, dressing, personal hygiene, basic household cleaning and chores.        Home Exercise Program:    [x] (86638) Reviewed/Progressed HEP activities related to strengthening, flexibility, endurance, ROM of   [] LE / Core stability: core, hip and LE for functional self-care, mobility, lifting and ambulation/stair navigation   [] UE / Shoulder complex: cervical, postural, scapular, scapulothoracic and UE control with self care, reaching, carrying, lifting, house/yardwork, driving, computer work  [] (88495)Reviewed/Progressed HEP activities related to improving balance, coordination, kinesthetic sense, posture, motor skill, proprioception of   [] LE: core, hip and LE for self care, mobility, lifting, and ambulation/stair navigation    [] UE / Shoulder complex: cervical, postural,  scapular, scapulothoracic and UE control with self care, reaching, carrying, lifting, house/yardwork, driving, computer work    Manual Treatments:  PROM / STM / Oscillations-Mobs:  G-I, II, III, IV (PA's, Inf., Post.)  [] (02970) Provided manual therapy to mobilize shoulder complex, hip, LE, and/or cervicothoracic/LS spine soft tissue/joints for the purpose of modulating pain, promoting relaxation,  increasing ROM, reducing/eliminating soft tissue swelling/inflammation/restriction, improving soft tissue extensibility and allowing for proper ROM for normal function with   [] LE / Core stability: self care, mobility, lifting and ambulation. [] UE / Shoulder complex: self care, reaching, carrying, lifting, house/yardwork, driving, computer work. Modalities:  [] (53980) Vasopneumatic compression: Utilized vasopneumatic compression to decrease edema / swelling for the purpose of improving mobility and quad tone / recruitment which will allow for increased overall function including but not limited to self-care, transfers, ambulation, and ascending / descending stairs. Charges:  Timed Code Treatment Minutes: 35   Total Treatment Minutes: 35   8/10:Session started 10 min late, pt was 6 min late then needed to use the restroom. [] EVAL - LOW (48632)   [] EVAL - MOD (54908)  [] EVAL - HIGH (27330)  [] RE-EVAL (15929)  [] TE (95678) x      [] Ionto  [x] NMR (53210) x  2    [] Vaso  [] Manual (15316) x      [] Ultrasound  [] TA x       [] Mech Traction (48685)  [] Gait Training x     [] ES (un) (37152):   [] Aquatic therapy x   [] Other:   [] Group:     GOALS:   Patient stated goal: improve balance  []? Progressing: []? Met: []? Not Met: []? Adjusted     Therapist goals for Patient:   Short Term Goals: To be achieved in: 2 weeks  1. Independent in HEP and progression per patient tolerance, in order to prevent re-injury. []? Progressing: []? Met: []? Not Met: []? Adjusted  2. Patient will have a decrease in pain to facilitate improvement in movement, function, and ADLs as indicated by Functional Deficits. []?  Progressing: []? Met: []? Not Met: []? Adjusted     Long Term Goals: To be achieved in: 4 weeks  1. Patient to demonstrate TUG score <13 seconds to demonstrate improved fall risk to assist with reaching prior level of function. []? Progressing: []? Met: []? Not Met: []? Adjusted  2. Patient will demonstrate [facility], good scapular mobility and good hip ROM to allow for proper joint functioning as indicated by patients Functional Deficits. []? Progressing: []? Met: []? Not Met: []? Adjusted  3. Patient will demonstrate an increase in strength to good shoulder & hip complex, and core activation to allow for proper functional mobility as indicated by patients Functional Deficits. []? Progressing: []? Met: []? Not Met: []? Adjusted  4. Patient will return to functional activities including standing >15 minutes without need for assistance. []? Progressing: []? Met: []? Not Met: []? Adjusted  5. Patient to improve MADRID score to >38 to improve static balance and decrease risk for falls. []? Progressing: []? Met: []? Not Met: []? Adjusted         Overall Progression Towards Functional goals/ Treatment Progress Update:  [] Patient is progressing as expected towards functional goals listed. [] Progression is slowed due to complexities/Impairments listed. [] Progression has been slowed due to co-morbidities.   [x] Plan just implemented, too soon to assess goals progression <30days   [] Goals require adjustment due to lack of progress  [] Patient is not progressing as expected and requires additional follow up with physician  [] Other    Persisting Functional Limitations/Impairments:  []Sleeping []Sitting               [x]Standing []Transfers        [x]Walking [x]Kneeling               []Stairs []Squatting / bending   []ADLs []Reaching  [x]Lifting  []Housework  []Driving []Job related tasks  []Sports/Recreation []Other:        ASSESSMENT:  Pt with moderate difficulty with cable column weighted walkouts and needed CGA to min A to maintain balance. Pt with tendency to weight shift posteriorly while static standing. Unable to correct with verbal cueing. Will continue with balance training to decrease fall risk. PT suggested pt not go to PF on the same day as therapy to conserve energy. Treatment/Activity Tolerance:  [x] Patient able to complete tx [] Patient limited by fatigue  [] Patient limited by pain  [] Patient limited by other medical complications  [] Other:     Prognosis: [x] Good [] Fair  [] Poor    Patient Requires Follow-up: [x] Yes  [] No    Plan for next treatment session:Balance training, issue HEP    PLAN: See eval. PT 2x / week for 4 weeks. [x] Continue per plan of care [] Alter current plan (see comments)  [] Plan of care initiated [] Hold pending MD visit [] Discharge    Electronically signed by: Natalio Iyer, PT  DPT    Note: If patient does not return for scheduled/ recommended follow up visits, his note will serve as a discharge from care along with most recent update on progress.

## 2021-08-13 ENCOUNTER — HOSPITAL ENCOUNTER (OUTPATIENT)
Dept: PHYSICAL THERAPY | Age: 86
Setting detail: THERAPIES SERIES
Discharge: HOME | End: 2021-08-13
Payer: MEDICARE

## 2021-08-13 PROCEDURE — 97112 NEUROMUSCULAR REEDUCATION: CPT

## 2021-08-13 PROCEDURE — 97530 THERAPEUTIC ACTIVITIES: CPT

## 2021-08-13 PROCEDURE — 97110 THERAPEUTIC EXERCISES: CPT

## 2021-08-13 NOTE — FLOWSHEET NOTE
[facility]  Phone: [phone]   Fax: [phone]    Physical Therapy Daily Treatment Note  Date:  2021    Patient Name:  Chuck Torres    :  1935  MRN: [id number]  Medical/Treatment Diagnosis Information:  · Diagnosis: Ataxic gait  · Treatment Diagnosis: Impaired static and dynamic balance, proximal hip weakness, decreased standing tolerance, impaired gait  Insurance/Certification information:  PT Insurance Information: Medicare and R  Physician Information:  Referring Practitioner: Hortensia Kayser, MD  Plan of care signed (Y/N): []  Yes []  No     Date of Patient follow up with Physician:      Progress Report: []  Yes  []  No     Date Range for reporting period:  Beginning  8/3/2021   Ending      Progress report due (10 Rx/or 30 days whichever is less): visit #10 or 41     Recertification due (POC duration/ or 90 days whichever is less): visit #10 or 11/3/21     Visit # Insurance Allowable Auth required? Date Range   3/8 Medical necessity []  Yes  [x]  No n/a     Latex Allergy:  [x]NO      []YES  Preferred Language for Healthcare:   [x]English       []Other:      Functional Scale/Test Evaluation 8/3/2021 30 day  60 day  Discharge   DGI 13       TUG 16.6      MADRID 29            Pain level:  0/10  Location:  n/a    SUBJECTIVE:  Pt reports no pain today. He is a little more fatigued, he just came from [employer]. Pt used the recumbent bike, the stair machine, leg press (was able to get up to his body weight for resistance), and other leg machines.       OBJECTIVE:       RESTRICTIONS/PRECAUTIONS: Wears catheter    Interventions/Exercises:   Therapeutic Exercises (69702) Resistance / level Sets/sec Reps Notes   Nustep L 4 4 min      IB - gastroc  2x30\" B     HR/TR   10    Step ups No UE assist  10 B fwd, lat CGA/Min A                         Neuromuscular Re-ed (12628)        Static balance:  - DLS  - NBOS  - Staggered   Airex  Firm, EC  Firm   2x30\"  2 x 30\"  2 x 30\" ea     3 way SLR with stance leg on Airex    Cable column weighted walkouts           dribbling with ambulation  X 2 laps  SBA                        Therapeutic Activities (68092) /  Functional Tasks / Gait Training (19229)       STS:  Mod height  Low height  Mod with EC     10   5  5                                                            Manual Intervention (96888)                                                     Modalities:     Pt. Education:  8/3/2021 patient educated on diagnosis, prognosis and expectations for rehab, all patient questions were answered    HEP instruction:      Therapeutic Exercise and NMR EXR  [x] (86518) Provided verbal/tactile cueing for activities related to strengthening, flexibility, endurance, ROM for improvements in  [x] LE / Core stability: LE, hip, and core control with self care, mobility, lifting, ambulation. [] UE / Shoulder complex: cervical, postural, scapular, scapulothoracic and UE control with self care, reaching, carrying, lifting, house/yardwork, driving, computer work.  [] (90927) Provided verbal/tactile cueing for activities related to improving balance, coordination, kinesthetic sense, posture, motor skill, proprioception to assist with   [] LE / Core stability: LE, hip, and core control in self care, mobility, lifting, ambulation and eccentric single leg control. [] UE / Shoulder complex: cervical, scapular, scapulothoracic and UE control with self care, reaching, carrying, lifting, house/yardwork, driving, computer work.   [] (23906) Therapist is in constant attendance of 2 or more patients providing skilled therapy interventions, but not providing any significant amount of measurable one-on-one time to either patient, for improvements in  [] LE / Core stability: LE, hip, and core control in self care, mobility, lifting, ambulation and eccentric single leg control.    [] UE / Shoulder complex: cervical, scapular, scapulothoracic and UE control with self care, reaching, carrying, lifting, house/yardwork, driving, computer work. NMR and Therapeutic Activities:    [x] (43280 or 37772) Provided verbal/tactile cueing for activities related to improving balance, coordination, kinesthetic sense, posture, motor skill, proprioception and motor activation to allow for proper function of   [x] LE / Core, hip and LE with self care and ADLs  [] UE / Shoulder complex: cervical, postural, scapular, scapulothoracic and UE control with self care, carrying, lifting, driving, computer work.   [] (40449) Gait Re-education- Provided training and instruction to the patient for proper LE, core and hip recruitment, positioning, and eccentric body weight control with ambulation re-education, including ascending & descending stairs     Home Management Training / Self Care:  [] (95343) Provided self-care/home management training related to activities of daily living and compensatory training, and/or use of adaptive equipment for improvement with: ADLs and compensatory training, meal preparation, safety procedures and instruction in use of adaptive equipment, including bathing, grooming, dressing, personal hygiene, basic household cleaning and chores.        Home Exercise Program:    [x] (32192) Reviewed/Progressed HEP activities related to strengthening, flexibility, endurance, ROM of   [] LE / Core stability: core, hip and LE for functional self-care, mobility, lifting and ambulation/stair navigation   [] UE / Shoulder complex: cervical, postural, scapular, scapulothoracic and UE control with self care, reaching, carrying, lifting, house/yardwork, driving, computer work  [] (80555)Reviewed/Progressed HEP activities related to improving balance, coordination, kinesthetic sense, posture, motor skill, proprioception of   [] LE: core, hip and LE for self care, mobility, lifting, and ambulation/stair navigation    [] UE / Shoulder complex: cervical, postural,  scapular, scapulothoracic and UE control with self care, reaching, carrying, lifting, house/yardwork, driving, computer work    Manual Treatments:  PROM / STM / Oscillations-Mobs:  G-I, II, III, IV (PA's, Inf., Post.)  [] (86450) Provided manual therapy to mobilize shoulder complex, hip, LE, and/or cervicothoracic/LS spine soft tissue/joints for the purpose of modulating pain, promoting relaxation,  increasing ROM, reducing/eliminating soft tissue swelling/inflammation/restriction, improving soft tissue extensibility and allowing for proper ROM for normal function with   [] LE / Core stability: self care, mobility, lifting and ambulation. [] UE / Shoulder complex: self care, reaching, carrying, lifting, house/yardwork, driving, computer work. Modalities:  [] (43930) Vasopneumatic compression: Utilized vasopneumatic compression to decrease edema / swelling for the purpose of improving mobility and quad tone / recruitment which will allow for increased overall function including but not limited to self-care, transfers, ambulation, and ascending / descending stairs. Charges:  Timed Code Treatment Minutes: 45   Total Treatment Minutes: 45   8/10:Session started 10 min late, pt was 6 min late then needed to use the restroom. [] EVAL - LOW (39709)   [] EVAL - MOD (00579)  [] EVAL - HIGH (74070)  [] RE-EVAL (68585)  [x] TE (45526) x 1     [] Ionto  [x] NMR (25352) x  1    [] Vaso  [] Manual (05563) x      [] Ultrasound  [x] TA x 1      [] Mech Traction (27899)  [] Gait Training x     [] ES (un) (57975):   [] Aquatic therapy x   [] Other:   [] Group:     GOALS:   Patient stated goal: improve balance  []? Progressing: []? Met: []? Not Met: []? Adjusted     Therapist goals for Patient:   Short Term Goals: To be achieved in: 2 weeks  1. Independent in HEP and progression per patient tolerance, in order to prevent re-injury. []? Progressing: []? Met: []? Not Met: []? Adjusted  2.  Patient will have a decrease in pain to facilitate improvement in movement, function, and ADLs as indicated by Functional Deficits. []? Progressing: []? Met: []? Not Met: []? Adjusted     Long Term Goals: To be achieved in: 4 weeks  1. Patient to demonstrate TUG score <13 seconds to demonstrate improved fall risk to assist with reaching prior level of function. []? Progressing: []? Met: []? Not Met: []? Adjusted  2. Patient will demonstrate increased AROM to [facility], good scapular mobility and good hip ROM to allow for proper joint functioning as indicated by patients Functional Deficits. []? Progressing: []? Met: []? Not Met: []? Adjusted  3. Patient will demonstrate an increase in strength to good shoulder & hip complex, and core activation to allow for proper functional mobility as indicated by patients Functional Deficits. []? Progressing: []? Met: []? Not Met: []? Adjusted  4. Patient will return to functional activities including standing >15 minutes without need for assistance. []? Progressing: []? Met: []? Not Met: []? Adjusted  5. Patient to improve MADRID score to >38 to improve static balance and decrease risk for falls. []? Progressing: []? Met: []? Not Met: []? Adjusted         Overall Progression Towards Functional goals/ Treatment Progress Update:  [] Patient is progressing as expected towards functional goals listed. [] Progression is slowed due to complexities/Impairments listed. [] Progression has been slowed due to co-morbidities.   [x] Plan just implemented, too soon to assess goals progression <30days   [] Goals require adjustment due to lack of progress  [] Patient is not progressing as expected and requires additional follow up with physician  [] Other    Persisting Functional Limitations/Impairments:  []Sleeping []Sitting               [x]Standing []Transfers        [x]Walking [x]Kneeling               []Stairs []Squatting / bending   []ADLs []Reaching  [x]Lifting  []Housework  []Driving []Job related tasks  []Sports/Recreation []Other:        ASSESSMENT:  Progressed endurance exercises this date. Pt with fatigue post session 7/10 in RPE. Monitor NV for prolonged tolerance. Treatment/Activity Tolerance:  [x] Patient able to complete tx [] Patient limited by fatigue  [] Patient limited by pain  [] Patient limited by other medical complications  [] Other:     Prognosis: [x] Good [] Fair  [] Poor    Patient Requires Follow-up: [x] Yes  [] No    Plan for next treatment session:Balance training, issue HEP    PLAN: See eval. PT 2x / week for 4 weeks. [x] Continue per plan of care [] Alter current plan (see comments)  [] Plan of care initiated [] Hold pending MD visit [] Discharge    Electronically signed by: Pamela Grier, PT  DPT    Note: If patient does not return for scheduled/ recommended follow up visits, his note will serve as a discharge from care along with most recent update on progress.

## 2021-08-17 ENCOUNTER — HOSPITAL ENCOUNTER (OUTPATIENT)
Dept: PHYSICAL THERAPY | Age: 86
Setting detail: THERAPIES SERIES
Discharge: HOME | End: 2021-08-17
Payer: MEDICARE

## 2021-08-17 PROCEDURE — 97112 NEUROMUSCULAR REEDUCATION: CPT

## 2021-08-17 PROCEDURE — 97110 THERAPEUTIC EXERCISES: CPT

## 2021-08-17 NOTE — FLOWSHEET NOTE
[facility]  Phone: [phone]   Fax: [phone]    Physical Therapy Daily Treatment Note  Date:  2021    Patient Name:  Jaspal Garcia    :  1935  MRN: [id number]  Medical/Treatment Diagnosis Information:  · Diagnosis: Ataxic gait  · Treatment Diagnosis: Impaired static and dynamic balance, proximal hip weakness, decreased standing tolerance, impaired gait  Insurance/Certification information:  PT Insurance Information: Medicare and UMR  Physician Information:  Referring Practitioner: Alexi Martinez MD  Plan of care signed (Y/N): []  Yes [x]  No     Date of Patient follow up with Physician:      Progress Report: []  Yes  []  No     Date Range for reporting period:  Beginning  8/3/2021   Ending      Progress report due (10 Rx/or 30 days whichever is less): visit #10 or 4/3/16     Recertification due (POC duration/ or 90 days whichever is less): visit #10 or 11/3/21     Visit # Insurance Allowable Auth required? Date Range    Medical necessity []  Yes  [x]  No n/a     Latex Allergy:  [x]NO      []YES  Preferred Language for Healthcare:   [x]English       []Other:      Functional Scale/Test Evaluation 8/3/2021 30 day  60 day  Discharge   DGI 13       TUG 16.6      MADRID 29            Pain level:  0/10  Location:  n/a    SUBJECTIVE:  Pt reports he is just more tired today, although he hasn't done as much. He has not gone to [employer] yet today but will go afterwards if he is not too tired. Pt fell yesterday when stepping down off of a curb. Did not his it head, has scrapes on his L elbow and L knee.      OBJECTIVE:       RESTRICTIONS/PRECAUTIONS: Wears catheter    Interventions/Exercises:   Therapeutic Exercises (31201) Resistance / level Sets/sec Reps Notes   Nustep L 4 5 min      IB - gastroc  2x30\" B     HR/TR   10    Step ups 6 in  10 B fwd, lat B UE support used, pt cued to keep hands in front to avoid posterior COG Neuromuscular Re-ed (27585)        Static balance:  - DLS  - NBOS  - Tandem  - SLS   Airex  Airex, EC  Airex  Airex   2x30\"  2 x 30\" ea  2 x 30\" ea  2 x 30\" B      2 sets EO, 2 sets EC       3 way SLR with stance leg on Airex    Cable column weighted walkouts 2pl  3 ea way Fwd, retro          dribbling with ambulation With red swiss ball  X 3/4 lap around clinic  SBA                        Therapeutic Activities (15995) /  Functional Tasks / Gait Training (11121)       STS:  Mod height  Low height  Mod with EC                                                                 Manual Intervention (78646)                                                     Modalities:     Pt. Education:  8/3/2021 patient educated on diagnosis, prognosis and expectations for rehab, all patient questions were answered    HEP instruction:      Therapeutic Exercise and NMR EXR  [x] (99208) Provided verbal/tactile cueing for activities related to strengthening, flexibility, endurance, ROM for improvements in  [x] LE / Core stability: LE, hip, and core control with self care, mobility, lifting, ambulation. [] UE / Shoulder complex: cervical, postural, scapular, scapulothoracic and UE control with self care, reaching, carrying, lifting, house/yardwork, driving, computer work.  [] (34550) Provided verbal/tactile cueing for activities related to improving balance, coordination, kinesthetic sense, posture, motor skill, proprioception to assist with   [] LE / Core stability: LE, hip, and core control in self care, mobility, lifting, ambulation and eccentric single leg control.    [] UE / Shoulder complex: cervical, scapular, scapulothoracic and UE control with self care, reaching, carrying, lifting, house/yardwork, driving, computer work.   [] (31577) Therapist is in constant attendance of 2 or more patients providing skilled therapy interventions, but not providing any significant amount of measurable one-on-one time to either patient, for improvements in  [] LE / Core stability: LE, hip, and core control in self care, mobility, lifting, ambulation and eccentric single leg control. [] UE / Shoulder complex: cervical, scapular, scapulothoracic and UE control with self care, reaching, carrying, lifting, house/yardwork, driving, computer work. NMR and Therapeutic Activities:    [x] (69924 or 40910) Provided verbal/tactile cueing for activities related to improving balance, coordination, kinesthetic sense, posture, motor skill, proprioception and motor activation to allow for proper function of   [x] LE / Core, hip and LE with self care and ADLs  [] UE / Shoulder complex: cervical, postural, scapular, scapulothoracic and UE control with self care, carrying, lifting, driving, computer work.   [] (21154) Gait Re-education- Provided training and instruction to the patient for proper LE, core and hip recruitment, positioning, and eccentric body weight control with ambulation re-education, including ascending & descending stairs     Home Management Training / Self Care:  [] (73251) Provided self-care/home management training related to activities of daily living and compensatory training, and/or use of adaptive equipment for improvement with: ADLs and compensatory training, meal preparation, safety procedures and instruction in use of adaptive equipment, including bathing, grooming, dressing, personal hygiene, basic household cleaning and chores.        Home Exercise Program:    [x] (74689) Reviewed/Progressed HEP activities related to strengthening, flexibility, endurance, ROM of   [] LE / Core stability: core, hip and LE for functional self-care, mobility, lifting and ambulation/stair navigation   [] UE / Shoulder complex: cervical, postural, scapular, scapulothoracic and UE control with self care, reaching, carrying, lifting, house/yardwork, driving, computer work  [] (43893)Reviewed/Progressed HEP activities related to improving balance, coordination, order to prevent re-injury. []? Progressing: []? Met: []? Not Met: []? Adjusted  2. Patient will have a decrease in pain to facilitate improvement in movement, function, and ADLs as indicated by Functional Deficits. []? Progressing: []? Met: []? Not Met: []? Adjusted     Long Term Goals: To be achieved in: 4 weeks  1. Patient to demonstrate TUG score <13 seconds to demonstrate improved fall risk to assist with reaching prior level of function. []? Progressing: []? Met: []? Not Met: []? Adjusted  2. Patient will demonstrate increased AROM to [facility], good scapular mobility and good hip ROM to allow for proper joint functioning as indicated by patients Functional Deficits. []? Progressing: []? Met: []? Not Met: []? Adjusted  3. Patient will demonstrate an increase in strength to good shoulder & hip complex, and core activation to allow for proper functional mobility as indicated by patients Functional Deficits. []? Progressing: []? Met: []? Not Met: []? Adjusted  4. Patient will return to functional activities including standing >15 minutes without need for assistance. []? Progressing: []? Met: []? Not Met: []? Adjusted  5. Patient to improve MADRID score to >38 to improve static balance and decrease risk for falls. []? Progressing: []? Met: []? Not Met: []? Adjusted         Overall Progression Towards Functional goals/ Treatment Progress Update:  [] Patient is progressing as expected towards functional goals listed. [] Progression is slowed due to complexities/Impairments listed. [] Progression has been slowed due to co-morbidities.   [x] Plan just implemented, too soon to assess goals progression <30days   [] Goals require adjustment due to lack of progress  [] Patient is not progressing as expected and requires additional follow up with physician  [] Other    Persisting Functional

## 2021-08-20 ENCOUNTER — HOSPITAL ENCOUNTER (OUTPATIENT)
Dept: PHYSICAL THERAPY | Age: 86
Setting detail: THERAPIES SERIES
Discharge: HOME | End: 2021-08-20
Payer: MEDICARE

## 2021-08-20 NOTE — FLOWSHEET NOTE
Physical Therapy  Cancellation/No-show Note  Patient Name:  Ghanshyam Davies  :  1935   Date:  2021  Cancelled visits to date: 0  No-shows to date: 1    Patient status for today's appointment patient:  []  Cancelled  []  Rescheduled appointment  [x]  No-show      Reason given by patient:  []  Patient ill  []  Conflicting appointment  []  No transportation    []  Conflict with work  [x]  No reason given  []  Other:     Comments:      Phone call information:   []  Phone call made today to patient at _ time at number provided:      []  Patient answered, conversation as follows:    []  Patient did not answer, message left as follows:  [x]  Phone call not made today  []  Phone call not needed - pt contacted us to cancel and provided reason for cancellation.      Electronically signed by:  Eliza Thomas PT

## 2021-08-24 ENCOUNTER — HOSPITAL ENCOUNTER (OUTPATIENT)
Dept: PHYSICAL THERAPY | Age: 86
Setting detail: THERAPIES SERIES
Discharge: HOME | End: 2021-08-24
Payer: MEDICARE

## 2021-08-24 NOTE — FLOWSHEET NOTE
Physical Therapy  Cancellation/No-show Note  Patient Name:  Ghanshyam Davies  :  1935   Date:  2021  Cancelled visits to date: 1  No-shows to date: 1    Patient status for today's appointment patient:  [x]  Cancelled   []  Rescheduled appointment  [x]  No-show      Reason given by patient:  []  Patient ill  []  Conflicting appointment  []  No transportation    []  Conflict with work  []  No reason given  [x]  Other:     Comments:  Pt was at the doctor's office and something came up and pt will not be able to make it to therapy     Phone call information:   []  Phone call made today to patient at _ time at number provided:      []  Patient answered, conversation as follows:    []  Patient did not answer, message left as follows:  []  Phone call not made today  [x]  Phone call not needed - pt contacted us to cancel and provided reason for cancellation.      Electronically signed by:  Brad Peralta PT DPT

## 2021-08-27 ENCOUNTER — HOSPITAL ENCOUNTER (OUTPATIENT)
Dept: PHYSICAL THERAPY | Age: 86
Setting detail: THERAPIES SERIES
Discharge: HOME | End: 2021-08-27
Payer: MEDICARE

## 2021-08-27 PROCEDURE — 97112 NEUROMUSCULAR REEDUCATION: CPT

## 2021-08-27 NOTE — FLOWSHEET NOTE
[facility]  Phone: [phone]   Fax: [phone]    Physical Therapy Daily Treatment Note  Date:  2021    Patient Name:  Maria Fernanda Palmer    :  1935  MRN: [id number]  Medical/Treatment Diagnosis Information:  · Diagnosis: Ataxic gait  · Treatment Diagnosis: Impaired static and dynamic balance, proximal hip weakness, decreased standing tolerance, impaired gait  Insurance/Certification information:  PT Insurance Information: Medicare and R  Physician Information:  Referring Practitioner: Demarco Harper MD  Plan of care signed (Y/N): []  Yes [x]  No     Date of Patient follow up with Physician:      Progress Report: []  Yes  []  No     Date Range for reporting period:  Beginning  8/3/2021   Ending      Progress report due (10 Rx/or 30 days whichever is less): visit #10 or 01     Recertification due (POC duration/ or 90 days whichever is less): visit #10 or 11/3/21     Visit # Insurance Allowable Auth required? Date Range    Medical necessity []  Yes  [x]  No n/a     Latex Allergy:  [x]NO      []YES  Preferred Language for Healthcare:   [x]English       []Other:      Functional Scale/Test Evaluation 8/3/2021 30 day  60 day  Discharge   DGI 13       TUG 16.6      MADRID 29            Pain level:  0/10  Location:  n/a    SUBJECTIVE:  Pt reports that he is doing well, no falls since the last time he was here. Pt had to miss a few session because he had a biopsy taken on his nose. Hasn't gotten the results yet.      OBJECTIVE:       RESTRICTIONS/PRECAUTIONS: Wears catheter    Interventions/Exercises:   Therapeutic Exercises (64156) Resistance / level Sets/sec Reps Notes   Nustep L 4 5 min      IB - gastroc     HR/TR    Step ups B UE support used, pt cued to keep hands in front to avoid posterior COG                         Neuromuscular Re-ed (23325)        Static balance:  - DLS  - NBOS  - Tandem  - SLS     2 sets EO, 2 sets EC       3 way SLR with stance leg on Airex    Cable column weighted walkouts Fwd, retro       dribbling with ambulation SBA          Turf Room:  Ambulation fwd  Ambulation retro  Ambulation lat  Marching ambulation  Ambulation with cone taps (double tap)  Step up and overs (fwd and lat)  Step over (keep walking)    2 x 45'  2 x 45'  4 x 45'  2 x 45'  2 x 45'  x10 ea  x10 B            Therapeutic Activities (42592) /  Functional Tasks / Gait Training (04709)       STS:  Mod height  Low height  Mod with EC                                                                 Manual Intervention (57372)                                                     Modalities:     Pt. Education:  8/3/2021 patient educated on diagnosis, prognosis and expectations for rehab, all patient questions were answered    HEP instruction:      Therapeutic Exercise and NMR EXR  [x] (63979) Provided verbal/tactile cueing for activities related to strengthening, flexibility, endurance, ROM for improvements in  [x] LE / Core stability: LE, hip, and core control with self care, mobility, lifting, ambulation. [] UE / Shoulder complex: cervical, postural, scapular, scapulothoracic and UE control with self care, reaching, carrying, lifting, house/yardwork, driving, computer work.  [] (59981) Provided verbal/tactile cueing for activities related to improving balance, coordination, kinesthetic sense, posture, motor skill, proprioception to assist with   [] LE / Core stability: LE, hip, and core control in self care, mobility, lifting, ambulation and eccentric single leg control.    [] UE / Shoulder complex: cervical, scapular, scapulothoracic and UE control with self care, reaching, carrying, lifting, house/yardwork, driving, computer work.   [] (65983) Therapist is in constant attendance of 2 or more patients providing skilled therapy interventions, but not providing any significant amount of measurable one-on-one time to either patient, for improvements in  [] LE / Core motor skill, proprioception of   [] LE: core, hip and LE for self care, mobility, lifting, and ambulation/stair navigation    [] UE / Shoulder complex: cervical, postural,  scapular, scapulothoracic and UE control with self care, reaching, carrying, lifting, house/yardwork, driving, computer work    Manual Treatments:  PROM / STM / Oscillations-Mobs:  G-I, II, III, IV (PA's, Inf., Post.)  [] (80659) Provided manual therapy to mobilize shoulder complex, hip, LE, and/or cervicothoracic/LS spine soft tissue/joints for the purpose of modulating pain, promoting relaxation,  increasing ROM, reducing/eliminating soft tissue swelling/inflammation/restriction, improving soft tissue extensibility and allowing for proper ROM for normal function with   [] LE / Core stability: self care, mobility, lifting and ambulation. [] UE / Shoulder complex: self care, reaching, carrying, lifting, house/yardwork, driving, computer work. Modalities:  [] (82605) Vasopneumatic compression: Utilized vasopneumatic compression to decrease edema / swelling for the purpose of improving mobility and quad tone / recruitment which will allow for increased overall function including but not limited to self-care, transfers, ambulation, and ascending / descending stairs. Charges:  Timed Code Treatment Minutes: 45   Total Treatment Minutes: 45     [] EVAL - LOW (21343)   [] EVAL - MOD (22523)  [] EVAL - HIGH (59978)  [] RE-EVAL (73389)  [] TE (29865) x 1     [] Ionto  [x] NMR (73275) x  3    [] Vaso  [] Manual (01924) x      [] Ultrasound  [] TA x       [] Mech Traction (27287)  [] Gait Training x     [] ES (un) (28047):   [] Aquatic therapy x   [] Other:   [] Group:     GOALS:   Patient stated goal: improve balance  []? Progressing: []? Met: []? Not Met: []? Adjusted     Therapist goals for Patient:   Short Term Goals: To be achieved in: 2 weeks  1. Independent in HEP and progression per patient tolerance, in order to prevent re-injury. []? Progressing: []? Met: []? Not Met: []? Adjusted  2. Patient will have a decrease in pain to facilitate improvement in movement, function, and ADLs as indicated by Functional Deficits. []? Progressing: []? Met: []? Not Met: []? Adjusted     Long Term Goals: To be achieved in: 4 weeks  1. Patient to demonstrate TUG score <13 seconds to demonstrate improved fall risk to assist with reaching prior level of function. []? Progressing: []? Met: []? Not Met: []? Adjusted  2. Patient will demonstrate increased AROM to [facility], good scapular mobility and good hip ROM to allow for proper joint functioning as indicated by patients Functional Deficits. []? Progressing: []? Met: []? Not Met: []? Adjusted  3. Patient will demonstrate an increase in strength to good shoulder & hip complex, and core activation to allow for proper functional mobility as indicated by patients Functional Deficits. []? Progressing: []? Met: []? Not Met: []? Adjusted  4. Patient will return to functional activities including standing >15 minutes without need for assistance. []? Progressing: []? Met: []? Not Met: []? Adjusted  5. Patient to improve MADRID score to >38 to improve static balance and decrease risk for falls. []? Progressing: []? Met: []? Not Met: []? Adjusted         Overall Progression Towards Functional goals/ Treatment Progress Update:  [] Patient is progressing as expected towards functional goals listed. [] Progression is slowed due to complexities/Impairments listed. [] Progression has been slowed due to co-morbidities.   [x] Plan just implemented, too soon to assess goals progression <30days   [] Goals require adjustment due to lack of progress  [] Patient is not progressing as expected and requires additional follow up with physician  [] Other    Persisting Functional Limitations/Impairments:  []Sleeping []Sitting               [x]Standing []Transfers        [x]Walking [x]Kneeling               []Stairs []Squatting / bending   []ADLs []Reaching  [x]Lifting  []Housework  []Driving []Job related tasks  []Sports/Recreation []Other:        ASSESSMENT: Pt challenged appropriately by the addition of the turf room. Pt shows most impairment with cone taps and step up, uses little arm swing during ambulation and is very stiff. Will continue to progress as able. Treatment/Activity Tolerance:  [x] Patient able to complete tx [] Patient limited by fatigue  [] Patient limited by pain  [] Patient limited by other medical complications  [] Other:     Prognosis: [x] Good [] Fair  [] Poor    Patient Requires Follow-up: [x] Yes  [] No    Plan for next treatment session:Balance training, issue HEP    PLAN: See eval. PT 2x / week for 4 weeks. [x] Continue per plan of care [] Alter current plan (see comments)  [] Plan of care initiated [] Hold pending MD visit [] Discharge    Electronically signed by: Vikki Riojas, PT  DPT    Note: If patient does not return for scheduled/ recommended follow up visits, his note will serve as a discharge from care along with most recent update on progress.

## 2021-09-08 ENCOUNTER — APPOINTMENT (OUTPATIENT)
Dept: PHYSICAL THERAPY | Age: 86
End: 2021-09-08
Payer: MEDICARE

## 2021-09-15 ENCOUNTER — HOSPITAL ENCOUNTER (OUTPATIENT)
Dept: PHYSICAL THERAPY | Age: 86
Setting detail: THERAPIES SERIES
Discharge: HOME | End: 2021-09-15
Payer: MEDICARE

## 2021-09-15 PROCEDURE — 97112 NEUROMUSCULAR REEDUCATION: CPT

## 2021-09-15 NOTE — FLOWSHEET NOTE
[address] [facility] Physical Therapy  Phone: [phone]   Fax: [phone]    Physical Therapy Daily Treatment Note  Date:  9/15/2021    Patient Name:  Narciso Wallace    :  1935  MRN: [id number]  Medical/Treatment Diagnosis Information:  · Diagnosis: Ataxic gait  · Treatment Diagnosis: Impaired static and dynamic balance, proximal hip weakness, decreased standing tolerance, impaired gait  Insurance/Certification information:  PT Insurance Information: Medicare and R  Physician Information:  Referring Practitioner: Efraín Whitt MD  Plan of care signed (Y/N): []  Yes [x]  No     Date of Patient follow up with Physician:      Progress Report: []  Yes  []  No     Date Range for reporting period:  Beginning  8/3/2021   Ending      Progress report due (10 Rx/or 30 days whichever is less): visit #10 or 32     Recertification due (POC duration/ or 90 days whichever is less): visit #10 or 11/3/21     Visit # Insurance Allowable Auth required? Date Range    Medical necessity []  Yes  [x]  No n/a     Latex Allergy:  [x]NO      []YES  Preferred Language for Healthcare:   [x]English       []Other:      Functional Scale/Test Evaluation 8/3/2021 30 day  60 day  Discharge   DGI 13       TUG 16.6      MADRID 29            Pain level:  0/10  Location:  n/a    SUBJECTIVE:  Pt reports that his nose biopsy came back as skin cancer, but not melanoma. Will have a meeting with his doctor soon to discuss treatment. Balance hasn't been too good, wanting updated HEP, feels like with HEP he will be ready for d/c at end of POC.      OBJECTIVE:       RESTRICTIONS/PRECAUTIONS: Wears catheter    Interventions/Exercises:   Therapeutic Exercises (46052) Resistance / level Sets/sec Reps Notes   Nustep L 4 5 min      IB - gastroc 2x30\" B     HR/TR  10    Step ups B UE support used, pt cued to keep hands in front to avoid posterior COG                         Neuromuscular Re-ed (63375)        Static balance:  - DLS  - NBOS  - Tandem  - SLS   Airex  Airex, EC  Airex     2x30\"  2 x 30\" ea  2 x 30\" ea        2 sets EO, 2 sets EC       3 way SLR  2 10 B No UE assist for 2nd set   Cable column weighted walkouts Fwd, retro       dribbling with ambulation SBA          Turf Room:  Ambulation fwd  Ambulation retro  Ambulation lat  Marching ambulation  Ambulation with cone taps (double tap)  Step up and overs (fwd and lat)  Step over (keep walking)                HEP education  x10'            Therapeutic Activities (02026) /  Functional Tasks / Gait Training (26146)       STS:  Mod height  Low height  Mod with EC                                                                 Manual Intervention (92144)                                                     Modalities:     Pt. Education:  8/3/2021 patient educated on diagnosis, prognosis and expectations for rehab, all patient questions were answered    HEP instruction:  9/15: Rhomberg EC- firm,  Tandem- firm, Normal ROSANNA with head turns (vert and horizontal)-foam.  HS stretch, prone laying for HF flexibility. HO issued. Therapeutic Exercise and NMR EXR  [x] (40937) Provided verbal/tactile cueing for activities related to strengthening, flexibility, endurance, ROM for improvements in  [x] LE / Core stability: LE, hip, and core control with self care, mobility, lifting, ambulation. [] UE / Shoulder complex: cervical, postural, scapular, scapulothoracic and UE control with self care, reaching, carrying, lifting, house/yardwork, driving, computer work.  [] (90455) Provided verbal/tactile cueing for activities related to improving balance, coordination, kinesthetic sense, posture, motor skill, proprioception to assist with   [] LE / Core stability: LE, hip, and core control in self care, mobility, lifting, ambulation and eccentric single leg control.    [] UE / Shoulder complex: cervical, scapular, scapulothoracic and UE control with self care, reaching, carrying, lifting, house/yardwork, driving, computer work.   [] (67161) Therapist is in constant attendance of 2 or more patients providing skilled therapy interventions, but not providing any significant amount of measurable one-on-one time to either patient, for improvements in  [] LE / Core stability: LE, hip, and core control in self care, mobility, lifting, ambulation and eccentric single leg control. [] UE / Shoulder complex: cervical, scapular, scapulothoracic and UE control with self care, reaching, carrying, lifting, house/yardwork, driving, computer work. NMR and Therapeutic Activities:    [x] (51689 or 16445) Provided verbal/tactile cueing for activities related to improving balance, coordination, kinesthetic sense, posture, motor skill, proprioception and motor activation to allow for proper function of   [x] LE / Core, hip and LE with self care and ADLs  [] UE / Shoulder complex: cervical, postural, scapular, scapulothoracic and UE control with self care, carrying, lifting, driving, computer work.   [] (09105) Gait Re-education- Provided training and instruction to the patient for proper LE, core and hip recruitment, positioning, and eccentric body weight control with ambulation re-education, including ascending & descending stairs     Home Management Training / Self Care:  [] (08381) Provided self-care/home management training related to activities of daily living and compensatory training, and/or use of adaptive equipment for improvement with: ADLs and compensatory training, meal preparation, safety procedures and instruction in use of adaptive equipment, including bathing, grooming, dressing, personal hygiene, basic household cleaning and chores.        Home Exercise Program:    [x] (80102) Reviewed/Progressed HEP activities related to strengthening, flexibility, endurance, ROM of   [] LE / Core stability: core, hip and LE for functional self-care, mobility, lifting and ambulation/stair navigation   [] UE / Shoulder complex: cervical, postural, scapular, scapulothoracic and UE control with self care, reaching, carrying, lifting, house/yardwork, driving, computer work  [] (57184)Reviewed/Progressed HEP activities related to improving balance, coordination, kinesthetic sense, posture, motor skill, proprioception of   [] LE: core, hip and LE for self care, mobility, lifting, and ambulation/stair navigation    [] UE / Shoulder complex: cervical, postural,  scapular, scapulothoracic and UE control with self care, reaching, carrying, lifting, house/yardwork, driving, computer work    Manual Treatments:  PROM / STM / Oscillations-Mobs:  G-I, II, III, IV (PA's, Inf., Post.)  [] (52697) Provided manual therapy to mobilize shoulder complex, hip, LE, and/or cervicothoracic/LS spine soft tissue/joints for the purpose of modulating pain, promoting relaxation,  increasing ROM, reducing/eliminating soft tissue swelling/inflammation/restriction, improving soft tissue extensibility and allowing for proper ROM for normal function with   [] LE / Core stability: self care, mobility, lifting and ambulation. [] UE / Shoulder complex: self care, reaching, carrying, lifting, house/yardwork, driving, computer work. Modalities:  [] (03692) Vasopneumatic compression: Utilized vasopneumatic compression to decrease edema / swelling for the purpose of improving mobility and quad tone / recruitment which will allow for increased overall function including but not limited to self-care, transfers, ambulation, and ascending / descending stairs.          Charges:  Timed Code Treatment Minutes: 40   Total Treatment Minutes: 40     [] EVAL - LOW (52085)   [] EVAL - MOD (53677)  [] EVAL - HIGH (72774)  [] RE-EVAL (51616)  [] TE (72848) x 1     [] Ionto  [x] NMR (36314) x  3    [] Vaso  [] Manual (99404) x      [] Ultrasound  [] TA x       [] Mech Traction (05546)  [] Gait Training x     [] ES (un) (00410):   [] Aquatic therapy x   [] Other:   [] Group:     GOALS:   Patient stated goal: improve balance  []? Progressing: []? Met: []? Not Met: []? Adjusted     Therapist goals for Patient:   Short Term Goals: To be achieved in: 2 weeks  1. Independent in HEP and progression per patient tolerance, in order to prevent re-injury. []? Progressing: []? Met: []? Not Met: []? Adjusted  2. Patient will have a decrease in pain to facilitate improvement in movement, function, and ADLs as indicated by Functional Deficits. []? Progressing: []? Met: []? Not Met: []? Adjusted     Long Term Goals: To be achieved in: 4 weeks  1. Patient to demonstrate TUG score <13 seconds to demonstrate improved fall risk to assist with reaching prior level of function. []? Progressing: []? Met: []? Not Met: []? Adjusted  2. Patient will demonstrate [facility], good scapular mobility and good hip ROM to allow for proper joint functioning as indicated by patients Functional Deficits. []? Progressing: []? Met: []? Not Met: []? Adjusted  3. Patient will demonstrate an increase in strength to good shoulder & hip complex, and core activation to allow for proper functional mobility as indicated by patients Functional Deficits. []? Progressing: []? Met: []? Not Met: []? Adjusted  4. Patient will return to functional activities including standing >15 minutes without need for assistance. []? Progressing: []? Met: []? Not Met: []? Adjusted  5. Patient to improve MADRID score to >38 to improve static balance and decrease risk for falls. []? Progressing: []? Met: []? Not Met: []? Adjusted         Overall Progression Towards Functional goals/ Treatment Progress Update:  [] Patient is progressing as expected towards functional goals listed. [] Progression is slowed due to complexities/Impairments listed. [] Progression has been slowed due to co-morbidities.   [x] Plan just implemented, too soon to assess goals progression <30days   [] Goals require adjustment due to lack of progress  [] Patient is not progressing as expected and requires additional follow up with physician  [] Other    Persisting Functional Limitations/Impairments:  []Sleeping []Sitting               [x]Standing []Transfers        [x]Walking [x]Kneeling               []Stairs []Squatting / bending   []ADLs []Reaching  [x]Lifting  []Housework  []Driving []Job related tasks  []Sports/Recreation []Other:        ASSESSMENT:  HEP given this date. Will continue to progress as tolerable. Treatment/Activity Tolerance:  [x] Patient able to complete tx [] Patient limited by fatigue  [] Patient limited by pain  [] Patient limited by other medical complications  [] Other:     Prognosis: [x] Good [] Fair  [] Poor    Patient Requires Follow-up: [x] Yes  [] No    Plan for next treatment session:Balance training, issue HEP    PLAN: See eddi PT 2x / week for 4 weeks. [x] Continue per plan of care [] Alter current plan (see comments)  [] Plan of care initiated [] Hold pending MD visit [] Discharge    Electronically signed by: Sena Vargas PT  DPT    Note: If patient does not return for scheduled/ recommended follow up visits, his note will serve as a discharge from care along with most recent update on progress.

## 2021-09-22 ENCOUNTER — HOSPITAL ENCOUNTER (OUTPATIENT)
Dept: PHYSICAL THERAPY | Age: 86
Setting detail: THERAPIES SERIES
Discharge: HOME | End: 2021-09-22
Payer: MEDICARE

## 2021-09-22 PROCEDURE — 97530 THERAPEUTIC ACTIVITIES: CPT

## 2021-09-22 PROCEDURE — 97112 NEUROMUSCULAR REEDUCATION: CPT

## 2021-09-22 NOTE — FLOWSHEET NOTE
[address] [facility] Physical Therapy  Phone: [phone]   Fax: [phone]    Physical Therapy Daily Treatment Note  Date:  2021    Patient Name:  Carolina Gandara    :  1935  MRN: [id number]  Medical/Treatment Diagnosis Information:  · Diagnosis: Ataxic gait  · Treatment Diagnosis: Impaired static and dynamic balance, proximal hip weakness, decreased standing tolerance, impaired gait  Insurance/Certification information:  PT Insurance Information: Medicare and UMR  Physician Information:  Referring Practitioner: Elo Zaldivar MD  Plan of care signed (Y/N): []  Yes [x]  No     Date of Patient follow up with Physician:      Progress Report: []  Yes  []  No     Date Range for reporting period:  Beginning  8/3/2021   Ending      Progress report due (10 Rx/or 30 days whichever is less): visit #10 or 3/3/70     Recertification due (POC duration/ or 90 days whichever is less): visit #10 or 11/3/21     Visit # Insurance Allowable Auth required? Date Range    Medical necessity []  Yes  [x]  No n/a     Latex Allergy:  [x]NO      []YES  Preferred Language for Healthcare:   [x]English       []Other:      Functional Scale/Test Evaluation 8/3/2021 30 day  60 day  Discharge   DGI 13       TUG 16.6      MADRID 29            Pain level:  0/10  Location:  n/a    SUBJECTIVE:  Pt report that his melanoma was taken off in office and it should be completely gone. Having some knee pain that is limiting him from going to the gym like he typically does.      OBJECTIVE:       RESTRICTIONS/PRECAUTIONS: Wears catheter    Interventions/Exercises:   Therapeutic Exercises (25312) Resistance / level Sets/sec Reps Notes   Nustep L 4 5 min      IB - gastroc 2x30\" B     HR/TR  10    Step ups B UE support used, pt cued to keep hands in front to avoid posterior COG                         Neuromuscular Re-ed (85591)        Static balance:  - DLS  - NBOS  - Tandem  - SLS   Airex  Airex, EC       2x30\"  2 x 30\" ea  2 x 30\" ea        2 sets EO, 2 sets EC       3 way SLR  2 10 B No UE assist for 2nd set   Cable column weighted walkouts Fwd, retro       dribbling with ambulation SBA   Ambulation with opp LE tapping   Add CDT x150'  x150'  CGA   Turf Room:  Ambulation fwd  Ambulation retro  Ambulation lat  Marching ambulation  Ambulation with cone taps (double tap)  Step up and overs (fwd and lat)  Step over (keep walking)                HEP education              Ambulation fwd Semi- tandem 25' 4    Ambulation retro  EO  EC 25'  25' 2  2           Therapeutic Activities (12972) /  Functional Tasks / Gait Training (15580)       STS:  Mod height  Low height  Mod with EC                stairs 6\" 3 steps 8 reps 9/22: CGA-Min A for all, progressing from B UE assist to no UE assist.                                              Manual Intervention (83227)                                                     Modalities:     Pt. Education:  8/3/2021 patient educated on diagnosis, prognosis and expectations for rehab, all patient questions were answered    HEP instruction:  9/15: Rhomberg EC- firm,  Tandem- firm, Normal ROSANNA with head turns (vert and horizontal)-foam.  HS stretch, prone laying for HF flexibility. HO issued. Therapeutic Exercise and NMR EXR  [x] (31276) Provided verbal/tactile cueing for activities related to strengthening, flexibility, endurance, ROM for improvements in  [x] LE / Core stability: LE, hip, and core control with self care, mobility, lifting, ambulation.   [] UE / Shoulder complex: cervical, postural, scapular, scapulothoracic and UE control with self care, reaching, carrying, lifting, house/yardwork, driving, computer work.  [] (82589) Provided verbal/tactile cueing for activities related to improving balance, coordination, kinesthetic sense, posture, motor skill, proprioception to assist with   [] LE / Core stability: LE, hip, and core control in self care, mobility, lifting, ambulation and eccentric single leg control. [] UE / Shoulder complex: cervical, scapular, scapulothoracic and UE control with self care, reaching, carrying, lifting, house/yardwork, driving, computer work.   [] (67761) Therapist is in constant attendance of 2 or more patients providing skilled therapy interventions, but not providing any significant amount of measurable one-on-one time to either patient, for improvements in  [] LE / Core stability: LE, hip, and core control in self care, mobility, lifting, ambulation and eccentric single leg control. [] UE / Shoulder complex: cervical, scapular, scapulothoracic and UE control with self care, reaching, carrying, lifting, house/yardwork, driving, computer work. NMR and Therapeutic Activities:    [x] (70569 or 49754) Provided verbal/tactile cueing for activities related to improving balance, coordination, kinesthetic sense, posture, motor skill, proprioception and motor activation to allow for proper function of   [x] LE / Core, hip and LE with self care and ADLs  [] UE / Shoulder complex: cervical, postural, scapular, scapulothoracic and UE control with self care, carrying, lifting, driving, computer work.   [] (85924) Gait Re-education- Provided training and instruction to the patient for proper LE, core and hip recruitment, positioning, and eccentric body weight control with ambulation re-education, including ascending & descending stairs     Home Management Training / Self Care:  [] (11753) Provided self-care/home management training related to activities of daily living and compensatory training, and/or use of adaptive equipment for improvement with: ADLs and compensatory training, meal preparation, safety procedures and instruction in use of adaptive equipment, including bathing, grooming, dressing, personal hygiene, basic household cleaning and chores.        Home Exercise Program:    [x] (23503) Reviewed/Progressed HEP activities related to strengthening, flexibility, endurance, ROM of   [] LE / Core stability: core, hip and LE for functional self-care, mobility, lifting and ambulation/stair navigation   [] UE / Shoulder complex: cervical, postural, scapular, scapulothoracic and UE control with self care, reaching, carrying, lifting, house/yardwork, driving, computer work  [] (44949)Reviewed/Progressed HEP activities related to improving balance, coordination, kinesthetic sense, posture, motor skill, proprioception of   [] LE: core, hip and LE for self care, mobility, lifting, and ambulation/stair navigation    [] UE / Shoulder complex: cervical, postural,  scapular, scapulothoracic and UE control with self care, reaching, carrying, lifting, house/yardwork, driving, computer work    Manual Treatments:  PROM / STM / Oscillations-Mobs:  G-I, II, III, IV (PA's, Inf., Post.)  [] (44088) Provided manual therapy to mobilize shoulder complex, hip, LE, and/or cervicothoracic/LS spine soft tissue/joints for the purpose of modulating pain, promoting relaxation,  increasing ROM, reducing/eliminating soft tissue swelling/inflammation/restriction, improving soft tissue extensibility and allowing for proper ROM for normal function with   [] LE / Core stability: self care, mobility, lifting and ambulation. [] UE / Shoulder complex: self care, reaching, carrying, lifting, house/yardwork, driving, computer work. Modalities:  [] (98691) Vasopneumatic compression: Utilized vasopneumatic compression to decrease edema / swelling for the purpose of improving mobility and quad tone / recruitment which will allow for increased overall function including but not limited to self-care, transfers, ambulation, and ascending / descending stairs.          Charges:  Timed Code Treatment Minutes: 40   Total Treatment Minutes: 40     [] EVAL - LOW (32011)   [] EVAL - MOD (63415)  [] EVAL - HIGH (11380)  [] RE-EVAL (39280)  [] TE (74016) x 1     [] Ionto  [x] NMR (54685) x  2    [] Vaso  [] Manual (92811) x [] Ultrasound  [x] TA x   1    [] [facility] Traction (71301)  [] Gait Training x     [] ES (un) (63353):   [] Aquatic therapy x   [] Other:   [] Group:     GOALS:   Patient stated goal: improve balance  []? Progressing: []? Met: []? Not Met: []? Adjusted     Therapist goals for Patient:   Short Term Goals: To be achieved in: 2 weeks  1. Independent in HEP and progression per patient tolerance, in order to prevent re-injury. []? Progressing: []? Met: []? Not Met: []? Adjusted  2. Patient will have a decrease in pain to facilitate improvement in movement, function, and ADLs as indicated by Functional Deficits. []? Progressing: []? Met: []? Not Met: []? Adjusted     Long Term Goals: To be achieved in: 4 weeks  1. Patient to demonstrate TUG score <13 seconds to demonstrate improved fall risk to assist with reaching prior level of function. []? Progressing: []? Met: []? Not Met: []? Adjusted  2. Patient will demonstrate increased AROM to [facility], good scapular mobility and good hip ROM to allow for proper joint functioning as indicated by patients Functional Deficits. []? Progressing: []? Met: []? Not Met: []? Adjusted  3. Patient will demonstrate an increase in strength to good shoulder & hip complex, and core activation to allow for proper functional mobility as indicated by patients Functional Deficits. []? Progressing: []? Met: []? Not Met: []? Adjusted  4. Patient will return to functional activities including standing >15 minutes without need for assistance. []? Progressing: []? Met: []? Not Met: []? Adjusted  5. Patient to improve MADRID score to >38 to improve static balance and decrease risk for falls. []? Progressing: []? Met: []? Not Met: []? Adjusted         Overall Progression Towards Functional goals/ Treatment Progress Update:  [] Patient is progressing as expected towards functional goals listed. [] Progression is slowed due to complexities/Impairments listed.   [] Progression has been slowed due to co-morbidities. [x] Plan just implemented, too soon to assess goals progression <30days   [] Goals require adjustment due to lack of progress  [] Patient is not progressing as expected and requires additional follow up with physician  [] Other    Persisting Functional Limitations/Impairments:  []Sleeping []Sitting               [x]Standing []Transfers        [x]Walking [x]Kneeling               []Stairs []Squatting / bending   []ADLs []Reaching  [x]Lifting  []Housework  []Driving []Job related tasks  []Sports/Recreation []Other:        ASSESSMENT:  Pt is doing well with static balance. 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 Will continue to progress as able. Treatment/Activity Tolerance:  [x] Patient able to complete tx [] Patient limited by fatigue  [] Patient limited by pain  [] Patient limited by other medical complications  [] Other:     Prognosis: [x] Good [] Fair  [] Poor    Patient Requires Follow-up: [x] Yes  [] No    Plan for next treatment session:Balance training, issue HEP    PLAN: See jasvir. PT 2x / week for 4 weeks. [x] Continue per plan of care [] Alter current plan (see comments)  [] Plan of care initiated [] Hold pending MD visit [] Discharge    Electronically signed by: Elder Varela PT  DPT    Note: If patient does not return for scheduled/ recommended follow up visits, his note will serve as a discharge from care along with most recent update on progress.

## 2021-09-29 ENCOUNTER — HOSPITAL ENCOUNTER (OUTPATIENT)
Dept: PHYSICAL THERAPY | Age: 86
Setting detail: THERAPIES SERIES
Discharge: HOME | End: 2021-09-29
Payer: MEDICARE

## 2021-09-29 PROCEDURE — 97530 THERAPEUTIC ACTIVITIES: CPT

## 2021-09-29 NOTE — DISCHARGE SUMMARY
[address] [facility] Physical Therapy  Phone: [phone]   Fax: [phone]    Physical Therapy Daily Treatment Note  Date:  2021    Patient Name:  Lawrence Montana    :  1935  MRN: [id number]  Medical/Treatment Diagnosis Information:  · Diagnosis: Ataxic gait  · Treatment Diagnosis: Impaired static and dynamic balance, proximal hip weakness, decreased standing tolerance, impaired gait  Insurance/Certification information:  PT Insurance Information: Medicare and R  Physician Information:  Referring Practitioner: Stephon Luis MD  Plan of care signed (Y/N): []  Yes [x]  No     Date of Patient follow up with Physician:      Progress Report: []  Yes  []  No     Date Range for reporting period:  Beginning  8/3/2021   Ending      Progress report due (10 Rx/or 30 days whichever is less): visit #10 or 76     Recertification due (POC duration/ or 90 days whichever is less): visit #10 or 11/3/21     Visit # Insurance Allowable Auth required? Date Range    Medical necessity []  Yes  [x]  No n/a     Latex Allergy:  [x]NO      []YES  Preferred Language for Healthcare:   [x]English       []Other:      Functional Scale/Test Evaluation 8/3/2021 30 day  60 day  Discharge   DGI 13 NA      TUG 16.6 12.09 sec     MADRID 29 44           Pain level:  0/10  Location:  n/a    SUBJECTIVE:  Pt reports that he is doing well, feels ready for d/c to HEP.       OBJECTIVE:       RESTRICTIONS/PRECAUTIONS: Wears catheter    Interventions/Exercises:   Therapeutic Exercises (90097) Resistance / level Sets/sec Reps Notes   Nustep    IB - gastroc    HR/TR    Step ups B UE support used, pt cued to keep hands in front to avoid posterior COG                         Neuromuscular Re-ed (47224)        Static balance:  - DLS  - NBOS  - Tandem  - SLS     2 sets EO, 2 sets EC       3 way SLR  No UE assist for 2nd set   Cable column weighted walkouts Fwd, retro       dribbling with ambulation SBA   Ambulation with opp LE tapping CGA   Turf Room:  Ambulation fwd  Ambulation retro  Ambulation lat  Marching ambulation  Ambulation with cone taps (double tap)  Step up and overs (fwd and lat)  Step over (keep walking)                HEP education              Ambulation fwd    Ambulation retro            Therapeutic Activities (92229) /  Functional Tasks / Gait Training (73644)       STS:  Mod height  Low height  Mod with EC                stairs 9/22: CGA-Min A for all, progressing from B UE assist to no UE assist.                  DC assessment  x40'                          Manual Intervention (72436)                                                     Modalities:     Pt. Education:  8/3/2021 patient educated on diagnosis, prognosis and expectations for rehab, all patient questions were answered    HEP instruction:  9/15: [place] EC- firm,  Tandem- firm, Normal ROSANNA with head turns (vert and horizontal)-foam.  HS stretch, prone laying for HF flexibility. HO issued. Therapeutic Exercise and NMR EXR  [x] (12317) Provided verbal/tactile cueing for activities related to strengthening, flexibility, endurance, ROM for improvements in  [x] LE / Core stability: LE, hip, and core control with self care, mobility, lifting, ambulation. [] UE / Shoulder complex: cervical, postural, scapular, scapulothoracic and UE control with self care, reaching, carrying, lifting, house/yardwork, driving, computer work.  [] (25681) Provided verbal/tactile cueing for activities related to improving balance, coordination, kinesthetic sense, posture, motor skill, proprioception to assist with   [] LE / Core stability: LE, hip, and core control in self care, mobility, lifting, ambulation and eccentric single leg control.    [] UE / Shoulder complex: cervical, scapular, scapulothoracic and UE control with self care, reaching, carrying, lifting, house/yardwork, driving, computer work.   [] (90397) Therapist is in constant attendance of 2 or more patients providing skilled therapy interventions, but not providing any significant amount of measurable one-on-one time to either patient, for improvements in  [] LE / Core stability: LE, hip, and core control in self care, mobility, lifting, ambulation and eccentric single leg control. [] UE / Shoulder complex: cervical, scapular, scapulothoracic and UE control with self care, reaching, carrying, lifting, house/yardwork, driving, computer work. NMR and Therapeutic Activities:    [x] (94906 or 47331) Provided verbal/tactile cueing for activities related to improving balance, coordination, kinesthetic sense, posture, motor skill, proprioception and motor activation to allow for proper function of   [x] LE / Core, hip and LE with self care and ADLs  [] UE / Shoulder complex: cervical, postural, scapular, scapulothoracic and UE control with self care, carrying, lifting, driving, computer work.   [] (47491) Gait Re-education- Provided training and instruction to the patient for proper LE, core and hip recruitment, positioning, and eccentric body weight control with ambulation re-education, including ascending & descending stairs     Home Management Training / Self Care:  [] (08096) Provided self-care/home management training related to activities of daily living and compensatory training, and/or use of adaptive equipment for improvement with: ADLs and compensatory training, meal preparation, safety procedures and instruction in use of adaptive equipment, including bathing, grooming, dressing, personal hygiene, basic household cleaning and chores.        Home Exercise Program:    [x] (88190) Reviewed/Progressed HEP activities related to strengthening, flexibility, endurance, ROM of   [] LE / Core stability: core, hip and LE for functional self-care, mobility, lifting and ambulation/stair navigation   [] UE / Shoulder complex: cervical, postural, scapular, scapulothoracic and UE control with self care, reaching, carrying, lifting, house/yardwork, driving, computer work  [] (41074)Reviewed/Progressed HEP activities related to improving balance, coordination, kinesthetic sense, posture, motor skill, proprioception of   [] LE: core, hip and LE for self care, mobility, lifting, and ambulation/stair navigation    [] UE / Shoulder complex: cervical, postural,  scapular, scapulothoracic and UE control with self care, reaching, carrying, lifting, house/yardwork, driving, computer work    Manual Treatments:  PROM / STM / Oscillations-Mobs:  G-I, II, III, IV (PA's, Inf., Post.)  [] (65935) Provided manual therapy to mobilize shoulder complex, hip, LE, and/or cervicothoracic/LS spine soft tissue/joints for the purpose of modulating pain, promoting relaxation,  increasing ROM, reducing/eliminating soft tissue swelling/inflammation/restriction, improving soft tissue extensibility and allowing for proper ROM for normal function with   [] LE / Core stability: self care, mobility, lifting and ambulation. [] UE / Shoulder complex: self care, reaching, carrying, lifting, house/yardwork, driving, computer work. Modalities:  [] (25245) Vasopneumatic compression: Utilized vasopneumatic compression to decrease edema / swelling for the purpose of improving mobility and quad tone / recruitment which will allow for increased overall function including but not limited to self-care, transfers, ambulation, and ascending / descending stairs. Charges:  Timed Code Treatment Minutes: 40   Total Treatment Minutes: 40     [] EVAL - LOW (88943)   [] EVAL - MOD (64512)  [] EVAL - HIGH (87394)  [] RE-EVAL (83692)  [] TE (82656) x 1     [] Ionto  [] NMR (18373) x  2    [] Vaso  [] Manual (23233) x      [] Ultrasound  [x] TA x   3    [] Mech Traction (81533)  [] Gait Training x     [] ES (un) (35961):   [] Aquatic therapy x   [] Other:   [] Group:     GOALS:   Patient stated goal: improve balance  []? Progressing: []? Met: []?  Not Met: []? physician  [] Other    Persisting Functional Limitations/Impairments:  []Sleeping []Sitting               [x]Standing []Transfers        [x]Walking [x]Kneeling               []Stairs []Squatting / bending   []ADLs []Reaching  [x]Lifting  []Housework  []Driving []Job related tasks  []Sports/Recreation []Other:        ASSESSMENT:  Pt's balance is significantly improved with therapy. TUG and MADRID both show good improvement and significant decrease in fall risk. Pt continues to struggle with prolonged static standing d/t neuropathy however PT educated on activity modification and will continue to address this with HEP. Pt is now d/c to HEP. Treatment/Activity Tolerance:  [x] Patient able to complete tx [] Patient limited by fatigue  [] Patient limited by pain  [] Patient limited by other medical complications  [] Other:     Prognosis: [x] Good [] Fair  [] Poor    Patient Requires Follow-up: [x] Yes  [] No    Plan for next treatment session:Balance training, issue HEP    PLAN: See jasvir. PT 2x / week for 4 weeks. [] Continue per plan of care [] Alter current plan (see comments)  [] Plan of care initiated [] Hold pending MD visit [x] Discharge    Electronically signed by: Sandhya Duenas PT  DPT    Note: If patient does not return for scheduled/ recommended follow up visits, his note will serve as a discharge from care along with most recent update on progress.

## 2021-10-16 ENCOUNTER — APPOINTMENT (OUTPATIENT)
Dept: GENERAL RADIOLOGY | Age: 86
DRG: 313 | End: 2021-10-16
Payer: MEDICARE

## 2021-10-16 ENCOUNTER — HOSPITAL ENCOUNTER (INPATIENT)
Age: 86
LOS: 3 days | Discharge: HOME | DRG: 313 | End: 2021-10-19
Attending: EMERGENCY MEDICINE | Admitting: INTERNAL MEDICINE
Payer: MEDICARE

## 2021-10-16 DIAGNOSIS — R07.89 CHEST DISCOMFORT: Primary | ICD-10-CM

## 2021-10-16 DIAGNOSIS — N17.9 AKI (ACUTE KIDNEY INJURY) (HCC): ICD-10-CM

## 2021-10-16 PROBLEM — R07.9 CHEST PAIN: Status: ACTIVE | Noted: 2021-10-16

## 2021-10-16 LAB
A/G RATIO: 1.3 (ref 1.1–2.2)
ALBUMIN SERPL-MCNC: 4.1 G/DL (ref 3.4–5)
ALP BLD-CCNC: 108 U/L (ref 40–129)
ALT SERPL-CCNC: 21 U/L (ref 10–40)
ANION GAP SERPL CALCULATED.3IONS-SCNC: 8 MMOL/L (ref 3–16)
AST SERPL-CCNC: 20 U/L (ref 15–37)
BASOPHILS ABSOLUTE: 0.1 K/UL (ref 0–0.2)
BASOPHILS RELATIVE PERCENT: 0.5 %
BILIRUB SERPL-MCNC: 0.4 MG/DL (ref 0–1)
BUN BLDV-MCNC: 16 MG/DL (ref 7–20)
CALCIUM SERPL-MCNC: 9.7 MG/DL (ref 8.3–10.6)
CHLORIDE BLD-SCNC: 107 MMOL/L (ref 99–110)
CO2: 25 MMOL/L (ref 21–32)
CREAT SERPL-MCNC: 1.9 MG/DL (ref 0.8–1.3)
EOSINOPHILS ABSOLUTE: 0.3 K/UL (ref 0–0.6)
EOSINOPHILS RELATIVE PERCENT: 1.9 %
GFR AFRICAN AMERICAN: 41
GFR NON-AFRICAN AMERICAN: 34
GLOBULIN: 3.1 G/DL
GLUCOSE BLD-MCNC: 94 MG/DL (ref 70–99)
HCT VFR BLD CALC: 35.8 % (ref 40.5–52.5)
HEMOGLOBIN: 11.8 G/DL (ref 13.5–17.5)
LIPASE: 53 U/L (ref 13–60)
LYMPHOCYTES ABSOLUTE: 3.4 K/UL (ref 1–5.1)
LYMPHOCYTES RELATIVE PERCENT: 23.4 %
MCH RBC QN AUTO: 31.5 PG (ref 26–34)
MCHC RBC AUTO-ENTMCNC: 33 G/DL (ref 31–36)
MCV RBC AUTO: 95.7 FL (ref 80–100)
MONOCYTES ABSOLUTE: 0.7 K/UL (ref 0–1.3)
MONOCYTES RELATIVE PERCENT: 4.6 %
NEUTROPHILS ABSOLUTE: 10.1 K/UL (ref 1.7–7.7)
NEUTROPHILS RELATIVE PERCENT: 69.6 %
PDW BLD-RTO: 14 % (ref 12.4–15.4)
PLATELET # BLD: 279 K/UL (ref 135–450)
PMV BLD AUTO: 7.9 FL (ref 5–10.5)
POTASSIUM REFLEX MAGNESIUM: 4.8 MMOL/L (ref 3.5–5.1)
PRO-BNP: 295 PG/ML (ref 0–449)
PROCALCITONIN: 0.09 NG/ML (ref 0–0.15)
RBC # BLD: 3.74 M/UL (ref 4.2–5.9)
SODIUM BLD-SCNC: 140 MMOL/L (ref 136–145)
TOTAL PROTEIN: 7.2 G/DL (ref 6.4–8.2)
TROPONIN: <0.01 NG/ML
TROPONIN: <0.01 NG/ML
WBC # BLD: 14.6 K/UL (ref 4–11)

## 2021-10-16 PROCEDURE — 84145 PROCALCITONIN (PCT): CPT

## 2021-10-16 PROCEDURE — 85025 COMPLETE CBC W/AUTO DIFF WBC: CPT

## 2021-10-16 PROCEDURE — 99285 EMERGENCY DEPT VISIT HI MDM: CPT

## 2021-10-16 PROCEDURE — 6360000002 HC RX W HCPCS: Performed by: INTERNAL MEDICINE

## 2021-10-16 PROCEDURE — 2580000003 HC RX 258: Performed by: EMERGENCY MEDICINE

## 2021-10-16 PROCEDURE — 36415 COLL VENOUS BLD VENIPUNCTURE: CPT

## 2021-10-16 PROCEDURE — 80053 COMPREHEN METABOLIC PANEL: CPT

## 2021-10-16 PROCEDURE — 83690 ASSAY OF LIPASE: CPT

## 2021-10-16 PROCEDURE — 1200000000 HC SEMI PRIVATE

## 2021-10-16 PROCEDURE — 84484 ASSAY OF TROPONIN QUANT: CPT

## 2021-10-16 PROCEDURE — 71045 X-RAY EXAM CHEST 1 VIEW: CPT

## 2021-10-16 PROCEDURE — 6370000000 HC RX 637 (ALT 250 FOR IP): Performed by: EMERGENCY MEDICINE

## 2021-10-16 PROCEDURE — 87077 CULTURE AEROBIC IDENTIFY: CPT

## 2021-10-16 PROCEDURE — 93005 ELECTROCARDIOGRAM TRACING: CPT | Performed by: EMERGENCY MEDICINE

## 2021-10-16 PROCEDURE — 2580000003 HC RX 258: Performed by: INTERNAL MEDICINE

## 2021-10-16 PROCEDURE — 87186 SC STD MICRODIL/AGAR DIL: CPT

## 2021-10-16 PROCEDURE — 87086 URINE CULTURE/COLONY COUNT: CPT

## 2021-10-16 PROCEDURE — 83880 ASSAY OF NATRIURETIC PEPTIDE: CPT

## 2021-10-16 PROCEDURE — 96374 THER/PROPH/DIAG INJ IV PUSH: CPT

## 2021-10-16 PROCEDURE — 6370000000 HC RX 637 (ALT 250 FOR IP): Performed by: INTERNAL MEDICINE

## 2021-10-16 RX ORDER — DULOXETIN HYDROCHLORIDE 30 MG/1
60 CAPSULE, DELAYED RELEASE ORAL DAILY
Status: DISCONTINUED | OUTPATIENT
Start: 2021-10-16 | End: 2021-10-16

## 2021-10-16 RX ORDER — LEVOTHYROXINE SODIUM 0.03 MG/1
25 TABLET ORAL DAILY
Status: DISCONTINUED | OUTPATIENT
Start: 2021-10-17 | End: 2021-10-19 | Stop reason: HOSPADM

## 2021-10-16 RX ORDER — PRAVASTATIN SODIUM 20 MG
20 TABLET ORAL DAILY
Status: DISCONTINUED | OUTPATIENT
Start: 2021-10-16 | End: 2021-10-19 | Stop reason: HOSPADM

## 2021-10-16 RX ORDER — SODIUM CHLORIDE 0.9 % (FLUSH) 0.9 %
5-40 SYRINGE (ML) INJECTION PRN
Status: DISCONTINUED | OUTPATIENT
Start: 2021-10-16 | End: 2021-10-19 | Stop reason: HOSPADM

## 2021-10-16 RX ORDER — ACETAMINOPHEN 325 MG/1
650 TABLET ORAL EVERY 6 HOURS PRN
Status: DISCONTINUED | OUTPATIENT
Start: 2021-10-16 | End: 2021-10-19 | Stop reason: HOSPADM

## 2021-10-16 RX ORDER — POLYETHYLENE GLYCOL 3350 17 G/17G
17 POWDER, FOR SOLUTION ORAL DAILY PRN
Status: DISCONTINUED | OUTPATIENT
Start: 2021-10-16 | End: 2021-10-19 | Stop reason: HOSPADM

## 2021-10-16 RX ORDER — ONDANSETRON 2 MG/ML
4 INJECTION INTRAMUSCULAR; INTRAVENOUS EVERY 6 HOURS PRN
Status: DISCONTINUED | OUTPATIENT
Start: 2021-10-16 | End: 2021-10-19 | Stop reason: HOSPADM

## 2021-10-16 RX ORDER — 0.9 % SODIUM CHLORIDE 0.9 %
1000 INTRAVENOUS SOLUTION INTRAVENOUS ONCE
Status: COMPLETED | OUTPATIENT
Start: 2021-10-16 | End: 2021-10-16

## 2021-10-16 RX ORDER — MIRTAZAPINE 15 MG/1
30 TABLET, FILM COATED ORAL NIGHTLY
Status: DISCONTINUED | OUTPATIENT
Start: 2021-10-16 | End: 2021-10-16 | Stop reason: ALTCHOICE

## 2021-10-16 RX ORDER — OLANZAPINE 5 MG/1
2.5 TABLET ORAL 2 TIMES DAILY
Status: DISCONTINUED | OUTPATIENT
Start: 2021-10-16 | End: 2021-10-16 | Stop reason: ALTCHOICE

## 2021-10-16 RX ORDER — ASPIRIN 81 MG/1
243 TABLET, CHEWABLE ORAL ONCE
Status: COMPLETED | OUTPATIENT
Start: 2021-10-16 | End: 2021-10-16

## 2021-10-16 RX ORDER — ACETAMINOPHEN 650 MG/1
650 SUPPOSITORY RECTAL EVERY 6 HOURS PRN
Status: DISCONTINUED | OUTPATIENT
Start: 2021-10-16 | End: 2021-10-19 | Stop reason: HOSPADM

## 2021-10-16 RX ORDER — SODIUM CHLORIDE 9 MG/ML
25 INJECTION, SOLUTION INTRAVENOUS PRN
Status: DISCONTINUED | OUTPATIENT
Start: 2021-10-16 | End: 2021-10-19 | Stop reason: HOSPADM

## 2021-10-16 RX ORDER — SODIUM CHLORIDE, SODIUM LACTATE, POTASSIUM CHLORIDE, CALCIUM CHLORIDE 600; 310; 30; 20 MG/100ML; MG/100ML; MG/100ML; MG/100ML
INJECTION, SOLUTION INTRAVENOUS CONTINUOUS
Status: DISCONTINUED | OUTPATIENT
Start: 2021-10-16 | End: 2021-10-17

## 2021-10-16 RX ORDER — ASPIRIN 81 MG/1
81 TABLET ORAL DAILY
Status: DISCONTINUED | OUTPATIENT
Start: 2021-10-17 | End: 2021-10-19 | Stop reason: HOSPADM

## 2021-10-16 RX ORDER — LANOLIN ALCOHOL/MO/W.PET/CERES
1000 CREAM (GRAM) TOPICAL EVERY EVENING
Status: DISCONTINUED | OUTPATIENT
Start: 2021-10-17 | End: 2021-10-19 | Stop reason: HOSPADM

## 2021-10-16 RX ORDER — SODIUM CHLORIDE 0.9 % (FLUSH) 0.9 %
5-40 SYRINGE (ML) INJECTION EVERY 12 HOURS SCHEDULED
Status: DISCONTINUED | OUTPATIENT
Start: 2021-10-16 | End: 2021-10-19 | Stop reason: HOSPADM

## 2021-10-16 RX ORDER — ONDANSETRON 4 MG/1
4 TABLET, ORALLY DISINTEGRATING ORAL EVERY 8 HOURS PRN
Status: DISCONTINUED | OUTPATIENT
Start: 2021-10-16 | End: 2021-10-19 | Stop reason: HOSPADM

## 2021-10-16 RX ADMIN — SODIUM CHLORIDE, POTASSIUM CHLORIDE, SODIUM LACTATE AND CALCIUM CHLORIDE: 600; 310; 30; 20 INJECTION, SOLUTION INTRAVENOUS at 18:38

## 2021-10-16 RX ADMIN — NITROGLYCERIN 1 INCH: 20 OINTMENT TOPICAL at 16:13

## 2021-10-16 RX ADMIN — SODIUM CHLORIDE 1000 ML: 9 INJECTION, SOLUTION INTRAVENOUS at 16:14

## 2021-10-16 RX ADMIN — PRAVASTATIN SODIUM 20 MG: 20 TABLET ORAL at 18:34

## 2021-10-16 RX ADMIN — Medication 1000 MG: at 16:51

## 2021-10-16 RX ADMIN — SODIUM CHLORIDE, PRESERVATIVE FREE 10 ML: 5 INJECTION INTRAVENOUS at 20:28

## 2021-10-16 RX ADMIN — ASPIRIN 81 MG 243 MG: 81 TABLET ORAL at 16:07

## 2021-10-16 RX ADMIN — ENOXAPARIN SODIUM 30 MG: 100 INJECTION SUBCUTANEOUS at 18:35

## 2021-10-16 ASSESSMENT — HEART SCORE: ECG: 0

## 2021-10-16 ASSESSMENT — PAIN SCALES - GENERAL
PAINLEVEL_OUTOF10: 0
PAINLEVEL_OUTOF10: 2

## 2021-10-16 NOTE — Clinical Note
Patient Class: Inpatient [101]   REQUIRED: Diagnosis: Chest pain [225504]   Estimated Length of Stay: Estimated stay of less than 2 midnights   Telemetry/Cardiac Monitoring Required?: Yes

## 2021-10-16 NOTE — ED PROVIDER NOTES
[facility] Emergency Department    CHIEF COMPLAINT  Chief Complaint   Patient presents with    Chest Pain     Centralized CP for several weeks; reports increased fatigue. CABG Sept 2009      HISTORY OF PRESENT ILLNESS  Clem Chaves is a 82 y.o. male  who presents to the ED complaining of intermittent but progressive central chest pain for a few weeks. He told his wife about it who advised he come in. Has h/o 4vCABG in 2009 as well as HTN and HLD. No fevers or coughing. He denies SOB at rest but complains of exertional dyspnea which is new the past few weeks. No leg swelling. No abdominal pains. No n/v/d. This pain is non-radiating, dull. Last cardiac workup was 2017. No other complaints, modifying factors or associated symptoms. I have reviewed the following from the nursing documentation.     Past Medical History:   Diagnosis Date    CAD (coronary artery disease) 2009    CABG    Diabetes insipidus ([address]     Hypercholesteremia     Hypertension      Past Surgical History:   Procedure Laterality Date    APPENDECTOMY  10-    CORONARY ARTERY BYPASS GRAFT      2009    HERNIA REPAIR      GROIN BOTH SIDES    KNEE SURGERY      RIGHT    RETINAL DETACHMENT SURGERY      BOTH    THYROID SURGERY      parathyroid tumor     Family History   Problem Relation Age of Onset    Heart Failure Mother     Parkinsonism Mother     Alzheimer's Disease Father     Heart Disease Neg Hx      Social History     Socioeconomic History    Marital status:      Spouse name: Not on file    Number of children: Not on file    Years of education: Not on file    Highest education level: Not on file   Occupational History    Not on file   Tobacco Use    Smoking status: Never Smoker    Smokeless tobacco: Never Used   Substance and Sexual Activity    Alcohol use: Yes     Comment: occassional    Drug use: No    Sexual activity: Yes     Partners: Female     Comment:    Other Topics Concern    Not on file   Social History Narrative    Not on file     Social Determinants of Health     Financial Resource Strain:     Difficulty of Paying Living Expenses:    Food Insecurity:     Worried About Running Out of Food in the Last Year:     [address] in the Last Year:    Transportation Needs:     Lack of Transportation (Medical):  Lack of Transportation (Non-Medical):    Physical Activity:     Days of Exercise per Week:     Minutes of Exercise per Session:    Stress:     Feeling of Stress :    Social Connections:     Frequency of Communication with Friends and Family:     Frequency of Social Gatherings with Friends and Family:     Attends Anabaptism Services:     Active Member of Clubs or Organizations:     Attends Club or Organization Meetings:     Marital Status:    Intimate Partner Violence:     Fear of Current or Ex-Partner:     Emotionally Abused:     Physically Abused:     Sexually Abused:      Current Facility-Administered Medications   Medication Dose Route Frequency Provider Last Rate Last Admin    nitroglycerin (NITRO-BID) 2 % ointment 1 inch  1 inch Topical Once Ronak Jacobson MD        0.9 % sodium chloride IV bolus 1,000 mL  1,000 mL IntraVENous Once Ronak Jacobson MD         Current Outpatient Medications   Medication Sig Dispense Refill    DULoxetine HCl 60 MG CSDR Take 60 mg by mouth daily      OLANZapine (ZYPREXA) 5 MG tablet Cut one 5 mg tablet in half. Take a half-tablet (2.5 mg) twice a day, once in the morning and once in the evening.  Indications: Bipolar Disorder, MOOD      mirtazapine (REMERON) 30 MG tablet Take 30 mg by mouth nightly      pravastatin (PRAVACHOL) 40 MG tablet Take 0.5 tablets by mouth daily 45 tablet 3    Cyanocobalamin (B-12) 1000 MCG CAPS Take 1,000 mcg by mouth every evening      lidocaine-prilocaine (EMLA) 2.5-2.5 % cream APPLY CREAM TOPICALLY IF NEEDED  5    polyethylene glycol (MIRALAX) powder Take 17 g by mouth every other day       levothyroxine (SYNTHROID) 25 MCG tablet Take 25 mcg by mouth Daily.  Multiple Vitamin (MULTIVITAMIN PO) Take  by mouth daily.  aspirin 81 MG EC tablet Take 81 mg by mouth daily. No Known Allergies    REVIEW OF SYSTEMS  10 systems reviewed, pertinent positives per HPI otherwise noted to be negative. PHYSICAL EXAM  /72   Pulse 68   Temp 97.9 °F (36.6 °C)   Resp 19   Wt 148 lb (67.1 kg)   SpO2 97%   BMI 21.24 kg/m²    GENERAL APPEARANCE: Awake and alert. Cooperative. No distress. HENT: Normocephalic. Atraumatic. Mucous membranes are moist.  NECK: Supple. EYES: PERRL. EOM's grossly intact. HEART/CHEST: RRR. No murmurs. No chest wall tenderness. LUNGS: Respirations unlabored. CTAB. Good air exchange. Speaking comfortably in full sentences. ABDOMEN: No tenderness. Soft. Non-distended. No masses. No organomegaly. No guarding or rebound. Normal bowel sounds throughout. MUSCULOSKELETAL: No extremity edema. Compartments soft. No deformity. No tenderness in the extremities. All extremities neurovascularly intact. SKIN: Warm and dry. No acute rashes. NEUROLOGICAL: Alert and oriented. CN's 2-12 intact. No gross facial drooping. Strength 5/5, sensation intact. 2 plus DTR's in knees bilaterally. Gait normal.  PSYCHIATRIC: Normal mood and affect. LABS  I have reviewed all labs for this visit.    Results for orders placed or performed during the hospital encounter of 10/16/21   CBC auto differential   Result Value Ref Range    WBC 14.6 (H) 4.0 - 11.0 K/uL    RBC 3.74 (L) 4.20 - 5.90 M/uL    Hemoglobin 11.8 (L) 13.5 - 17.5 g/dL    Hematocrit 35.8 (L) 40.5 - 52.5 %    MCV 95.7 80.0 - 100.0 fL    MCH 31.5 26.0 - 34.0 pg    MCHC 33.0 31.0 - 36.0 g/dL    RDW 14.0 12.4 - 15.4 %    Platelets 806 492 - 040 K/uL    MPV 7.9 5.0 - 10.5 fL    Neutrophils % 69.6 %    Lymphocytes % 23.4 %    Monocytes % 4.6 %    Eosinophils % 1.9 %    Basophils % 0.5 % Neutrophils Absolute 10.1 (H) 1.7 - 7.7 K/uL    Lymphocytes Absolute 3.4 1.0 - 5.1 K/uL    Monocytes Absolute 0.7 0.0 - 1.3 K/uL    Eosinophils Absolute 0.3 0.0 - 0.6 K/uL    Basophils Absolute 0.1 0.0 - 0.2 K/uL   Comprehensive Metabolic Panel w/ Reflex to MG   Result Value Ref Range    Sodium 140 136 - 145 mmol/L    Potassium reflex Magnesium 4.8 3.5 - 5.1 mmol/L    Chloride 107 99 - 110 mmol/L    CO2 25 21 - 32 mmol/L    Anion Gap 8 3 - 16    Glucose 94 70 - 99 mg/dL    BUN 16 7 - 20 mg/dL    CREATININE 1.9 (H) 0.8 - 1.3 mg/dL    GFR Non- 34 (A) >60    GFR  41 (A) >60    Calcium 9.7 8.3 - 10.6 mg/dL    Total Protein 7.2 6.4 - 8.2 g/dL    Albumin 4.1 3.4 - 5.0 g/dL    Albumin/Globulin Ratio 1.3 1.1 - 2.2    Total Bilirubin 0.4 0.0 - 1.0 mg/dL    Alkaline Phosphatase 108 40 - 129 U/L    ALT 21 10 - 40 U/L    AST 20 15 - 37 U/L    Globulin 3.1 Not Established g/dL   Troponin   Result Value Ref Range    Troponin <0.01 <0.01 ng/mL   Brain Natriuretic Peptide   Result Value Ref Range    Pro- 0 - 449 pg/mL   Lipase   Result Value Ref Range    Lipase 53.0 13.0 - 60.0 U/L   EKG 12 Lead   Result Value Ref Range    Ventricular Rate 68 BPM    Atrial Rate 68 BPM    P-R Interval 234 ms    QRS Duration 94 ms    Q-T Interval 436 ms    QTc Calculation (Bazett) 463 ms    P Axis 82 degrees    R Axis -20 degrees    T Axis 54 degrees    Diagnosis       Sinus rhythm with 1st degree A-V blockOtherwise normal ECG     The 12 lead EKG was interpreted by me as follows:  Rate: normal with a rate of 68  Rhythm: sinus  Axis: normal  Intervals: first degree AVB, narrow QRS, normal QTc  ST segments: no ST elevations or depressions  T waves: no abnormal inversions  Non-specific T wave changes: not present  Prior EKG comparison: EKG dated 1/16/21 is different, previous RSR' pattern not present, first deg AVB new    RADIOLOGY    XR CHEST PORTABLE    Result Date: 10/16/2021  EXAMINATION: ONE XRAY VIEW OF THE CHEST 10/16/2021 3:13 pm COMPARISON: Chest x-ray dated 2021. HISTORY: ORDERING SYSTEM PROVIDED HISTORY: cp TECHNOLOGIST PROVIDED HISTORY: Reason for exam:->cp Reason for Exam: Chest Pain (Centralized CP for several weeks; reports increased fatigue. CABG 2009) Acuity: Unknown Type of Exam: Unknown FINDINGS: HEART/MEDIASTINUM: The cardiomediastinal silhouette is within normal limits. Median sternotomy wires are again noted. PLEURA/LUNGS: The lungs are hyperinflated. There are no focal consolidations or pleural effusions. There is no appreciable pneumothorax. BONES/SOFT TISSUE: No acute abnormality. Old right rib fractures are again noted. No radiographic evidence of acute pulmonary disease. ED COURSE/MDM  Patient seen and evaluated. Old records reviewed. Labs and imaging reviewed and results discussed with patient. After initial evaluation, differential diagnostic considerations included: acute coronary syndrome, pulmonary embolism, COPD/asthma, pneumonia, musculoskeletal, reflux/PUD/gastritis, pneumothorax, CHF, thoracic aortic dissection, anxiety    The patient's ED workup was notable for concern for chest discomfort worrisome for angina with a heart score of 5. Initial troponin is negative though. EKG with first deg AVB, no ischemic changes. Chest x-ray clear. No cardiac work-up since 2017. He took one aspirin this morning, will give 3 more as well as Nitropaste and some fluids for a minimal ESTEBAN. HEART SCORE:    History: 1  EC  Patient Age: 4  *Risk factors for Atherosclerotic disease: Coronary Artery Disease  Risk Factors: 2  Troponin: 0  Heart Score Total: 5      Heart score: 5.   This falls under the following category: Score of 4-6, which indicates low/moderate risk for major adverse cardiac event and supports observation with repeated troponins and/or non-invasive testing      During the patient's ED course, the patient was given:  Medications   nitroglycerin (NITRO-BID) 2 % ointment 1 inch (has no administration in time range)   0.9 % sodium chloride IV bolus 1,000 mL (has no administration in time range)   aspirin chewable tablet 243 mg (243 mg Oral Given 10/16/21 1607)        CLINICAL IMPRESSION  1. Chest discomfort    2. ESTEBAN (acute kidney injury) ([facility] [address]        Blood pressure 124/72, pulse 68, temperature 97.9 °F (36.6 °C), resp. rate 19, weight 148 lb (67.1 kg), SpO2 97 %. DISPOSITION  Mio Pitts was admitted in fair condition. The plan is to admit to the hospital at this time under the hospitalist service. Hospitalist accepted the patient and will take over the patient's care. DISCLAIMER: This chart was created using Dragon dictation software. Efforts were made by me to ensure accuracy, however some errors may be present due to limitations of this technology and occasionally words are not transcribed correctly.        Herminia Wood MD  10/16/21 3180

## 2021-10-16 NOTE — ED NOTES
Pt report given to 3A RN. Pt transported to floor via wheelchair by floor RN with portable telemetry on throughout transport.      [address], RN  10/16/21 1061

## 2021-10-16 NOTE — H&P
HOSPITALISTS HISTORY AND PHYSICAL    10/16/2021 4:11 PM    Patient Information:  Cary Perez is a 82 y.o. male [id number]  PCP:  Neha Garcia (Tel: [phone] )    Chief complaint:    Chief Complaint   Patient presents with    Chest Pain     Centralized CP for several weeks; reports increased fatigue. CABG Sept 2009     History of Present Illness:  Jaspal Fonseca is a 82 y.o. male vein midsternal 2 out of 10 dull chest ache that does not radiate anywhere not associated with exertion not better with rest.  Not associate with shortness of breath nausea vomiting diaphoresis or palpitations. The patient said is been going on for last couple weeks has not gotten any worse is wife made him come to the ED to get checked out. The patient does have steps at home and is able to go up the steps without having any exertional dyspnea or chest pain. Patient history of CABG in 2009 the symptoms were much more intense than and radiated to his left arm. The patient had negative stress test in 2017 an echo that showed normal EF at 60% and since then has not had any cardiac work-up          REVIEW OF SYSTEMS:   Constitutional: Negative for fever,chills or night sweats  ENT: Negative for rhinorrhea, epistaxis, hoarseness, sore throat. Respiratory: Negative for shortness of breath,wheezing  Cardiovascular: see above  Gastrointestinal: Negative for nausea, vomiting, diarrhea  Genitourinary: Negative for polyuria, dysuria   Hematologic/Lymphatic: Negative for bleeding tendency, easy bruising  Musculoskeletal: Negative for myalgias and arthralgias  Neurologic: Negative for confusion,dysarthria. Skin: Negative for itching,rash  Psychiatric: Negative for depression,anxiety, agitation. Endocrine: Negative for polydipsia,polyuria,heat /cold intolerance.     Past Medical History:   has a past medical history of CAD (coronary artery disease), Diabetes insipidus ([address] Hypercholesteremia, and Hypertension. Past Surgical History:   has a past surgical history that includes Coronary artery bypass graft; Retinal detachment surgery; hernia repair; knee surgery; Appendectomy (10-); and Thyroid surgery. Medications:  No current facility-administered medications on file prior to encounter. Current Outpatient Medications on File Prior to Encounter   Medication Sig Dispense Refill    DULoxetine HCl 60 MG CSDR Take 60 mg by mouth daily      OLANZapine (ZYPREXA) 5 MG tablet Cut one 5 mg tablet in half. Take a half-tablet (2.5 mg) twice a day, once in the morning and once in the evening. Indications: Bipolar Disorder, MOOD      mirtazapine (REMERON) 30 MG tablet Take 30 mg by mouth nightly      pravastatin (PRAVACHOL) 40 MG tablet Take 0.5 tablets by mouth daily 45 tablet 3    Cyanocobalamin (B-12) 1000 MCG CAPS Take 1,000 mcg by mouth every evening      lidocaine-prilocaine (EMLA) 2.5-2.5 % cream APPLY CREAM TOPICALLY IF NEEDED  5    polyethylene glycol (MIRALAX) powder Take 17 g by mouth every other day       levothyroxine (SYNTHROID) 25 MCG tablet Take 25 mcg by mouth Daily.  Multiple Vitamin (MULTIVITAMIN PO) Take  by mouth daily.  aspirin 81 MG EC tablet Take 81 mg by mouth daily. Allergies:  No Known Allergies     Social History:  Patient Lives at home   reports that he has never smoked. He has never used smokeless tobacco. He reports current alcohol use. He reports that he does not use drugs. Family History:  family history includes Alzheimer's Disease in his father; Heart Failure in his mother; Parkinsonism in his mother. ,     Physical Exam:  /72   Pulse 68   Temp 97.9 °F (36.6 °C)   Resp 19   Wt 148 lb (67.1 kg)   SpO2 97%   BMI 21.24 kg/m²     General appearance:  Appears comfortable.  AAOx3  HEENT: atraumatic, Pupils equal, muscous membranes moist, no masses appreciated  Cardiovascular: Regular rate and rhythm no murmurs appreciated  Respiratory: CTAB no wheezing  Gastrointestinal: Abdomen soft, non-tender, BS+  EXT: no edema  Neurology: no gross focal deficts  Psychiatry: Appropriate affect. Not agitated  Skin: Warm, dry, no rashes appreciated    Labs:  CBC:   Lab Results   Component Value Date    WBC 14.6 10/16/2021    RBC 3.74 10/16/2021    HGB 11.8 10/16/2021    HCT 35.8 10/16/2021    MCV 95.7 10/16/2021    MCH 31.5 10/16/2021    MCHC 33.0 10/16/2021    RDW 14.0 10/16/2021     10/16/2021    MPV 7.9 10/16/2021     BMP:    Lab Results   Component Value Date     10/16/2021    K 4.8 10/16/2021     10/16/2021    CO2 25 10/16/2021    BUN 16 10/16/2021    CREATININE 1.9 10/16/2021    CALCIUM 9.7 10/16/2021    GFRAA 41 10/16/2021    GFRAA >60 03/16/2013    LABGLOM 34 10/16/2021    GLUCOSE 94 10/16/2021     XR CHEST PORTABLE   Final Result   No radiographic evidence of acute pulmonary disease. Recent imaging reviewed    Problem List  Active Problems:    * No active hospital problems. *  Resolved Problems:    * No resolved hospital problems. *        Assessment/Plan:   Chest pain: repeat tropoinins, cards consult    ESTEBAN on CK3: iv fluids    Leukocytosis ? UTI: rocephin fu urine culture  - repeat labs in am  - check procal      DVT prophylaxis lovenox  Code status full code          Please note that some part of this chart was generated using Dragon dictation software. Although every effort was made to ensure the accuracy of this automated transcription, some errors in transcription may have occurred inadvertently. If you may need any clarification, please do not hesitate to contact me through [facility].        Darien Foley MD    10/16/2021 4:11 PM

## 2021-10-16 NOTE — ED NOTES
Pt leg bag changed at this time to obtain clean urine sample for culture     Rose Mary Barnett RN  10/16/21 7252

## 2021-10-16 NOTE — ACP (ADVANCE CARE PLANNING)
Advanced Care Planning Note.     Purpose of Encounter: Advanced care planning in light of hospitalization  Parties In Attendance: Patient,    Decisional Capacity: Yes  Subjective: Patient  understand that this conversation is to address long term care goal  Objective: admitted to hospital with chest pain possible UTI and ESTEBAN on CKD 3  Goals of Care Determination: Patient to CPR intubation PEG tube and dialysis if required no tracheostomy or long-term ventilation  Code Status: full code  Time spent on Advanced care Plannin minutes  Advanced Care Planning Documents: documented patient's wishes, would like Wife Maryam Birmingham to make medical decisions if unable to make decisions    Abi Coppola MD  10/16/2021 4:36 PM

## 2021-10-16 NOTE — PROGRESS NOTES
Pt admitted to room 3325 from ED. Vital signs obtained. Pt on room air. Admission Dx: chest pain. Pt is standby assist with cane/1 person assist.  Pt oriented to room and updated on POC. Pt understands and has no further questions. Call light and bedside table within reach. Safety socks on and bed in lowest position with 2/4 siderails up. Bed alarm activated. Family at bedside. No further needs verbalized at this time.

## 2021-10-17 LAB
ANION GAP SERPL CALCULATED.3IONS-SCNC: 10 MMOL/L (ref 3–16)
BASOPHILS ABSOLUTE: 0 K/UL (ref 0–0.2)
BASOPHILS RELATIVE PERCENT: 0.4 %
BUN BLDV-MCNC: 14 MG/DL (ref 7–20)
CALCIUM SERPL-MCNC: 8.9 MG/DL (ref 8.3–10.6)
CHLORIDE BLD-SCNC: 116 MMOL/L (ref 99–110)
CO2: 23 MMOL/L (ref 21–32)
CREAT SERPL-MCNC: 1.5 MG/DL (ref 0.8–1.3)
EKG ATRIAL RATE: 68 BPM
EKG DIAGNOSIS: NORMAL
EKG P AXIS: 82 DEGREES
EKG P-R INTERVAL: 234 MS
EKG Q-T INTERVAL: 436 MS
EKG QRS DURATION: 94 MS
EKG QTC CALCULATION (BAZETT): 463 MS
EKG R AXIS: -20 DEGREES
EKG T AXIS: 54 DEGREES
EKG VENTRICULAR RATE: 68 BPM
EOSINOPHILS ABSOLUTE: 0.2 K/UL (ref 0–0.6)
EOSINOPHILS RELATIVE PERCENT: 2 %
GFR AFRICAN AMERICAN: 54
GFR NON-AFRICAN AMERICAN: 45
GLUCOSE BLD-MCNC: 116 MG/DL (ref 70–99)
HCT VFR BLD CALC: 32.8 % (ref 40.5–52.5)
HEMOGLOBIN: 10.8 G/DL (ref 13.5–17.5)
LYMPHOCYTES ABSOLUTE: 2.4 K/UL (ref 1–5.1)
LYMPHOCYTES RELATIVE PERCENT: 22.1 %
MCH RBC QN AUTO: 31.5 PG (ref 26–34)
MCHC RBC AUTO-ENTMCNC: 33.1 G/DL (ref 31–36)
MCV RBC AUTO: 95.3 FL (ref 80–100)
MONOCYTES ABSOLUTE: 0.6 K/UL (ref 0–1.3)
MONOCYTES RELATIVE PERCENT: 5.5 %
NEUTROPHILS ABSOLUTE: 7.5 K/UL (ref 1.7–7.7)
NEUTROPHILS RELATIVE PERCENT: 70 %
PDW BLD-RTO: 14 % (ref 12.4–15.4)
PLATELET # BLD: 242 K/UL (ref 135–450)
PMV BLD AUTO: 7.6 FL (ref 5–10.5)
POTASSIUM REFLEX MAGNESIUM: 4.2 MMOL/L (ref 3.5–5.1)
RBC # BLD: 3.44 M/UL (ref 4.2–5.9)
SODIUM BLD-SCNC: 149 MMOL/L (ref 136–145)
TROPONIN: <0.01 NG/ML
WBC # BLD: 10.8 K/UL (ref 4–11)

## 2021-10-17 PROCEDURE — 99222 1ST HOSP IP/OBS MODERATE 55: CPT | Performed by: INTERNAL MEDICINE

## 2021-10-17 PROCEDURE — 80048 BASIC METABOLIC PNL TOTAL CA: CPT

## 2021-10-17 PROCEDURE — 84484 ASSAY OF TROPONIN QUANT: CPT

## 2021-10-17 PROCEDURE — 1200000000 HC SEMI PRIVATE

## 2021-10-17 PROCEDURE — 2580000003 HC RX 258: Performed by: INTERNAL MEDICINE

## 2021-10-17 PROCEDURE — 85025 COMPLETE CBC W/AUTO DIFF WBC: CPT

## 2021-10-17 PROCEDURE — 6370000000 HC RX 637 (ALT 250 FOR IP): Performed by: INTERNAL MEDICINE

## 2021-10-17 PROCEDURE — 93010 ELECTROCARDIOGRAM REPORT: CPT | Performed by: INTERNAL MEDICINE

## 2021-10-17 PROCEDURE — 6370000000 HC RX 637 (ALT 250 FOR IP): Performed by: PHYSICIAN ASSISTANT

## 2021-10-17 PROCEDURE — 6360000002 HC RX W HCPCS: Performed by: INTERNAL MEDICINE

## 2021-10-17 RX ORDER — LITHIUM CARBONATE 300 MG/1
300 CAPSULE ORAL DAILY
Status: DISCONTINUED | OUTPATIENT
Start: 2021-10-17 | End: 2021-10-18

## 2021-10-17 RX ORDER — LAMOTRIGINE 100 MG/1
100 TABLET ORAL 2 TIMES DAILY
COMMUNITY
Start: 2021-09-21

## 2021-10-17 RX ORDER — SODIUM CHLORIDE 450 MG/100ML
INJECTION, SOLUTION INTRAVENOUS CONTINUOUS
Status: DISCONTINUED | OUTPATIENT
Start: 2021-10-17 | End: 2021-10-18

## 2021-10-17 RX ORDER — LANOLIN ALCOHOL/MO/W.PET/CERES
3 CREAM (GRAM) TOPICAL NIGHTLY PRN
Status: DISCONTINUED | OUTPATIENT
Start: 2021-10-17 | End: 2021-10-19 | Stop reason: HOSPADM

## 2021-10-17 RX ORDER — LITHIUM CARBONATE 300 MG
300 TABLET ORAL DAILY
COMMUNITY
Start: 2021-08-28

## 2021-10-17 RX ORDER — LAMOTRIGINE 100 MG/1
100 TABLET ORAL 2 TIMES DAILY
Status: DISCONTINUED | OUTPATIENT
Start: 2021-10-17 | End: 2021-10-19 | Stop reason: HOSPADM

## 2021-10-17 RX ADMIN — MELATONIN TAB 3 MG 3 MG: 3 TAB at 21:31

## 2021-10-17 RX ADMIN — SODIUM CHLORIDE: 4.5 INJECTION, SOLUTION INTRAVENOUS at 23:53

## 2021-10-17 RX ADMIN — ENOXAPARIN SODIUM 30 MG: 100 INJECTION SUBCUTANEOUS at 07:52

## 2021-10-17 RX ADMIN — ACETAMINOPHEN 650 MG: 325 TABLET ORAL at 21:30

## 2021-10-17 RX ADMIN — SODIUM CHLORIDE, POTASSIUM CHLORIDE, SODIUM LACTATE AND CALCIUM CHLORIDE: 600; 310; 30; 20 INJECTION, SOLUTION INTRAVENOUS at 06:08

## 2021-10-17 RX ADMIN — LEVOTHYROXINE SODIUM 25 MCG: 0.03 TABLET ORAL at 06:03

## 2021-10-17 RX ADMIN — LAMOTRIGINE 100 MG: 100 TABLET ORAL at 21:31

## 2021-10-17 RX ADMIN — Medication 1000 MG: at 15:48

## 2021-10-17 RX ADMIN — PRAVASTATIN SODIUM 20 MG: 20 TABLET ORAL at 07:52

## 2021-10-17 RX ADMIN — LITHIUM CARBONATE 300 MG: 300 CAPSULE, GELATIN COATED ORAL at 10:12

## 2021-10-17 RX ADMIN — CYANOCOBALAMIN TAB 1000 MCG 1000 MCG: 1000 TAB at 18:00

## 2021-10-17 RX ADMIN — LAMOTRIGINE 100 MG: 100 TABLET ORAL at 10:12

## 2021-10-17 RX ADMIN — SODIUM CHLORIDE: 4.5 INJECTION, SOLUTION INTRAVENOUS at 10:16

## 2021-10-17 RX ADMIN — ASPIRIN 81 MG: 81 TABLET, COATED ORAL at 07:52

## 2021-10-17 RX ADMIN — ACETAMINOPHEN 650 MG: 325 TABLET ORAL at 06:03

## 2021-10-17 RX ADMIN — SODIUM CHLORIDE, PRESERVATIVE FREE 10 ML: 5 INJECTION INTRAVENOUS at 21:31

## 2021-10-17 RX ADMIN — MELATONIN TAB 3 MG 3 MG: 3 TAB at 01:19

## 2021-10-17 ASSESSMENT — PAIN DESCRIPTION - PAIN TYPE: TYPE: ACUTE PAIN

## 2021-10-17 ASSESSMENT — PAIN SCALES - GENERAL
PAINLEVEL_OUTOF10: 0
PAINLEVEL_OUTOF10: 2
PAINLEVEL_OUTOF10: 3
PAINLEVEL_OUTOF10: 0
PAINLEVEL_OUTOF10: 2

## 2021-10-17 ASSESSMENT — PAIN DESCRIPTION - LOCATION: LOCATION: HEAD

## 2021-10-17 NOTE — CONSULTS
[address] [facility]  [phone]      Attending Physician: Irasema Guerra MD  Reason for Consultation/Chief Complaint: Fatigue and chest pain    Subjective   History of Present Illness:  Rasheeda Esquivel is a 82 y.o. patient who presented to the hospital with complaints of fatigue and chest pain for last few weeks, patient presented to the emergency room yesterday was admitted to the hospital due to these complaints on 10/16/2021. Since admission, patient says he feels a bit better, serial troponin levels been found to be negative. He says that his symptoms are similar to those that he experienced when he saw his GI doctor, Dr. Lisseth Leos, at [place] [address] and had noted decreased appetite, he underwent an esophagram he reports he had a dilation of his esophagus. He currently denies shortness of breath or leg swelling. Patient has a cardiac history which dates back to at least 2009, has had a history of CABG, he follows in our office with Dr. Shirley Desai, his last office visit was in Dec 2020. At that time, he was stable with regard to chronic medical conditions including hypertension, hypercholesterolemia and CAD/ASHD. It was noted that he has a history of diabetes insipidus/hyponatremia. His last ischemic evaluation was in 2017 with stress testing which was negative. Additionally, during the course of this hospital admission, troponin levels been found to be negative, he was noted to have ESTEBAN and has been treated with IV fluids with improvement in renal function. Past Medical History:   has a past medical history of CAD (coronary artery disease), Diabetes insipidus ([address] Hypercholesteremia, and Hypertension. Surgical History:   has a past surgical history that includes Coronary artery bypass graft; Retinal detachment surgery; hernia repair; knee surgery; Appendectomy (10-); and Thyroid surgery. Social History:   reports that he has never smoked.  He has never used smokeless tobacco. He reports current alcohol use. He reports that he does not use drugs. Family History:  family history includes Alzheimer's Disease in his father; Heart Failure in his mother; Parkinsonism in his mother. Home Medications:  Were reviewed and are listed in nursing record and/or below  Prior to Admission medications    Medication Sig Start Date End Date Taking? Authorizing Provider   lamoTRIgine (LAMICTAL) 100 MG tablet Take 100 mg by mouth 2 times daily 9/21/21  Yes Historical Provider, MD   lithium 300 MG tablet Take 300 mg by mouth daily 8/28/21  Yes Historical Provider, MD   pravastatin (PRAVACHOL) 40 MG tablet Take 0.5 tablets by mouth daily 1/23/20  Yes Maryjo Rai MD   Cyanocobalamin (B-12) 1000 MCG CAPS Take 1,000 mcg by mouth every evening   Yes Historical Provider, MD   lidocaine-prilocaine (EMLA) 2.5-2.5 % cream APPLY CREAM TOPICALLY IF NEEDED 9/20/18  Yes Historical Provider, MD   polyethylene glycol (MIRALAX) powder Take 17 g by mouth every other day    Yes Historical Provider, MD   levothyroxine (SYNTHROID) 25 MCG tablet Take 25 mcg by mouth Daily. Yes Historical Provider, MD   Multiple Vitamin (MULTIVITAMIN PO) Take  by mouth daily. Yes Historical Provider, MD   aspirin 81 MG EC tablet Take 81 mg by mouth daily. Yes Historical Provider, MD   DULoxetine HCl 60 MG CSDR Take 60 mg by mouth daily 8/25/20   Historical Provider, MD   OLANZapine (ZYPREXA) 5 MG tablet Cut one 5 mg tablet in half. Take a half-tablet (2.5 mg) twice a day, once in the morning and once in the evening.  Indications: Bipolar Disorder, MOOD 8/24/20   Historical Provider, MD   mirtazapine (REMERON) 30 MG tablet Take 30 mg by mouth nightly 8/24/20   Historical Provider, MD        CURRENT Medications:  melatonin tablet 3 mg, Nightly PRN  lamoTRIgine (LAMICTAL) tablet 100 mg, BID  lithium capsule 300 mg, Daily  perflutren lipid microspheres (DEFINITY) injection 1.65 mg, ONCE PRN  regadenoson (LEXISCAN) injection 0.4 mg, ONCE PRN  0.45 % sodium chloride infusion, Continuous  [START ON 10/18/2021] enoxaparin (LOVENOX) injection 40 mg, Daily  aspirin EC tablet 81 mg, Daily  vitamin B-12 (CYANOCOBALAMIN) tablet 1,000 mcg, QPM  levothyroxine (SYNTHROID) tablet 25 mcg, Daily  pravastatin (PRAVACHOL) tablet 20 mg, Daily  sodium chloride flush 0.9 % injection 5-40 mL, 2 times per day  sodium chloride flush 0.9 % injection 5-40 mL, PRN  0.9 % sodium chloride infusion, PRN  ondansetron (ZOFRAN-ODT) disintegrating tablet 4 mg, Q8H PRN   Or  ondansetron (ZOFRAN) injection 4 mg, Q6H PRN  polyethylene glycol (GLYCOLAX) packet 17 g, Daily PRN  acetaminophen (TYLENOL) tablet 650 mg, Q6H PRN   Or  acetaminophen (TYLENOL) suppository 650 mg, Q6H PRN  cefTRIAXone (ROCEPHIN) 1000 mg in sterile water 10 mL IV syringe, Q24H        Allergies:  Patient has no known allergies. Review of Systems:   A 14 point review of symptoms completed. Pertinent positives identified in the HPI, all other review of symptoms negative as below.       Objective   PHYSICAL EXAM:    Vitals:    10/17/21 0745   BP: 102/64   Pulse: 64   Resp: 16   Temp: 98 °F (36.7 °C)   SpO2: 99%    Weight: 146 lb (66.2 kg)         General Appearance:  Alert, cooperative, no distress, appears stated age, able to lie flat, speaking full sentences   Head:  Normocephalic, without obvious abnormality, atraumatic   Eyes:  PERRL, conjunctiva/corneas clear   Nose: Nares normal, no drainage or sinus tenderness   Throat: Lips, mucosa, and tongue normal   Neck: Supple, symmetrical, trachea midline, no adenopathy, thyroid: not enlarged, symmetric, no tenderness/mass/nodules,  JVD   Lungs:   Clear to auscultation bilaterally, respirations unlabored   Chest Wall:  No deformity or tenderness   Heart:  Regular rate and rhythm, S1, S2 normal, 2/6 sm   Abdomen:   Soft, non-tender, bowel sounds active all four quadrants,  no masses, no organomegaly   Extremities: Extremities normal, atraumatic, no cyanosis or edema   Pulses: 2+ and symmetric in upper extremities   Skin: Skin color, texture, turgor normal, no rashes or lesions   Pysch: Normal mood and affect   Neurologic: Normal gross motor and sensory exam.         Labs   CBC:   Lab Results   Component Value Date    WBC 10.8 10/17/2021    RBC 3.44 10/17/2021    HGB 10.8 10/17/2021    HCT 32.8 10/17/2021    MCV 95.3 10/17/2021    RDW 14.0 10/17/2021     10/17/2021     CMP:  Lab Results   Component Value Date     10/17/2021    K 4.2 10/17/2021     10/17/2021    CO2 23 10/17/2021    BUN 14 10/17/2021    CREATININE 1.5 10/17/2021    GFRAA 54 10/17/2021    GFRAA >60 03/16/2013    AGRATIO 1.3 10/16/2021    LABGLOM 45 10/17/2021    GLUCOSE 116 10/17/2021    PROT 7.2 10/16/2021    PROT 7.3 04/24/2012    PROT 7.3 04/24/2012    CALCIUM 8.9 10/17/2021    BILITOT 0.4 10/16/2021    ALKPHOS 108 10/16/2021    AST 20 10/16/2021    ALT 21 10/16/2021     PT/INR:  No results found for: PTINR  HgBA1c:  Lab Results   Component Value Date    LABA1C 5.7 (H) 11/15/2017     Lab Results   Component Value Date    CKTOTAL 58 12/30/2011    TROPONINI <0.01 10/17/2021         Cardiac Data     Last EKG: Normal sinus rhythm, first-degree AV block, baseline artifact, overall similar to prior EKG from Nov 2021    Echo:    2018     Summary   Normal left ventricle size and systolic function with an estimated ejection   fraction of 60%. No regional wall motion abnormalities are seen. There is mild concentric left ventricular hypertrophy. E/e\"= 7. Trivial mitral regurgitation. The aortic valve appears tricuspid with mild sclerosis, no stenosis. The aortic root is mildly dilated. Mild tricuspid regurgitation with RVSP estimated at 30 mmHg. Mild pulmonic regurgitation. Stress Test:    2017        Summary    Normal myocardial perfusion study.    Normal LV size and systolic function. Cath/cab                Studies:     cxr       Impression   No radiographic evidence of acute pulmonary disease.             I have reviewed labs and imaging/xray/diagnostic testing in this note. Assessment and Plan          Patient Active Problem List   Diagnosis    Mixed hyperlipidemia    Essential hypertension    Coronary artery disease due to lipid rich plaque    Hypercholesteremia    Chest pain       Chest pain, will evaluate further with echocardiogram and Lexiscan nuclear stress test    CAD/ASHD, continue aspirin, no beta-blocker due to hypotension    Hypercholesteremia, continue statin    History of esophageal obstruction, as per primary service    ESTEBAN, as per primary service    Thank you for allowing us to participate in the care of Vani Jay. Please call me with any questions [phone].     Rios Wise MD, [facility]   Interventional Cardiologist  [facility]  [phone] [facility]  [phone] [address] [place] [address]  10/17/2021 11:14 AM

## 2021-10-17 NOTE — PROGRESS NOTES
Called to updated wife on plan of care. Wife states pt no longer needs a taxi home tomorrow, a family friend will be able to come if discharge happens tomorrow.

## 2021-10-17 NOTE — PROGRESS NOTES
Spoke with pts wife. Wife told RN pt has a hx of becoming hypernatremic on pervious hospitalization that nearly caused pt to be sent to ICU. Pt is very concerned about this happening again. Also states PCP told pt to drink a minimum of 4000mL fluids a day to maintain electrolyte balance. Pt finds comfort in having fluids available at the bedside, RN has provided a pitcher of water on bedside table, calls for them to be refilled. While on phone with wife RN placed phone on speaker, reviewed plan of care and answered all questions for wife and pt. Wife notified RN that she is getting a R hip replacement tomorrow at [facility]. If pt is able to discharge while she is still hospitalized, pt will need a taxi service home. RN confirmed pt has a way to enter home. Pt and wife satisfied. Educated to call for additional needs and questions as they arise.

## 2021-10-17 NOTE — PROGRESS NOTES
[facility]    10/17/2021 9:59 AM        Name: Walden Lanes . Admitted: 10/16/2021  Primary Care Provider: Anabela Lopez (Tel: [phone])          Subjective: In bed no shortness of breath chest pain fever chills nausea vomiting    Reviewed interval ancillary notes    Current Medications  melatonin tablet 3 mg, Nightly PRN  lamoTRIgine (LAMICTAL) tablet 100 mg, BID  lithium capsule 300 mg, Daily  perflutren lipid microspheres (DEFINITY) injection 1.65 mg, ONCE PRN  regadenoson (LEXISCAN) injection 0.4 mg, ONCE PRN  0.45 % sodium chloride infusion, Continuous  aspirin EC tablet 81 mg, Daily  vitamin B-12 (CYANOCOBALAMIN) tablet 1,000 mcg, QPM  levothyroxine (SYNTHROID) tablet 25 mcg, Daily  pravastatin (PRAVACHOL) tablet 20 mg, Daily  sodium chloride flush 0.9 % injection 5-40 mL, 2 times per day  sodium chloride flush 0.9 % injection 5-40 mL, PRN  0.9 % sodium chloride infusion, PRN  ondansetron (ZOFRAN-ODT) disintegrating tablet 4 mg, Q8H PRN   Or  ondansetron (ZOFRAN) injection 4 mg, Q6H PRN  polyethylene glycol (GLYCOLAX) packet 17 g, Daily PRN  acetaminophen (TYLENOL) tablet 650 mg, Q6H PRN   Or  acetaminophen (TYLENOL) suppository 650 mg, Q6H PRN  cefTRIAXone (ROCEPHIN) 1000 mg in sterile water 10 mL IV syringe, Q24H  enoxaparin (LOVENOX) injection 30 mg, Daily        Objective:  /64   Pulse 64   Temp 98 °F (36.7 °C) (Oral)   Resp 16   Ht 5' 10\" (1.778 m)   Wt 146 lb (66.2 kg)   SpO2 99%   BMI 20.95 kg/m²     Intake/Output Summary (Last 24 hours) at 10/17/2021 0959  Last data filed at 10/17/2021 0618  Gross per 24 hour   Intake --   Output 3000 ml   Net -3000 ml      Wt Readings from Last 3 Encounters:   10/16/21 146 lb (66.2 kg)   01/16/21 155 lb (70.3 kg)   12/02/20 153 lb 9.6 oz (69.7 kg)       General appearance:  Appears comfortable.  AAOx3  HEENT: atraumatic, Pupils equal, muscous membranes moist, no masses appreciated  Cardiovascular: Regular rate and rhythm no murmurs appreciated  Respiratory: CTAB no wheezing  Gastrointestinal: Abdomen soft, non-tender, BS+  EXT: no edema  Neurology: no gross focal deficts  Psychiatry: Appropriate affect. Not agitated  Skin: Warm, dry, no rashes appreciated    Labs and Tests:  CBC:   Recent Labs     10/16/21  1501 10/17/21  0322   WBC 14.6* 10.8   HGB 11.8* 10.8*    242     BMP:    Recent Labs     10/16/21  1501 10/17/21  0322    149*   K 4.8 4.2    116*   CO2 25 23   BUN 16 14   CREATININE 1.9* 1.5*   GLUCOSE 94 116*     Hepatic:   Recent Labs     10/16/21  1501   AST 20   ALT 21   BILITOT 0.4   ALKPHOS 108     XR CHEST PORTABLE   Final Result   No radiographic evidence of acute pulmonary disease. NM Cardiac Stress Test Nuclear Imaging    (Results Pending)       Recent imaging reviewed    Problem List  Active Problems:    Chest pain  Resolved Problems:    * No resolved hospital problems. *     Assessment/Plan:   Chest pain:troponin neg stress test in am     ESTEBAN on CKd3: iv fluids improving repeat labs in am    Hypernatremia: change fluids to 1/2 ns, repeat labs     Leukocytosis ?  UTI: rocephin fu urine culture         DVT prophylaxis lovenox  Code status full code            Fernando Saenz MD   10/17/2021 9:59 AM

## 2021-10-18 LAB
ANION GAP SERPL CALCULATED.3IONS-SCNC: 7 MMOL/L (ref 3–16)
BASOPHILS ABSOLUTE: 0.1 K/UL (ref 0–0.2)
BASOPHILS RELATIVE PERCENT: 1.1 %
BUN BLDV-MCNC: 9 MG/DL (ref 7–20)
CALCIUM SERPL-MCNC: 9 MG/DL (ref 8.3–10.6)
CHLORIDE BLD-SCNC: 117 MMOL/L (ref 99–110)
CO2: 27 MMOL/L (ref 21–32)
CREAT SERPL-MCNC: 1.3 MG/DL (ref 0.8–1.3)
EOSINOPHILS ABSOLUTE: 0.3 K/UL (ref 0–0.6)
EOSINOPHILS RELATIVE PERCENT: 3.2 %
GFR AFRICAN AMERICAN: >60
GFR NON-AFRICAN AMERICAN: 53
GLUCOSE BLD-MCNC: 124 MG/DL (ref 70–99)
HCT VFR BLD CALC: 34.7 % (ref 40.5–52.5)
HEMOGLOBIN: 11.8 G/DL (ref 13.5–17.5)
LITHIUM DOSE AMOUNT: ABNORMAL
LITHIUM LEVEL: 0.4 MMOL/L (ref 0.6–1.2)
LV EF: 58 %
LV EF: 74 %
LVEF MODALITY: NORMAL
LVEF MODALITY: NORMAL
LYMPHOCYTES ABSOLUTE: 2.2 K/UL (ref 1–5.1)
LYMPHOCYTES RELATIVE PERCENT: 24.4 %
MCH RBC QN AUTO: 31.7 PG (ref 26–34)
MCHC RBC AUTO-ENTMCNC: 33.9 G/DL (ref 31–36)
MCV RBC AUTO: 93.6 FL (ref 80–100)
MONOCYTES ABSOLUTE: 0.4 K/UL (ref 0–1.3)
MONOCYTES RELATIVE PERCENT: 4.2 %
NEUTROPHILS ABSOLUTE: 6 K/UL (ref 1.7–7.7)
NEUTROPHILS RELATIVE PERCENT: 67.1 %
ORGANISM: ABNORMAL
OSMOLALITY URINE: 122 MOSM/KG (ref 390–1070)
OSMOLALITY: 309 MOSM/KG (ref 280–301)
PDW BLD-RTO: 13.7 % (ref 12.4–15.4)
PLATELET # BLD: 240 K/UL (ref 135–450)
PMV BLD AUTO: 7.8 FL (ref 5–10.5)
POTASSIUM REFLEX MAGNESIUM: 4.4 MMOL/L (ref 3.5–5.1)
RBC # BLD: 3.71 M/UL (ref 4.2–5.9)
SODIUM BLD-SCNC: 145 MMOL/L (ref 136–145)
SODIUM BLD-SCNC: 145 MMOL/L (ref 136–145)
SODIUM BLD-SCNC: 147 MMOL/L (ref 136–145)
SODIUM BLD-SCNC: 151 MMOL/L (ref 136–145)
URINE CULTURE, ROUTINE: ABNORMAL
WBC # BLD: 8.9 K/UL (ref 4–11)

## 2021-10-18 PROCEDURE — 6370000000 HC RX 637 (ALT 250 FOR IP): Performed by: PHYSICIAN ASSISTANT

## 2021-10-18 PROCEDURE — 36415 COLL VENOUS BLD VENIPUNCTURE: CPT

## 2021-10-18 PROCEDURE — 99223 1ST HOSP IP/OBS HIGH 75: CPT | Performed by: INTERNAL MEDICINE

## 2021-10-18 PROCEDURE — 1200000000 HC SEMI PRIVATE

## 2021-10-18 PROCEDURE — 80178 ASSAY OF LITHIUM: CPT

## 2021-10-18 PROCEDURE — 78452 HT MUSCLE IMAGE SPECT MULT: CPT | Performed by: INTERNAL MEDICINE

## 2021-10-18 PROCEDURE — 85025 COMPLETE CBC W/AUTO DIFF WBC: CPT

## 2021-10-18 PROCEDURE — 93306 TTE W/DOPPLER COMPLETE: CPT

## 2021-10-18 PROCEDURE — 6370000000 HC RX 637 (ALT 250 FOR IP): Performed by: INTERNAL MEDICINE

## 2021-10-18 PROCEDURE — A9502 TC99M TETROFOSMIN: HCPCS | Performed by: INTERNAL MEDICINE

## 2021-10-18 PROCEDURE — 93017 CV STRESS TEST TRACING ONLY: CPT | Performed by: INTERNAL MEDICINE

## 2021-10-18 PROCEDURE — 83930 ASSAY OF BLOOD OSMOLALITY: CPT

## 2021-10-18 PROCEDURE — 2580000003 HC RX 258: Performed by: INTERNAL MEDICINE

## 2021-10-18 PROCEDURE — 3430000000 HC RX DIAGNOSTIC RADIOPHARMACEUTICAL: Performed by: INTERNAL MEDICINE

## 2021-10-18 PROCEDURE — 84295 ASSAY OF SERUM SODIUM: CPT

## 2021-10-18 PROCEDURE — 83935 ASSAY OF URINE OSMOLALITY: CPT

## 2021-10-18 PROCEDURE — 6370000000 HC RX 637 (ALT 250 FOR IP): Performed by: PSYCHIATRY & NEUROLOGY

## 2021-10-18 PROCEDURE — 80048 BASIC METABOLIC PNL TOTAL CA: CPT

## 2021-10-18 PROCEDURE — 6360000002 HC RX W HCPCS: Performed by: INTERNAL MEDICINE

## 2021-10-18 PROCEDURE — 99221 1ST HOSP IP/OBS SF/LOW 40: CPT | Performed by: PSYCHIATRY & NEUROLOGY

## 2021-10-18 RX ORDER — AMINOPHYLLINE DIHYDRATE 25 MG/ML
100 INJECTION, SOLUTION INTRAVENOUS ONCE
Status: DISCONTINUED | OUTPATIENT
Start: 2021-10-18 | End: 2021-10-19 | Stop reason: HOSPADM

## 2021-10-18 RX ORDER — LITHIUM CARBONATE 300 MG/1
300 CAPSULE ORAL NIGHTLY
Status: DISCONTINUED | OUTPATIENT
Start: 2021-10-18 | End: 2021-10-19 | Stop reason: HOSPADM

## 2021-10-18 RX ORDER — DEXTROSE MONOHYDRATE 50 MG/ML
INJECTION, SOLUTION INTRAVENOUS CONTINUOUS
Status: DISCONTINUED | OUTPATIENT
Start: 2021-10-18 | End: 2021-10-19 | Stop reason: HOSPADM

## 2021-10-18 RX ORDER — AMOXICILLIN 250 MG/1
500 CAPSULE ORAL EVERY 8 HOURS SCHEDULED
Status: DISCONTINUED | OUTPATIENT
Start: 2021-10-18 | End: 2021-10-19 | Stop reason: HOSPADM

## 2021-10-18 RX ORDER — AMILORIDE HYDROCHLORIDE 5 MG/1
10 TABLET ORAL DAILY
Status: DISCONTINUED | OUTPATIENT
Start: 2021-10-19 | End: 2021-10-19

## 2021-10-18 RX ADMIN — TETROFOSMIN 10 MILLICURIE: 1.38 INJECTION, POWDER, LYOPHILIZED, FOR SOLUTION INTRAVENOUS at 07:51

## 2021-10-18 RX ADMIN — LAMOTRIGINE 100 MG: 100 TABLET ORAL at 20:46

## 2021-10-18 RX ADMIN — LITHIUM CARBONATE 300 MG: 300 CAPSULE, GELATIN COATED ORAL at 20:46

## 2021-10-18 RX ADMIN — REGADENOSON 0.4 MG: 0.08 INJECTION, SOLUTION INTRAVENOUS at 09:04

## 2021-10-18 RX ADMIN — ASPIRIN 81 MG: 81 TABLET, COATED ORAL at 11:37

## 2021-10-18 RX ADMIN — CYANOCOBALAMIN TAB 1000 MCG 1000 MCG: 1000 TAB at 18:17

## 2021-10-18 RX ADMIN — PRAVASTATIN SODIUM 20 MG: 20 TABLET ORAL at 11:37

## 2021-10-18 RX ADMIN — AMOXICILLIN 500 MG: 250 CAPSULE ORAL at 14:38

## 2021-10-18 RX ADMIN — LAMOTRIGINE 100 MG: 100 TABLET ORAL at 11:37

## 2021-10-18 RX ADMIN — ACETAMINOPHEN 650 MG: 325 TABLET ORAL at 06:28

## 2021-10-18 RX ADMIN — MELATONIN TAB 3 MG 3 MG: 3 TAB at 22:25

## 2021-10-18 RX ADMIN — LEVOTHYROXINE SODIUM 25 MCG: 0.03 TABLET ORAL at 05:13

## 2021-10-18 RX ADMIN — TETROFOSMIN 30 MILLICURIE: 1.38 INJECTION, POWDER, LYOPHILIZED, FOR SOLUTION INTRAVENOUS at 09:12

## 2021-10-18 RX ADMIN — AMOXICILLIN 500 MG: 250 CAPSULE ORAL at 20:46

## 2021-10-18 RX ADMIN — DEXTROSE MONOHYDRATE: 50 INJECTION, SOLUTION INTRAVENOUS at 11:42

## 2021-10-18 ASSESSMENT — PAIN SCALES - GENERAL
PAINLEVEL_OUTOF10: 0
PAINLEVEL_OUTOF10: 5
PAINLEVEL_OUTOF10: 0
PAINLEVEL_OUTOF10: 0

## 2021-10-18 NOTE — PROGRESS NOTES
Instructed on Lexiscan Stress Test Procedure including possible side effects/ adverse reactions. Patient verbalizes  understanding and denies having any questions . See [address] [place] [address] Cardiology

## 2021-10-18 NOTE — CARE COORDINATION
Discharge Planning Assessment    SW discharge planner met with patient to discuss reason for admission, current living situation, and potential needs at the time of discharge. Demographics/Insurance verified:  Yes    Current type of dwelling:  House    Living arrangements:  W/spouse     Level of function/Support:  Pt reports he uses a cane at all times. Reported spouse is supportive but is currently at the hospital [facility] getting hip surgery. Has good support from his daughters. PCP:  Alley Sloan    Last Visit to PCP:  Aug 2021    DME:  cane    Active with any community resources/agencies/skilled home care:  None. Medication compliance issues:  No    Financial issues that could impact healthcare:  No    Tentative discharge plan:  PT/OT pending. Pt declining [facility] before therapy completes their evaluation. However, pt does report being weaker than normal and his spouse just underwent a hip surgery today. Pt will most likely need assistance. Follow therapy evals then speak with pt and his dtrs regarding discharge plans as pt may need some encouragement if home services are recommended. Discussed with patient and/or family that on the day of discharge home tentative time of discharge will be between 10 AM and noon.     Transportation at the time of discharge:  Family can assist.    Electronically signed by LAURA Jordan, XAVIER on 10/18/2021 at 7:42 PM

## 2021-10-18 NOTE — PROGRESS NOTES
appreciated  Cardiovascular: Regular rate and rhythm no murmurs appreciated  Respiratory: CTAB no wheezing  Gastrointestinal: Abdomen soft, non-tender, BS+  EXT: no edema  Neurology: no gross focal deficts  Psychiatry: Appropriate affect. Not agitated  Skin: Warm, dry, no rashes appreciated    Labs and Tests:  CBC:   Recent Labs     10/16/21  1501 10/17/21  0322 10/18/21  0712   WBC 14.6* 10.8 8.9   HGB 11.8* 10.8* 11.8*    242 240     BMP:    Recent Labs     10/16/21  1501 10/17/21  0322 10/18/21  0712    149* 151*   K 4.8 4.2 4.4    116* 117*   CO2 25 23 27   BUN 16 14 9   CREATININE 1.9* 1.5* 1.3   GLUCOSE 94 116* 124*     Hepatic:   Recent Labs     10/16/21  1501   AST 20   ALT 21   BILITOT 0.4   ALKPHOS 108     XR CHEST PORTABLE   Final Result   No radiographic evidence of acute pulmonary disease. NM Cardiac Stress Test Nuclear Imaging    (Results Pending)       Recent imaging reviewed    Problem List  Active Problems:    Chest pain  Resolved Problems:    * No resolved hospital problems.  *     Assessment/Plan:   Chest pain:stresst test and echo today     ESTEBAN on CKd3: iv fluids imrproving    Hypernatremia: Patient secondary to DI stop lithium check level check urine osm  Serum osm change fluids to D5W at 100 an hour check sodiums every 4 hours nephrology consulted psych consult     enterrococus uti: start on amoxicillin              DVT prophylaxis lovenox  Code status full code            Behzad Pnieda MD   10/18/2021 10:32 AM

## 2021-10-18 NOTE — CONSULTS
Office : [phone]     Fax :[phone]       Nephrology Consult Note      Patient's Name: Marco Antonio Romero  11:49 AM  10/18/2021    Reason for Consult:  Hypernatremia     Requesting Physician:  Hali Spence      Chief Complaint:    Chief Complaint   Patient presents with    Chest Pain     Centralized CP for several weeks; reports increased fatigue. CABG Sept 2009          History of Present Ilness:    Marco Antonio Romero is a 82 y.o. male with h/o bipolar ds, CAD, CABG, HTN who was admitted with c/o chest pain. He has been on lithium for bipolar ds. Nephrology was consulted for hypernatremia. 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 He drinks plenty of fluids. He is having polyuria. Made around 7 liter urine   He is awake and alert   renal function stable         I/O last 3 completed shifts: In: 4336.8 [P.O.:2500; I.V.:1836.8]  Out: 7050 [Urine:7050]    Past Medical History:   Diagnosis Date    CAD (coronary artery disease) 2009    CABG    Diabetes insipidus ([address]     Hypercholesteremia     Hypertension        Past Surgical History:   Procedure Laterality Date    APPENDECTOMY  10-    CORONARY ARTERY BYPASS GRAFT      2009    HERNIA REPAIR      GROIN BOTH SIDES    KNEE SURGERY      RIGHT    RETINAL DETACHMENT SURGERY      BOTH    THYROID SURGERY      parathyroid tumor       Family History   Problem Relation Age of Onset    Heart Failure Mother     Parkinsonism Mother     Alzheimer's Disease Father     Heart Disease Neg Hx         reports that he has never smoked. He has never used smokeless tobacco. He reports current alcohol use. He reports that he does not use drugs. Allergies:  Patient has no known allergies.     Current Medications:    aminophylline injection 100 mg, Once  dextrose 5 % solution, Continuous  amoxicillin (AMOXIL) capsule 500 mg, 3 times per day  melatonin tablet 3 mg, Nightly PRN  lamoTRIgine (LAMICTAL) tablet 100 mg, BID  perflutren lipid microspheres (DEFINITY) injection 1.65 mg, ONCE PRN  enoxaparin (LOVENOX) injection 40 mg, Daily  aspirin EC tablet 81 mg, Daily  vitamin B-12 (CYANOCOBALAMIN) tablet 1,000 mcg, QPM  levothyroxine (SYNTHROID) tablet 25 mcg, Daily  pravastatin (PRAVACHOL) tablet 20 mg, Daily  sodium chloride flush 0.9 % injection 5-40 mL, 2 times per day  sodium chloride flush 0.9 % injection 5-40 mL, PRN  0.9 % sodium chloride infusion, PRN  ondansetron (ZOFRAN-ODT) disintegrating tablet 4 mg, Q8H PRN   Or  ondansetron (ZOFRAN) injection 4 mg, Q6H PRN  polyethylene glycol (GLYCOLAX) packet 17 g, Daily PRN  acetaminophen (TYLENOL) tablet 650 mg, Q6H PRN   Or  acetaminophen (TYLENOL) suppository 650 mg, Q6H PRN        Review of Systems:   14 point ROS obtained but were negative except mentioned in HPI      Physical exam:     Vitals:  /66   Pulse 75   Temp 97.9 °F (36.6 °C) (Oral)   Resp 16   Ht 5' 10\" (1.778 m)   Wt 146 lb (66.2 kg)   SpO2 97%   BMI 20.95 kg/m²   Constitutional:  OAA X3 NAD  Skin: no rash, turgor wnl  Heent:  eomi, mmm  Neck: no bruits or jvd noted  Cardiovascular:  S1, S2 without m/r/g  Respiratory: CTA B without w/r/r  Abdomen:  +bs, soft, nt, nd  Ext: no lower extremity edema  Psychiatric: mood and affect appropriate  Musculoskeletal:  Rom, muscular strength intact    Labs:  CBC:   Recent Labs     10/16/21  1501 10/17/21  0322 10/18/21  0712   WBC 14.6* 10.8 8.9   HGB 11.8* 10.8* 11.8*    242 240     BMP:    Recent Labs     10/16/21  1501 10/17/21  0322 10/18/21  0712    149* 151*   K 4.8 4.2 4.4    116* 117*   CO2 25 23 27   BUN 16 14 9   CREATININE 1.9* 1.5* 1.3   GLUCOSE 94 116* 124*     Ca/Mg/Phos:   Recent Labs     10/16/21  1501 10/17/21  0322 10/18/21  0712   CALCIUM 9.7 8.9 9.0 Hepatic:   Recent Labs     10/16/21  1501   AST 20   ALT 21   BILITOT 0.4   ALKPHOS 108     Troponin:   Recent Labs     10/16/21  1501 10/16/21  2050 10/17/21  0322   TROPONINI <0.01 <0.01 <0.01     BNP: No results for input(s): BNP in the last 72 hours. Lipids: No results for input(s): CHOL, TRIG, HDL, LDLCALC, LABVLDL in the last 72 hours. ABGs: No results for input(s): PHART, PO2ART, AFK4DPT in the last 72 hours. INR: No results for input(s): INR in the last 72 hours. UA:No results for input(s): Brain Douse, GLUCOSEU, BILIRUBINUR, Cervantes Nabil, BLOODU, PHUR, PROTEINU, UROBILINOGEN, NITRU, LEUKOCYTESUR, LABMICR, URINETYPE in the last 72 hours. Urine Microscopic: No results for input(s): LABCAST, BACTERIA, COMU, HYALCAST, WBCUA, RBCUA, EPIU in the last 72 hours. Urine Culture:   Recent Labs     10/16/21  1735   LABURIN 25,000 CFU/ml     Urine Chemistry: No results for input(s): CLUR, LABCREA, PROTEINUR, NAUR in the last 72 hours. IMAGING:  XR CHEST PORTABLE   Final Result   No radiographic evidence of acute pulmonary disease. 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 Will add amiloride 10 mg po daily     2. CKD 3 A     3.  CAD/ CABG  Chest pain   For stress test     Continue D5W for now   Monitor sodium level every 4 hrs         D/w primary team      Thank you for allowing us to participate in care of Vani Jay         Electronically signed by: Carlitos Morrison MD, 10/18/2021, 11:49 AM      [facility] [address]  Office : [phone]  Fax :[phone]

## 2021-10-18 NOTE — PROGRESS NOTES
[address]  Daily Progress Note    Cardio: Rafael Piety: 10/16/2021      CC: CP, CAD, HTN, CHOL  Subj: Today, doing well. Stress normal.  CP resolved at this time. Obj:  BP (!) 153/76   Pulse 74   Temp 97.4 °F (36.3 °C) (Axillary)   Resp 18   Ht 5' 10\" (1.778 m)   Wt 146 lb (66.2 kg)   SpO2 99%   BMI 20.95 kg/m²     Intake/Output Summary (Last 24 hours) at 10/18/2021 0947  Last data filed at 10/18/2021 0731  Gross per 24 hour   Intake 4336.77 ml   Output 7700 ml   Net -3363.23 ml     Gen Alert, coop, no distress Heart Rrr, no mrg   Head NC, AT, no abnorm Abd Soft, NT, ND, +BS, no mass, no OM   Eyes PERRLA, conj/corn clear Ext Ext nl, AT, no c/c/e   Nose Nares nl, no drain, NT Pulse 2+ symm ra, dp, pt   Throat Lips, mucosa, tongue nl Skin Color/tect/turg nl, no rash/lesion   Neck S/S, TM, NT, no bruit/JVD Psych Nl mood and affect   Lung cta bilat Lymph No cervical or ax LA   Ch Wall NT, no deform Neuro Nl gross M/S exam     CVMeds:  Asa 81, pravastatin 20    Lab Data: Relevant and available CV data reviewed    Plan:  *CAD/CP  Status Trop negative, EKG unchanged, Echo and stress pending  [facility]  9/09 MVD->CABG  MPI  10/17 74%, normal perfusion  TTE 1/18 60%  Plan ekg and trop ok   Stress normal  *HTN  Status elevated  Plan Goal <140  *CHOL  Status On max tolerated statin  Plan Continue statin  *ARF/Hypernatremia  Status Improved to 1.3, NA remains high  Plan Per med   Dispo pending Na correction    Time:  More than 35 minutes spent reviewing patient chart (including but not limited to notes, labs, imaging and other testing), interviewing patient and/or family members, performing a physical exam, documentation of my findings above and subsequent follow-up of ordered testing. More than 50% of that time spent face to face with patient discussing clinical condition and counseling regarding treatment plan.

## 2021-10-18 NOTE — CONSULTS
PSYCHIATRY CONSULT, INITIAL EVALUATION    Referring Provider:  Angel Felipe MD    CC/Reason for Consult: on lithium and has DI      ASSESSMENT:   81 yo M with bipolar disorder, on chronic lithium for over 40 yrs. It has been a key stabilizing agent for his mental illness and attempts to try other alternatives have failed. Doses have been lowered over time. He follows with a nephrologist and psychiatrist who have continued lithium and managed DI with addition of amiloride 5mg daily. It would be preferable to keep patient on the lithium, and he acknowledges and accepts that this could continue to cause renal problems, but he feels the benefit of the medication outweighs this even if it curtails his life expectancy. 1. Bipolar disorder type I, MRE depressed, in remission  2. Hypernatremia felt 2/2 to nephrogenic DI. RECOMMENDATIONS:   1. Would recommend resumption of home dose of amiloride 5mg daily if Dr. Ericka Wallace and/or Dr. Kimmy Smith thinks this is appropriate. It is non-formulary here, so I have asked RN to ask family to bring this in so that if it is ordered it we will have it available from his home supply. 2. I will reorder lithium 300mg qhs.   3. I will follow Dr. Ronnell Streteer suggestion and guidance on how we may be able to best manage his DI without having to get him off the lithium or if that is going to cause major issues. Dispo: does not require inpatient psychiatric treatment    Thank you for this consult, please call the psychiatry consult line for further questions. I will continue to follow. ____________________________________________________________________________    HPI:   context: Pt is an 81 yo M with hx of bipolar disorder, presented to the hospital with chest pain and ESTEBAN on CKD stage 3. He has an outpatient nephrologist and psychiatrist. D/t hypernatremia from DI, lithium was stopped and both nephrology and psychiatry consults were placed.    Pt has a hx of nephrogenic DI with lithium. This has been treated with amiloride 5mg daily. Pt reports his mood has been pretty stable prior to his hospitalization, but he has been a bit irritable here over the last day. Otherwise, he has been doing well with his current treatment. About 1 yr ago he was hospitalized for severe depression after trials to use alternative treatments to lithium d/t his kidney disease. Prior to being treated with lithium 40 yrs ago, he had several episodes of decompensation requiring psychiatric hospitalization and had experienced manic and depressive episodes. He has been fairly stable when being on lithium. Pt at this point has accepted that kidney disease may take his life, but wants to maintain a good quality of life and understands that lithium helps provide that for him. Without it, he is prone to severe depression. Timing: chronic  duration: years  severity: severe    ROS:   Gen: no fevers or chills  HEENT: no vision or hearing prob  CV: no cp, no palpitations  Resp: no dyspnea  : no dysuria  MSK: no muscle or joint pain  GI:  No n/v  Skin: no rashes  Neuro: +tremors  Endo: no weight changes    Past Psychiatric History:    Hosp: several in the 76s. Had one about 1 yr ago at    Diagnoses: bipolar disorder   Med trials: lithium. Lamotrigine was started earlier this yr.  Has had some other med trials but did not stabilize him like lithium did   Outpt: Dr. Kike Peacock for several decades   NSSI: denies   Suicide Attempts: denies    Substance Use History:   Alcohol: denies   Illicits: denies    Past Medical History:   Past Medical History:   Diagnosis Date    CAD (coronary artery disease) 2009    CABG    [address] [address]     Hypercholesteremia     Hypertension      Past Surgical History:   Procedure Laterality Date    APPENDECTOMY  10-    CORONARY ARTERY BYPASS GRAFT      2009    HERNIA REPAIR      GROIN BOTH SIDES    KNEE SURGERY      RIGHT    RETINAL DETACHMENT SURGERY BOTH    THYROID SURGERY      parathyroid tumor       Social/Developmental History:    Relationship:  over 48 yrs   Supports: wife, daughter are very supportive   Housing: lives with his wife    Family History:   Family History   Problem Relation Age of Onset    Heart Failure Mother     Parkinsonism Mother     Alzheimer's Disease Father     Heart Disease Neg Hx      Allergies:  No Known Allergies    Home Medications:   No current facility-administered medications on file prior to encounter. Current Outpatient Medications on File Prior to Encounter   Medication Sig Dispense Refill    lamoTRIgine (LAMICTAL) 100 MG tablet Take 100 mg by mouth 2 times daily      lithium 300 MG tablet Take 300 mg by mouth daily      pravastatin (PRAVACHOL) 40 MG tablet Take 0.5 tablets by mouth daily 45 tablet 3    Cyanocobalamin (B-12) 1000 MCG CAPS Take 1,000 mcg by mouth every evening      lidocaine-prilocaine (EMLA) 2.5-2.5 % cream APPLY CREAM TOPICALLY IF NEEDED  5    polyethylene glycol (MIRALAX) powder Take 17 g by mouth every other day       levothyroxine (SYNTHROID) 25 MCG tablet Take 25 mcg by mouth Daily.  Multiple Vitamin (MULTIVITAMIN PO) Take  by mouth daily.  aspirin 81 MG EC tablet Take 81 mg by mouth daily.  DULoxetine HCl 60 MG CSDR Take 60 mg by mouth daily      OLANZapine (ZYPREXA) 5 MG tablet Cut one 5 mg tablet in half. Take a half-tablet (2.5 mg) twice a day, once in the morning and once in the evening.  Indications: Bipolar Disorder, MOOD      mirtazapine (REMERON) 30 MG tablet Take 30 mg by mouth nightly         Medications:  Scheduled Meds:   aminophylline  100 mg IntraVENous Once    amoxicillin  500 mg Oral 3 times per day    lithium  300 mg Oral Nightly    lamoTRIgine  100 mg Oral BID    enoxaparin  40 mg SubCUTAneous Daily    aspirin  81 mg Oral Daily    vitamin B-12  1,000 mcg Oral QPM    levothyroxine  25 mcg Oral Daily    pravastatin  20 mg Oral Daily    sodium chloride flush  5-40 mL IntraVENous 2 times per day     PRN Meds:.melatonin, perflutren lipid microspheres, sodium chloride flush, sodium chloride, ondansetron **OR** ondansetron, polyethylene glycol, acetaminophen **OR** acetaminophen    OBJECTIVE:  Vitals:    10/18/21 0516 10/18/21 0622 10/18/21 1030 10/18/21 1530   BP: (!) 153/76  123/66 137/77   Pulse: 73 74 75 67   Resp: 18 16 16   Temp: 97.4 °F (36.3 °C)  97.9 °F (36.6 °C) 97.5 °F (36.4 °C)   TempSrc: Axillary  Oral Oral   SpO2: 99%  97% 97%   Weight:       Height:           MSE:   Appearance    alert, cooperative  Motor:  +tremor, No other abnormal movements, tics or mannerisms. Speech    spontaneous, normal rate and normal volume  Language    0 - no aphasia, normal  Mood/Affect    ok / normal affect  Thought Process    linear, goal directed and coherent  Thought Content    intact , no suicidal ideation, future oriented  Associations    logical connections  Attention/Concentration    intact  Orientation    oriented to person, place, time, and general circumstances  Memory    recent and remote memory intact  Fund of Knowledge    intact  Insight/Judgement    Good / Intact    Labs:   Recent Labs     10/16/21  1501 10/17/21  0322 10/18/21  0712   WBC 14.6* 10.8 8.9   HGB 11.8* 10.8* 11.8*   HCT 35.8* 32.8* 34.7*   MCV 95.7 95.3 93.6    242 240     Recent Labs     10/16/21  1501 10/16/21  1501 10/17/21  0322 10/18/21  0712 10/18/21  1152      < > 149* 151* 145   K 4.8  --  4.2 4.4  --      --  116* 117*  --    CO2 25  --  23 27  --    BUN 16  --  14 9  --     < > = values in this interval not displayed.      Recent Labs     10/16/21  1501   AST 20   ALT 21      Lab Results   Component Value Date    COLORU YELLOW 01/16/2021    LABPH 7.5 11/15/2017    NITRU POSITIVE 01/16/2021    GLUCOSEU Negative 01/16/2021    GLUCOSEU NEGATIVE 05/10/2012    KETUA Negative 01/16/2021    UROBILINOGEN 0.2 01/16/2021    BILIRUBINUR Negative 01/16/2021 BILIRUBINUR NEGATIVE 11/15/2017     Lab Results   Component Value Date    LABA1C 5.7 (H) 11/15/2017     Lab Results   Component Value Date     11/15/2017     Lab Results   Component Value Date    CHOL 152 2019    CHOL 131 02/15/2017    CHOL 145 2015     Lab Results   Component Value Date    TRIG 69 2019    TRIG 138 02/15/2017    TRIG 111 2015     Lab Results   Component Value Date    HDL 58 2019    HDL 48 (L) 02/15/2017    HDL 60 2015     Lab Results   Component Value Date    LDLCALC 80 2019    LDLCALC 55 02/15/2017    LDLCALC 63 2015     Lab Results   Component Value Date    LABVLDL 14 2019    LABVLDL 22 2015    LABVLDL 39 2013     No results found for: [facility]  Lab Results   Component Value Date    TSH 3.29 2015     No results found for: [id number]  Lab Results   Component Value Date    [id number] 006 2012     Lab Results   Component Value Date    FOLATE >24.00 2012       Imagin2021: CT Head  FINDINGS:   BRAIN/VENTRICLES: There is no acute intracranial hemorrhage, mass effect or   midline shift. No abnormal extra-axial fluid collection.  The gray-white   differentiation is maintained without evidence of an acute infarct. There is   prominence of the ventricles and sulci due to global parenchymal volume loss. There are nonspecific areas of hypoattenuation within the periventricular and   subcortical white matter, which likely represent chronic microvascular   ischemic change.       ORBITS: The visualized portion of the orbits demonstrate no acute abnormality.       SINUSES: The visualized paranasal sinuses and mastoid air cells demonstrate   no acute abnormality.       SOFT TISSUES/SKULL: No acute abnormality of the visualized skull or soft   tissues.           Impression   No acute intracranial abnormality.          EKG:   10/16/2021: rate 68 bpm, Sinus rhythm with 1st degree A-V block Nonspecific ST abnormality, QTc 463 ms        Sri Rincon MD   Psychiatrist

## 2021-10-19 VITALS
HEIGHT: 70 IN | DIASTOLIC BLOOD PRESSURE: 92 MMHG | TEMPERATURE: 98 F | RESPIRATION RATE: 16 BRPM | OXYGEN SATURATION: 97 % | WEIGHT: 146 LBS | SYSTOLIC BLOOD PRESSURE: 131 MMHG | BODY MASS INDEX: 20.9 KG/M2 | HEART RATE: 69 BPM

## 2021-10-19 LAB
ANION GAP SERPL CALCULATED.3IONS-SCNC: 9 MMOL/L (ref 3–16)
BASOPHILS ABSOLUTE: 0.1 K/UL (ref 0–0.2)
BASOPHILS RELATIVE PERCENT: 1.2 %
BUN BLDV-MCNC: 8 MG/DL (ref 7–20)
CALCIUM SERPL-MCNC: 9.2 MG/DL (ref 8.3–10.6)
CHLORIDE BLD-SCNC: 109 MMOL/L (ref 99–110)
CO2: 24 MMOL/L (ref 21–32)
CREAT SERPL-MCNC: 1.4 MG/DL (ref 0.8–1.3)
EOSINOPHILS ABSOLUTE: 0.3 K/UL (ref 0–0.6)
EOSINOPHILS RELATIVE PERCENT: 3.1 %
GFR AFRICAN AMERICAN: 58
GFR NON-AFRICAN AMERICAN: 48
GLUCOSE BLD-MCNC: 88 MG/DL (ref 70–99)
HCT VFR BLD CALC: 37.4 % (ref 40.5–52.5)
HEMOGLOBIN: 12.7 G/DL (ref 13.5–17.5)
LYMPHOCYTES ABSOLUTE: 3 K/UL (ref 1–5.1)
LYMPHOCYTES RELATIVE PERCENT: 27.9 %
MCH RBC QN AUTO: 32 PG (ref 26–34)
MCHC RBC AUTO-ENTMCNC: 34.1 G/DL (ref 31–36)
MCV RBC AUTO: 94 FL (ref 80–100)
MONOCYTES ABSOLUTE: 0.6 K/UL (ref 0–1.3)
MONOCYTES RELATIVE PERCENT: 5.2 %
NEUTROPHILS ABSOLUTE: 6.7 K/UL (ref 1.7–7.7)
NEUTROPHILS RELATIVE PERCENT: 62.6 %
PDW BLD-RTO: 13.8 % (ref 12.4–15.4)
PLATELET # BLD: 258 K/UL (ref 135–450)
PMV BLD AUTO: 8.1 FL (ref 5–10.5)
POTASSIUM REFLEX MAGNESIUM: 4.4 MMOL/L (ref 3.5–5.1)
RBC # BLD: 3.98 M/UL (ref 4.2–5.9)
SODIUM BLD-SCNC: 142 MMOL/L (ref 136–145)
SODIUM BLD-SCNC: 144 MMOL/L (ref 136–145)
WBC # BLD: 10.8 K/UL (ref 4–11)

## 2021-10-19 PROCEDURE — 97530 THERAPEUTIC ACTIVITIES: CPT

## 2021-10-19 PROCEDURE — 85025 COMPLETE CBC W/AUTO DIFF WBC: CPT

## 2021-10-19 PROCEDURE — 97165 OT EVAL LOW COMPLEX 30 MIN: CPT

## 2021-10-19 PROCEDURE — 6370000000 HC RX 637 (ALT 250 FOR IP): Performed by: INTERNAL MEDICINE

## 2021-10-19 PROCEDURE — 84295 ASSAY OF SERUM SODIUM: CPT

## 2021-10-19 PROCEDURE — 80048 BASIC METABOLIC PNL TOTAL CA: CPT

## 2021-10-19 PROCEDURE — 99232 SBSQ HOSP IP/OBS MODERATE 35: CPT | Performed by: INTERNAL MEDICINE

## 2021-10-19 PROCEDURE — 6360000002 HC RX W HCPCS: Performed by: INTERNAL MEDICINE

## 2021-10-19 PROCEDURE — 97116 GAIT TRAINING THERAPY: CPT

## 2021-10-19 PROCEDURE — 97535 SELF CARE MNGMENT TRAINING: CPT

## 2021-10-19 PROCEDURE — 97161 PT EVAL LOW COMPLEX 20 MIN: CPT

## 2021-10-19 PROCEDURE — 36415 COLL VENOUS BLD VENIPUNCTURE: CPT

## 2021-10-19 RX ORDER — AMILORIDE HYDROCHLORIDE 5 MG/1
10 TABLET ORAL DAILY
Qty: 30 TABLET | Refills: 0
Start: 2021-10-20

## 2021-10-19 RX ORDER — AMILORIDE HYDROCHLORIDE 5 MG/1
5 TABLET ORAL DAILY
Status: DISCONTINUED | OUTPATIENT
Start: 2021-10-20 | End: 2021-10-19 | Stop reason: HOSPADM

## 2021-10-19 RX ADMIN — AMILORIDE HYDROCHLORIDE 10 MG: 5 TABLET ORAL at 08:11

## 2021-10-19 RX ADMIN — PRAVASTATIN SODIUM 20 MG: 20 TABLET ORAL at 08:10

## 2021-10-19 RX ADMIN — AMOXICILLIN 500 MG: 250 CAPSULE ORAL at 06:20

## 2021-10-19 RX ADMIN — LAMOTRIGINE 100 MG: 100 TABLET ORAL at 08:10

## 2021-10-19 RX ADMIN — ASPIRIN 81 MG: 81 TABLET, COATED ORAL at 08:10

## 2021-10-19 RX ADMIN — LEVOTHYROXINE SODIUM 25 MCG: 0.03 TABLET ORAL at 06:20

## 2021-10-19 RX ADMIN — ENOXAPARIN SODIUM 40 MG: 40 INJECTION SUBCUTANEOUS at 09:34

## 2021-10-19 ASSESSMENT — PAIN SCALES - GENERAL
PAINLEVEL_OUTOF10: 0

## 2021-10-19 NOTE — DISCHARGE SUMMARY
Hospital Medicine Discharge Summary    Patient: Tameka Bee     Gender: male  : 1935   Age: 82 y.o. MRN: [id number]    Admitting Physician: Oscar Gomez MD  Discharge Physician: Oscar Gomez MD     Code Status: Full Code     Admit Date: 10/16/2021   Discharge Date:   10/19/21    Disposition:  Home  Time spent arranging discharge: 35 minutes    Discharge Diagnoses: Active Hospital Problems    Diagnosis Date Noted    Bipolar disorder, in full remission, most recent episode depressed ([facility] [address] [F31.76]     Chest discomfort [R07.89]     Hypernatremia [E87.0]     Diabetes insipidus, nephrogenic ([address] [N25.1]     Chest pain [R07.9] 10/16/2021         Condition at Discharge:  Stable    Hospital Course:   Admitted to hospital with chest pain troponins were negative. Stress test was negative. Echo showed normal wall motion and EF of 55%. Ascending aorta was slightly dilated at 3.8 cm 2 m. Patient did have hypernatremia upon further history patient family endorse he had diabetes insipidus was restarted on home amiloride and D5W and sodium corrected peak was 151. Patient was discharged home with follow-up with PCP    Discharge Exam:    BP (!) 131/92   Pulse 69   Temp 98 °F (36.7 °C) (Oral)   Resp 16   Ht 5' 10\" (1.778 m)   Wt 146 lb (66.2 kg)   SpO2 97%   BMI 20.95 kg/m²   General appearance:  Appears comfortable. AAOx3  HEENT: atraumatic, Pupils equal, muscous membranes moist, no masses appreciated  Cardiovascular: Regular rate and rhythm no murmurs appreciated  Respiratory: CTAB no wheezing  Gastrointestinal: Abdomen soft, non-tender, BS+  EXT: no edema  Neurology: no gross focal deficts  Psychiatry: Appropriate affect.  Not agitated  Skin: Warm, dry, no rashes appreciated    Discharge Medications:   Current Discharge Medication List      START taking these medications    Details   aMILoride (MIDAMOR) 5 MG tablet Take 2 tablets by mouth daily  Qty: 30 tablet, Refills: 0 Current Discharge Medication List        Current Discharge Medication List      CONTINUE these medications which have NOT CHANGED    Details   lamoTRIgine (LAMICTAL) 100 MG tablet Take 100 mg by mouth 2 times daily      lithium 300 MG tablet Take 300 mg by mouth daily      pravastatin (PRAVACHOL) 40 MG tablet Take 0.5 tablets by mouth daily  Qty: 45 tablet, Refills: 3    Associated Diagnoses: Hyperlipidemia, unspecified hyperlipidemia type      Cyanocobalamin (B-12) 1000 MCG CAPS Take 1,000 mcg by mouth every evening      lidocaine-prilocaine (EMLA) 2.5-2.5 % cream APPLY CREAM TOPICALLY IF NEEDED  Refills: 5      polyethylene glycol (MIRALAX) powder Take 17 g by mouth every other day       levothyroxine (SYNTHROID) 25 MCG tablet Take 25 mcg by mouth Daily. Multiple Vitamin (MULTIVITAMIN PO) Take  by mouth daily. aspirin 81 MG EC tablet Take 81 mg by mouth daily. DULoxetine HCl 60 MG CSDR Take 60 mg by mouth daily      OLANZapine (ZYPREXA) 5 MG tablet Cut one 5 mg tablet in half. Take a half-tablet (2.5 mg) twice a day, once in the morning and once in the evening. Indications: Bipolar Disorder, MOOD      mirtazapine (REMERON) 30 MG tablet Take 30 mg by mouth nightly           Current Discharge Medication List          Labs:  For convenience and continuity at follow-up the following most recent labs are provided:    Lab Results   Component Value Date    WBC 10.8 10/19/2021    HGB 12.7 10/19/2021    HCT 37.4 10/19/2021    MCV 94.0 10/19/2021     10/19/2021     10/19/2021    K 4.4 10/19/2021     10/19/2021    CO2 24 10/19/2021    BUN 8 10/19/2021    CREATININE 1.4 10/19/2021    CALCIUM 9.2 10/19/2021    PHOS 2.9 09/22/2015    BNP 55 12/30/2011    ALKPHOS 108 10/16/2021    ALT 21 10/16/2021    AST 20 10/16/2021    BILITOT 0.4 10/16/2021    BILIDIR <0.2 12/28/2019    LABALBU 4.1 10/16/2021    LDLCALC 80 12/28/2019    TRIG 69 12/28/2019     Lab Results   Component Value Date    INR 1.22 (H) 10/29/2010       Radiology:  Echo Complete    Result Date: 10/18/2021  Transthoracic Echocardiography Report (TTE)  Demographics   Patient Name       Roma Mon   Date of Study      10/18/2021        Gender              Male   Patient Number     [id number]        Date of Birth       1935   Visit Number       [id number]         Age                 82 year(s)   Accession Number   [id number]        Room Number         2557   Corporate ID       [id number]           Sonographer         Kishan Gonzalez T   Ordering Physician Jass Lake MD  Interpreting        Gareth Camarillo DO                                       Physician  Procedure Type of Study   TTE procedure:ECHOCARDIOGRAM COMPLETE 2D W DOPPLER W COLOR. Procedure Date Date: 10/18/2021 Start: 09:34 AM Study Location: [facility] - Echo Lab Technical Quality: Adequate visualization Indications:Chest pain. Patient Status: Routine Height: 70 inches Weight: 146 pounds BSA: 1.83 m2 BMI: 20.95 kg/m2 BP: 153/76 mmHg  Conclusions   Summary  -Normal left ventricle size, wall thickness, and systolic function with an  estimated ejection fraction of 55-60%. -No regional wall motion abnormalities are seen.  -Aortic valve appears sclerotic but opens adequately. No evidence of aortic  valve regurgitation.  -The ascending aorta is borderline dilated at 3.8cm.  -There is mild tricuspid regurgitation with a RVSP estimation of 23 mmHg.  -Cannot estimate PA pressure as incomplete TR jet. -Normal diastolic function.  Avg. E/e'=11.5   Signature   ------------------------------------------------------------------  Electronically signed by Gareth Camarillo DO (Interpreting  physician) on 10/18/2021 at 01:20 PM  ------------------------------------------------------------------   Findings   Left Ventricle  Normal left ventricle size, wall thickness, and systolic function with an  estimated ejection fraction of 55-60%. No regional wall motion abnormalities  are seen. Normal diastolic function. Avg. E/e'=11.5   Mitral Valve  Mitral valve is structurally normal.  Trivial mitral regurgitation. Left Atrium  The left atrium is normal in size. Aortic Valve  Aortic valve appears sclerotic but opens adequately. No evidence of aortic valve regurgitation. Aorta  The aortic root is normal in size. The ascending aorta is bordline dilated  at 3.8 cm. Right Ventricle  The right ventricle is normal in size and function. TAPSE is estimated at  2.00 cm. Tricuspid Valve  Tricuspid valve is structurally normal.  There is mild tricuspid regurgitation. Cannot estimate PA pressure as incomplete TR jet. Right Atrium  The right atrial size is normal.   Pulmonic Valve  The pulmonic valve is structurally normal.  No evidence of pulmonic valve stenosis. No evidence of pulmonic valve regurgitation. Pericardial Effusion  No pericardial effusion noted. Pleural Effusion  No pleural effusion. Miscellaneous  The inferior vena cava appears normal in size with normal respiratory  variation.   M-Mode/2D Measurements (cm)   LV Diastolic Dimension: 4.50 cm LV Systolic Dimension: 2.88 cm  LV Septum Diastolic: 7.84 cm  LV PW Diastolic: 4.03 cm        AO Root Dimension: 3.6 cm                                  LA Dimension: 3.4 cm                                  LA Area: 10.8 cm2  LVOT: 2 cm                      LA volume/Index: 24.7 ml /13 ml/m2  Doppler Measurements                                  MV Peak E-Wave: 78.8 cm/s  LVOT Peak Velocity: 131 cm/s   MV Peak A-Wave: 106 cm/s                                 MV E/A Ratio: 0.74  TR Velocity:223 cm/s  TR Gradient:19.89 mmHg  Estimated RAP:3 mmHg  Estimated RVSP: 23 mmHg  E' Septal Velocity: 5.87 cm/s  E' Lateral Velocity: 8.27 cm/s  Aorta   Aortic Root: 3.6 cm  Ascending Aorta: 3.8 cm  LVOT Diameter: 2 cm      XR CHEST PORTABLE    Result Date: 10/16/2021  EXAMINATION: ONE XRAY VIEW OF THE CHEST 10/16/2021 3:13 pm COMPARISON: Chest x-ray dated 01/16/2021. HISTORY: ORDERING SYSTEM PROVIDED HISTORY: cp TECHNOLOGIST PROVIDED HISTORY: Reason for exam:->cp Reason for Exam: Chest Pain (Centralized CP for several weeks; reports increased fatigue. CABG Sept 2009) Acuity: Unknown Type of Exam: Unknown FINDINGS: HEART/MEDIASTINUM: The cardiomediastinal silhouette is within normal limits. Median sternotomy wires are again noted. PLEURA/LUNGS: The lungs are hyperinflated. There are no focal consolidations or pleural effusions. There is no appreciable pneumothorax. BONES/SOFT TISSUE: No acute abnormality. Old right rib fractures are again noted. No radiographic evidence of acute pulmonary disease. NM Cardiac Stress Test Nuclear Imaging    Result Date: 10/18/2021  Cardiac Perfusion Imaging  Demographics   Patient Name      Luzmaria Balderas   Date of Study     10/18/2021         Gender              Male   Patient Number    [id number]         Date of Birth       1935   Visit Number      [id number]          Age                 82 year(s)   Accession Number  [id number]         Room Number         3060   Corporate ID      [id number]            NM Technician       Yarelis Torres, ARRT   Nurse             Doyne Members, RN  Interpreting        Esperanza Ferguson, DO                                       Physician   Ordering          Tere Gray MD  Physician   The procedure was explained in detail to the patient. Risks,  complications and alternative treatments were reviewed. Written consent  was obtained. Procedure Procedure Type:   Nuclear Stress Test:Pharmacological, NM MYOCARDIAL SPECT REST EXERCISE OR  RX   Study location: [facility] - Nuclear Medicine   Indications: Chest pain. Hospital Status: Inpatient.   Height: 70 inches Weight: 146 pounds  Risk Factors   The patient risk factors include:prior CABG;treated and controlled  hypercholesterolemia and treated and controlled hypertension. Conclusions   Summary  The overall quality of the study is good. There is subdiaphragmatic  attenuation. Left ventricular cavity size is normal at 81 ml. The right ventricular size  is normal.   SPECT images demonstrate homogeneous tracer distribution throughout the  myocardium. There is normal isotope uptake at stress and rest. There is no  evidence of myocardial ischemia or scar. Sum stress score of 0. No visual TID. Calculated TID is 1. 13. Left ventricular ejection fraction is normal at 74%. Myocardial perfusion is normal. Low risk scan. Stress Protocols   Resting ECG  Sinus rhythm. Possible anteroseptal MI. Resting HR:71 bpm   Resting BP:143/62 mmHg  Stress Protocol:Pharmacologic - Lexiscan's  Peak HR:89 bpm                             HR/BP product:09479  Peak BP:135/67 mmHg  Predicted HR: 137 bpm  % of predicted HR: 65  Test duration: 4 min  Reason for termination:Completed   ECG Findings  Sinus rhythm. Arrhythmias  No significant rhythm abnormality. Symptoms  Developed symptoms likely related to [address] [place] [address]. There was stress induced nausea. Symptoms resolved with aminophylline. Complications  Procedure complication was none. Stress Interpretation  Non-diagnostic EKG response due to failure to reach target heart rate. Less than 0.5 mm st deviation with lexiscan. Procedure Medications   - Lexiscan I.V. 0.4 mg.10 sec   - Aminophylline I.V. 100 mg. Imaging Protocols   - One Day   Rest                          Stress   Isotope:Myoview/Tetrofosmin   Isotope: Myoview/Tetrofosmin  Isotope dose:9.7 mCi          Isotope dose:31.5 mCi  Administration Route:I.V.      Administration Route:I.V.  Date:10/18/2021 07:50         Date:10/18/2021 09:15                                 Technique:      Gated  Imaging Results    Stress ejection    Ejection fraction:74 %    EDV :81 ml    ESV :21 ml    Stroke volume :60 ml    LV mass :128 gr  Medical History   Additional Medical History has a past medical history of CAD (coronary artery disease),  Diabetes insipidus, Hypercholesteremia, and Hypertension. Surgical History:  has a past surgical history that includes Coronary artery bypass  graft; Retinal detachment surgery; hernia repair; knee surgery; Appendectomy (10-); and Thyroid surgery.    Signatures   ------------------------------------------------------------------  Electronically signed by Jeanmarie Breaux DO (Interpreting  physician) on 10/18/2021 at 13:03  ------------------------------------------------------------------          Signed:    Tressie Blizzard, MD   10/19/2021

## 2021-10-19 NOTE — PROGRESS NOTES
Updated pt's wife on plan of care. All questions answered. Pt's wife denies further questions at this time. Wife asked for another call later in the day. RN  Wife satisfied.

## 2021-10-19 NOTE — PROGRESS NOTES
Office : [phone]     Fax :[phone]       Nephrology progress Note      Patient's Name: Vani Jay  7:08 AM  10/19/2021    Reason for Consult:  Hypernatremia     Requesting Physician:  Charley Waters      Chief Complaint:    Chief Complaint   Patient presents with    Chest Pain     Centralized CP for several weeks; reports increased fatigue. CABG Sept 2009          History of Present Ilness:    Vani Jay is a 82 y.o. male with h/o bipolar ds, CAD, CABG, HTN who was admitted with c/o chest pain. He has been on lithium for bipolar ds. Nephrology was consulted for hypernatremia. 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 He drinks plenty of fluids. He is having polyuria. Made around 7 liter urine   He is awake and alert   renal function stable     Interval history. Sodium level improved with D5 water. Urine output overnight around 7 L    Nausea, no vomiting, no fever    I/O last 3 completed shifts:   In: 4039.6 [P.O.:1560; I.V.:2479.6]  Out: 7150 [Urine:7150]    Past Medical History:   Diagnosis Date    CAD (coronary artery disease) 2009    CABG    Diabetes insipidus ([address]     Hypercholesteremia     Hypertension        Past Surgical History:   Procedure Laterality Date    APPENDECTOMY  10-    CORONARY ARTERY BYPASS GRAFT      2009    HERNIA REPAIR      GROIN BOTH SIDES    KNEE SURGERY      RIGHT    RETINAL DETACHMENT SURGERY      BOTH    THYROID SURGERY      parathyroid tumor       Current Medications:    aminophylline injection 100 mg, Once  dextrose 5 % solution, Continuous  amoxicillin (AMOXIL) capsule 500 mg, 3 times per day  lithium capsule 300 mg, Nightly  aMILoride (MIDAMOR) tablet 10 mg - Patient Supplied, Daily  melatonin tablet 3 mg, Nightly PRN  lamoTRIgine (LAMICTAL) tablet 100 mg, BID  perflutren lipid microspheres (DEFINITY) injection 1.65 mg, ONCE PRN  enoxaparin (LOVENOX) injection 40 mg, Daily  aspirin EC tablet 81 mg, Daily  vitamin B-12 (CYANOCOBALAMIN) tablet 1,000 mcg, QPM  levothyroxine (SYNTHROID) tablet 25 mcg, Daily  pravastatin (PRAVACHOL) tablet 20 mg, Daily  sodium chloride flush 0.9 % injection 5-40 mL, 2 times per day  sodium chloride flush 0.9 % injection 5-40 mL, PRN  0.9 % sodium chloride infusion, PRN  ondansetron (ZOFRAN-ODT) disintegrating tablet 4 mg, Q8H PRN   Or  ondansetron (ZOFRAN) injection 4 mg, Q6H PRN  polyethylene glycol (GLYCOLAX) packet 17 g, Daily PRN  acetaminophen (TYLENOL) tablet 650 mg, Q6H PRN   Or  acetaminophen (TYLENOL) suppository 650 mg, Q6H PRN          Physical exam:     Vitals:  BP (!) 152/80   Pulse 71   Temp 97.5 °F (36.4 °C) (Oral)   Resp 19   Ht 5' 10\" (1.778 m)   Wt 146 lb (66.2 kg)   SpO2 98%   BMI 20.95 kg/m²   Constitutional:  OAA X3 NAD  Skin: no rash, turgor wnl  Heent:  eomi, mmm  Neck: no bruits or jvd noted  Cardiovascular:  S1, S2 without m/r/g  Respiratory: CTA B without w/r/r  Abdomen:  +bs, soft, nt, nd  Ext: no lower extremity edema  Psychiatric: mood and affect appropriate  Musculoskeletal:  Rom, muscular strength intact    Labs:  CBC:   Recent Labs     10/17/21  0322 10/18/21  0712 10/19/21  0416   WBC 10.8 8.9 10.8   HGB 10.8* 11.8* 12.7*    240 258     BMP:    Recent Labs     10/17/21  0322 10/17/21  0322 10/18/21  0712 10/18/21  1152 10/18/21  2035 10/19/21  0002 10/19/21  0416   *   < > 151*   < > 145 142 144  142   K 4.2  --  4.4  --   --   --  4.4   *  --  117*  --   --   --  109   CO2 23  --  27  --   --   --  24   BUN 14  --  9  --   --   --  8   CREATININE 1.5*  --  1.3  --   --   --  1.4*   GLUCOSE 116*  --  124*  --   --   --  88    < > = values in this interval not displayed.      Ca/Mg/Phos:   Recent Labs     10/17/21  0322 10/18/21  4410 10/19/21  0416 CALCIUM 8.9 9.0 9.2     Hepatic:   Recent Labs     10/16/21  1501   AST 20   ALT 21   BILITOT 0.4   ALKPHOS 108     Troponin:   Recent Labs     10/16/21  1501 10/16/21  2050 10/17/21  0322   TROPONINI <0.01 <0.01 <0.01     BNP: No results for input(s): BNP in the last 72 hours. Lipids: No results for input(s): CHOL, TRIG, HDL, LDLCALC, LABVLDL in the last 72 hours. ABGs: No results for input(s): PHART, PO2ART, JMS3UNJ in the last 72 hours. INR: No results for input(s): INR in the last 72 hours. UA:No results for input(s): Jenise Cater, GLUCOSEU, BILIRUBINUR, Mabelene Dally, BLOODU, PHUR, PROTEINU, UROBILINOGEN, NITRU, LEUKOCYTESUR, LABMICR, URINETYPE in the last 72 hours. Urine Microscopic: No results for input(s): LABCAST, BACTERIA, COMU, HYALCAST, WBCUA, RBCUA, EPIU in the last 72 hours. Urine Culture:   Recent Labs     10/16/21  1735   LABURIN 25,000 CFU/ml     Urine Chemistry: No results for input(s): CLUR, LABCREA, PROTEINUR, NAUR in the last 72 hours. IMAGING:  NM Cardiac Stress Test Nuclear Imaging   Final Result      XR CHEST PORTABLE   Final Result   No radiographic evidence of acute pulmonary disease. 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 Added amiloride 10 mg po daily. He was taking amiloride 5 mg p.o. daily. He wants to continue with 5 mg for now so we will change to 5 mg p.o. daily and he can follow-up with his nephrologist at [facility] for dose adjustment    2. CKD 3 A     3.  CAD/ CABG  Chest pain   For stress test     Stable from nephrology standpoint        D/w primary team      Thank you for allowing us to participate in care of Loyd Tomlin         Electronically signed by: Riddhi Vega MD, 10/19/2021, 7:08 AM      [facility] [address]  Office : [phone]  Fax :[phone]

## 2021-10-19 NOTE — PROGRESS NOTES
Occupational Therapy   Occupational Therapy Initial Assessment/summary  Date: 10/19/2021   Patient Name: Rasheeda Esquivel  MRN: [id number]     : 1935    Date of Service: 10/19/2021    Discharge Recommendations:Sergio Yancey scored a 18/24 on the AM-PAC ADL Inpatient form. Current research shows that an AM-PAC score of 18 or greater is typically associated with a discharge to the patient's home setting. Based on the patient's AM-PAC score, and their current ADL deficits, it is recommended that the patient have 2-3 sessions per week of Occupational Therapy at d/c to increase the patient's independence. At this time, this patient demonstrates the endurance and safety to discharge home with home (home vs OP services) and a follow up treatment frequency of 2-3x/wk. Please see assessment section for further patient specific details. If patient discharges prior to next session this note will serve as a discharge summary. Please see below for the latest assessment towards goals. HOME HEALTH CARE: LEVEL 3 SAFETY     - Initial home health evaluation to occur within 24-48 hours, in patient home   - Therapy evaluations in home within 24-48 hours of discharge; including DME and home safety   - Frontload therapy 5 days, then 3x a week   - Therapy to evaluate if patient has [address] needs for personal care   -  evaluation within 24-48 hours, includes evaluation of resources and insurance to determine AL, [place], LTC, and Medicaid options        OT Equipment Recommendations  Equipment Needed: No    Assessment   Treatment Diagnosis: chest discomfort  Prognosis: Good  Decision Making: Low Complexity  OT Education: OT Role;Plan of Care  Patient Education: Eval, discharge recommendations-patient verbalized understanding  REQUIRES OT FOLLOW UP: No  Activity Tolerance  Activity Tolerance: Patient Tolerated treatment well  Safety Devices  Safety Devices in place: Yes  Type of devices:  All fall risk precautions in place;Call light within reach; Chair alarm in place;Nurse notified; Left in chair;Gait belt;Patient at risk for falls           Patient Diagnosis(es): The primary encounter diagnosis was Chest discomfort. A diagnosis of ESTEBAN (acute kidney injury) ([facility] [address] was also pertinent to this visit. has a past medical history of CAD (coronary artery disease), Diabetes insipidus ([facility] Hypercholesteremia, and Hypertension. has a past surgical history that includes Coronary artery bypass graft; Retinal detachment surgery; hernia repair; knee surgery; Appendectomy (10-); and Thyroid surgery. Treatment Diagnosis: chest discomfort      Restrictions  Restrictions/Precautions  Restrictions/Precautions: Fall Risk (High fall risk)  Required Braces or Orthoses?: No  Position Activity Restriction  Other position/activity restrictions: 82 y.o. male vein midsternal 2 out of 10 dull chest ache that does not radiate anywhere not associated with exertion not better with rest.  Not associate with shortness of breath nausea vomiting diaphoresis or palpitations. The patient said is been going on for last couple weeks has not gotten any worse is wife made him come to the ED to get checked out. The patient does have steps at home and is able to go up the steps without having any exertional dyspnea or chest pain. Patient history of CABG in 2009 the symptoms were much more intense than and radiated to his left arm.   The patient had negative stress test in 2017 an echo that showed normal EF at 60% and since then has not had any cardiac work-up    Subjective   General  Chart Reviewed: Yes  Patient assessed for rehabilitation services?: Yes  Family / Caregiver Present: Yes  Diagnosis: Chest Discomfort  Subjective  Subjective: Patient supine in bed, agreeable to evaluation  Patient Currently in Pain: Denies  Vital Signs  Patient Currently in Pain: Denies  Social/Functional History  Social/Functional History  Lives With: Spouse  Type of Home: House  Home Layout: One level (Pt reports the food is in the basement)  Home Access: Stairs to enter without rails  Entrance Stairs - Number of Steps: 3  Bathroom Shower/Tub: Walk-in shower, Shower chair without back  Bathroom Toilet: Handicap height  Bathroom Equipment: Grab bars in shower, Shower chair  Bathroom Accessibility: Accessible  Home Equipment: [employer] [place] [employer] Help From: Family (two daughters)  ADL Assistance: Independent  Homemaking Assistance: Independent  Homemaking Responsibilities: Yes  Ambulation Assistance: Independent  Transfer Assistance: Independent  Active : Yes  Mode of Transportation: [place]  Occupation: Retired  Leisure & Hobbies: Ride a bike  Additional Comments: Pt reports 2-3 falls in past 3-6 months as a result from not being able to step over a curb or tripping over an item       Objective   Vision: Impaired  Vision Exceptions: Legally blind;Wears glasses at all times (R eye legally blind)  Hearing: Within functional limits    Orientation  Overall Orientation Status: Within Functional Limits  Observation/Palpation  Posture: Fair (Rounded shoulders and forward head)  Standing Balance  Time: ~200 feet (~50 with SPC, 150' with RW) CGA to SBA  Wheelchair Bed Transfers  Wheelchair/Bed - Technique: Ambulating  Equipment Used: Bed;Other  Level of Asssistance: Contact guard assistance;Stand by assistance  ADL  Feeding: Independent  Grooming: Setup;Contact guard assistance (stance at sink to complete oral care, face washing)  LE Dressing: Contact guard assistance;Minimal assistance  Tone RUE  RUE Tone: Normotonic  Tone LUE  LUE Tone: Normotonic  Coordination  Movements Are Fluid And Coordinated: Yes     Bed mobility  Supine to Sit: Stand by assistance  Scooting: Stand by assistance  Transfers  Sit to stand: Contact guard assistance  Stand to sit: Contact guard assistance  Vision - Basic Assessment  Prior Vision: Wears glasses all the time  Patient Visual Report: No visual complaint reported.   Cognition  Overall Cognitive Status: WFL  Perception  Overall Perceptual Status: WFL     Sensation  Overall Sensation Status: Impaired (Neuropathy in feet)        LUE AROM (degrees)  LUE AROM : WFL  RUE AROM (degrees)  RUE AROM : WFL  LUE Strength  L Hand General: 5/5  RUE Strength  R Hand General: 5/5                    AM-PAC Score        AM-PAC Inpatient Daily Activity Raw Score: 18 (10/19/21 1348)  AM-PAC Inpatient ADL T-Scale Score : 38.66 (10/19/21 1348)  ADL Inpatient CMS 0-100% Score: 46.65 (10/19/21 1348)  ADL Inpatient CMS G-Code Modifier : CK (10/19/21 1348)    Goals  Patient Goals   Patient goals : Evaluation only-discharged this date       Therapy Time   Individual Concurrent Group Co-treatment   Time In 0933         Time Out 1029         Minutes 56              Timed Code Treatment Minutes:  40 Minutes    Total Treatment Minutes:  [facility], OTR/L [id number]

## 2021-10-19 NOTE — CARE COORDINATION
Patient discharged 10/19/21 to home with family. Declined skilled hhc services.     All discharge needs met per case management

## 2021-10-19 NOTE — CARE COORDINATION
Patient , spouse and daughter declines [facility] services. Daughter in the room to  patient.

## 2021-10-19 NOTE — DISCHARGE SUMMARY
Data- discharge order received, pt verbalized agreement to discharge, disposition to previous residence, no needs for HHC/DME. Action- discharge instructions prepared and given to pt and daughter, pt verbalized understanding. Medication information packet given r/t NEW and/or CHANGED prescriptions emphasizing name/purpose/side effects, pt verbalized understanding. Discharge instruction summary: Diet- regular/ low sodium, Activity- up as tolerated, uses cane. Primary Care Physician as follows: Anabela Lopez [phone] f/u appointment in 1 week, immunizations reviewed and updated, prescription medications filled by daughter at normal pharmacy. 1. WEIGHT: Admit Weight: 148 lb (67.1 kg) (10/16/21 1434)        Today  Weight: 146 lb (66.2 kg) (10/16/21 1802)       2. O2 SAT.: SpO2: 97 % (10/19/21 0807)    Response- Pt belongings gathered, IV removed. Disposition is home (no HHC/DME needs), transported with RN, taken to lobby via w/c w/ daughter, no complications.

## 2021-12-14 NOTE — PROGRESS NOTES
[address]  H+P // Shaw Gonzalez // OP VISIT // FU VISIT    REFERRING Lesley Blanchard  ENC TYPE FU    CC HX HPI   GEN  Doing well. No new concerns. CAD  Denies cp, sob, dizzy, syncope, palps. HTN  Amb bp in good range, no ha or dizzy. CHOL  Last chol reviewed, on statin w/out sa   MED  Compliant with CV meds listed below w/out reported sa. HISTORY/ALLLERGY/ROS   MedHx  has a past medical history of CAD (coronary artery disease), Diabetes insipidus ([address] Hypercholesteremia, and Hypertension. SurgHx  has a past surgical history that includes Coronary artery bypass graft; Retinal detachment surgery; hernia repair; knee surgery; Appendectomy (10-); and Thyroid surgery. SocHx  reports that he has never smoked. He has never used smokeless tobacco. He reports current alcohol use. He reports that he does not use drugs. FamHx family history includes Alzheimer's Disease in his father; Heart Failure in his mother; Parkinsonism in his mother. Allerg Patient has no known allergies. ROS [x]Full ROS obtained and negative except as mentioned in HPI      MEDICATIONS      Current Outpatient Medications   Medication Sig Dispense Refill    aMILoride (MIDAMOR) 5 MG tablet Take 2 tablets by mouth daily 30 tablet 0    lamoTRIgine (LAMICTAL) 100 MG tablet Take 100 mg by mouth 2 times daily      lithium 300 MG tablet Take 300 mg by mouth daily      DULoxetine HCl 60 MG CSDR Take 60 mg by mouth daily      OLANZapine (ZYPREXA) 5 MG tablet Cut one 5 mg tablet in half. Take a half-tablet (2.5 mg) twice a day, once in the morning and once in the evening.  Indications: Bipolar Disorder, MOOD      mirtazapine (REMERON) 30 MG tablet Take 30 mg by mouth nightly      pravastatin (PRAVACHOL) 40 MG tablet Take 0.5 tablets by mouth daily 45 tablet 3    Cyanocobalamin (B-12) 1000 MCG CAPS Take 1,000 mcg by mouth every evening      lidocaine-prilocaine (EMLA) 2.5-2.5 % cream APPLY CREAM TOPICALLY IF NEEDED 5    polyethylene glycol (MIRALAX) powder Take 17 g by mouth every other day       levothyroxine (SYNTHROID) 25 MCG tablet Take 25 mcg by mouth Daily.  Multiple Vitamin (MULTIVITAMIN PO) Take  by mouth daily.  aspirin 81 MG EC tablet Take 81 mg by mouth daily. No current facility-administered medications for this visit.      [x]Reviewed with patient and will remain unchanged except as mentioned in A/P    PHYSICAL EXAM   Vitals:    12/15/21 0932   BP: 114/78   Pulse: 74   SpO2: 98%        Gen Alert, coop, no distress Heart  Rrr, no mrg   Head NC, AT, no abnorm Abd  Soft, NT, +BS, no mass, no OM   Eyes PER, conj/corn clear Ext  Ext nl, AT, no C/C/E   Nose Nares nl, no drain, NT Pulse 2+ and symmetric   Throat Lips, mucosa, tongue nl Skin Col/text/turg nl, no vis rash/les   Neck S/S, TM, NT, no bruit/JVD Psych Nl mood and affect   Lung CTA-B, unlabored, no DTP Lymph   No cervical or axillary LA   Ch wall NT, no deform Neuro  Nl gross M/S exam     ASSESSMENT AND PLAN     *CAD   Date EF Results   Sx   No concerning   Hx 9/09  CABG   LHC 9/09  MVD->CABG   MPI 1/12  10/17  10/21   74%  74% Normal perfusion  Normal perfusion  Normal perfusion   TTE 7/17  1/18  10/21 60%  60%  60%        Plan   Continue aggressive medical treatment at doses above  No bb with low bp and depression   *HTN  Status Controlled  Plan Counseled on diet/salt/exercise/weight, continue meds at doses above  *CHOL  Status  Controlled with last LDL of 58(goal <70) and HDL of 42(6/21)  Plan Counseled on diet/exercise/weight, continue statin, lipid/liver surveillance per PCP  *COMPLIANCE  Status Compliant  Plan Discussed importance of compliance with meds/diet/salt/exercise; avoid tob/alc/drugs; patient verbalized understanding  *FOLLOWUP  12 months    Scribe Attestation  I, Don Carrasco am scribing for and in the presence of Christen Nicolas MD.   Signed, Don Carrasco 12/14/21 9:25 AM  Provider Gerri Partida is working as a scribe for and in the presence of wyatt Antoine MD). Working as a scribe, Shania Horton may have prepopulated components of this note with my historical  intellectual property under my direct supervision. Any additions to this intellectual property were performed in my presence and at my direction. Furthermore, the content and accuracy of this note have been reviewed by wyatt Antoine MD).  12/15/2021 9:29 AM    CODING   Category Diagnosis   Stable chronic illness  (58037/84644 - 2 or more) CAD, HTN, CHOL   Chronic illness w/: Exac, progr or SA of Tx  (52970/59578 - 1 or more)    Time 30-39 minutes spent preparing to see patient including reviewing patient history/prior tests/prior consults, performing a medical exam, counseling and educating patient/family/caregiver, ordering medications/tests/procedures, referring and communicating with PCPs and other pertinent consultants, documenting information in the EMR, independently interpreting results and communicating to family and coordination of patient care.

## 2021-12-15 ENCOUNTER — OFFICE VISIT (OUTPATIENT)
Dept: CARDIOLOGY CLINIC | Age: 86
End: 2021-12-15
Payer: MEDICARE

## 2021-12-15 VITALS
DIASTOLIC BLOOD PRESSURE: 78 MMHG | HEART RATE: 74 BPM | HEIGHT: 70 IN | OXYGEN SATURATION: 98 % | BODY MASS INDEX: 22.33 KG/M2 | SYSTOLIC BLOOD PRESSURE: 114 MMHG | WEIGHT: 156 LBS

## 2021-12-15 DIAGNOSIS — I10 ESSENTIAL HYPERTENSION: ICD-10-CM

## 2021-12-15 DIAGNOSIS — I25.10 CORONARY ARTERY DISEASE DUE TO LIPID RICH PLAQUE: Primary | ICD-10-CM

## 2021-12-15 DIAGNOSIS — I25.83 CORONARY ARTERY DISEASE DUE TO LIPID RICH PLAQUE: Primary | ICD-10-CM

## 2021-12-15 DIAGNOSIS — E78.00 HYPERCHOLESTEREMIA: ICD-10-CM

## 2021-12-15 PROCEDURE — 4040F PNEUMOC VAC/ADMIN/RCVD: CPT | Performed by: INTERNAL MEDICINE

## 2021-12-15 PROCEDURE — 1123F ACP DISCUSS/DSCN MKR DOCD: CPT | Performed by: INTERNAL MEDICINE

## 2021-12-15 PROCEDURE — G8420 CALC BMI NORM PARAMETERS: HCPCS | Performed by: INTERNAL MEDICINE

## 2021-12-15 PROCEDURE — G8427 DOCREV CUR MEDS BY ELIG CLIN: HCPCS | Performed by: INTERNAL MEDICINE

## 2021-12-15 PROCEDURE — 99214 OFFICE O/P EST MOD 30 MIN: CPT | Performed by: INTERNAL MEDICINE

## 2021-12-15 PROCEDURE — G8484 FLU IMMUNIZE NO ADMIN: HCPCS | Performed by: INTERNAL MEDICINE

## 2021-12-15 PROCEDURE — 1036F TOBACCO NON-USER: CPT | Performed by: INTERNAL MEDICINE

## 2023-02-21 ENCOUNTER — HOSPITAL ENCOUNTER (OUTPATIENT)
Age: 88
Discharge: HOME | End: 2023-02-21

## 2023-02-21 ENCOUNTER — HOSPITAL ENCOUNTER (OUTPATIENT)
Dept: GENERAL RADIOLOGY | Age: 88
Discharge: HOME | End: 2023-02-21

## 2023-02-21 DIAGNOSIS — Z02.1 NEED FOR HISTORY AND PHYSICAL EXAMINATION FOR EMPLOYMENT: ICD-10-CM

## 2023-02-21 PROCEDURE — 71046 X-RAY EXAM CHEST 2 VIEWS: CPT

## 2023-08-31 ENCOUNTER — OFFICE VISIT (OUTPATIENT)
Dept: CARDIOLOGY CLINIC | Age: 88
End: 2023-08-31
Payer: MEDICARE

## 2023-08-31 VITALS
WEIGHT: 141 LBS | BODY MASS INDEX: 20.19 KG/M2 | OXYGEN SATURATION: 98 % | DIASTOLIC BLOOD PRESSURE: 64 MMHG | HEART RATE: 73 BPM | HEIGHT: 70 IN | SYSTOLIC BLOOD PRESSURE: 108 MMHG

## 2023-08-31 DIAGNOSIS — E78.00 HYPERCHOLESTEROLEMIA: ICD-10-CM

## 2023-08-31 DIAGNOSIS — I25.83 CORONARY ARTERY DISEASE DUE TO LIPID RICH PLAQUE: Primary | ICD-10-CM

## 2023-08-31 DIAGNOSIS — I10 ESSENTIAL HYPERTENSION: ICD-10-CM

## 2023-08-31 DIAGNOSIS — I25.10 CORONARY ARTERY DISEASE DUE TO LIPID RICH PLAQUE: Primary | ICD-10-CM

## 2023-08-31 PROCEDURE — 1036F TOBACCO NON-USER: CPT | Performed by: INTERNAL MEDICINE

## 2023-08-31 PROCEDURE — 1123F ACP DISCUSS/DSCN MKR DOCD: CPT | Performed by: INTERNAL MEDICINE

## 2023-08-31 PROCEDURE — 99214 OFFICE O/P EST MOD 30 MIN: CPT | Performed by: INTERNAL MEDICINE

## 2023-08-31 PROCEDURE — 3074F SYST BP LT 130 MM HG: CPT | Performed by: INTERNAL MEDICINE

## 2023-08-31 PROCEDURE — 3078F DIAST BP <80 MM HG: CPT | Performed by: INTERNAL MEDICINE

## 2023-08-31 PROCEDURE — G8427 DOCREV CUR MEDS BY ELIG CLIN: HCPCS | Performed by: INTERNAL MEDICINE

## 2023-08-31 PROCEDURE — G8420 CALC BMI NORM PARAMETERS: HCPCS | Performed by: INTERNAL MEDICINE

## 2023-11-04 ENCOUNTER — APPOINTMENT (OUTPATIENT)
Dept: GENERAL RADIOLOGY | Age: 88
DRG: 690 | End: 2023-11-04
Payer: MEDICARE

## 2023-11-04 ENCOUNTER — HOSPITAL ENCOUNTER (INPATIENT)
Age: 88
LOS: 1 days | Discharge: HOME | DRG: 690 | End: 2023-11-05
Attending: FAMILY MEDICINE | Admitting: FAMILY MEDICINE
Payer: MEDICARE

## 2023-11-04 ENCOUNTER — APPOINTMENT (OUTPATIENT)
Dept: CT IMAGING | Age: 88
DRG: 690 | End: 2023-11-04
Payer: MEDICARE

## 2023-11-04 DIAGNOSIS — N39.0 URINARY TRACT INFECTION ASSOCIATED WITH CYSTOSTOMY CATHETER, INITIAL ENCOUNTER (HCC): ICD-10-CM

## 2023-11-04 DIAGNOSIS — R79.89 ELEVATED TROPONIN: ICD-10-CM

## 2023-11-04 DIAGNOSIS — T83.510A URINARY TRACT INFECTION ASSOCIATED WITH CYSTOSTOMY CATHETER, INITIAL ENCOUNTER (HCC): ICD-10-CM

## 2023-11-04 DIAGNOSIS — S09.90XA CLOSED HEAD INJURY WITHOUT LOSS OF CONSCIOUSNESS, INITIAL ENCOUNTER: ICD-10-CM
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
## 2023-11-04 DIAGNOSIS — R42 DIZZINESS: Primary | ICD-10-CM
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
## 2023-11-04 PROBLEM — R55 SYNCOPE AND COLLAPSE: Status: ACTIVE | Noted: 2023-11-04

## 2023-11-04 LAB
ALBUMIN SERPL-MCNC: 5 G/DL (ref 3.4–5)
ALBUMIN/GLOB SERPL: 1.6 {RATIO} (ref 1.1–2.2)
ALP SERPL-CCNC: 101 U/L (ref 40–129)
ALT SERPL-CCNC: <5 U/L (ref 10–40)
ANION GAP SERPL CALCULATED.3IONS-SCNC: 16 MMOL/L (ref 3–16)
AST SERPL-CCNC: 11 U/L (ref 15–37)
BACTERIA URNS QL MICRO: ABNORMAL /HPF
BASOPHILS # BLD: 0.1 K/UL (ref 0–0.2)
BASOPHILS NFR BLD: 0.6 %
BILIRUB SERPL-MCNC: 0.5 MG/DL (ref 0–1)
BILIRUB UR QL STRIP.AUTO: NEGATIVE
BUN SERPL-MCNC: 31 MG/DL (ref 7–20)
CALCIUM SERPL-MCNC: 9.9 MG/DL (ref 8.3–10.6)
CHLORIDE SERPL-SCNC: 107 MMOL/L (ref 99–110)
CLARITY UR: CLEAR
CO2 SERPL-SCNC: 20 MMOL/L (ref 21–32)
COLOR UR: YELLOW
CREAT SERPL-MCNC: 1.9 MG/DL (ref 0.8–1.3)
DEPRECATED RDW RBC AUTO: 14.1 % (ref 12.4–15.4)
EOSINOPHIL # BLD: 0.2 K/UL (ref 0–0.6)
EOSINOPHIL NFR BLD: 1.8 %
EPI CELLS #/AREA URNS AUTO: 0 /HPF (ref 0–5)
GFR SERPLBLD CREATININE-BSD FMLA CKD-EPI: 34 ML/MIN/{1.73_M2}
GLUCOSE SERPL-MCNC: 95 MG/DL (ref 70–99)
GLUCOSE UR STRIP.AUTO-MCNC: NEGATIVE MG/DL
HCT VFR BLD AUTO: 39.4 % (ref 40.5–52.5)
HGB BLD-MCNC: 12.6 G/DL (ref 13.5–17.5)
HGB UR QL STRIP.AUTO: NEGATIVE
HYALINE CASTS #/AREA URNS AUTO: 0 /LPF (ref 0–8)
KETONES UR STRIP.AUTO-MCNC: NEGATIVE MG/DL
LEUKOCYTE ESTERASE UR QL STRIP.AUTO: ABNORMAL
LYMPHOCYTES # BLD: 2.4 K/UL (ref 1–5.1)
LYMPHOCYTES NFR BLD: 20.1 %
MCH RBC QN AUTO: 30.3 PG (ref 26–34)
MCHC RBC AUTO-ENTMCNC: 31.9 G/DL (ref 31–36)
MCV RBC AUTO: 94.9 FL (ref 80–100)
MONOCYTES # BLD: 0.6 K/UL (ref 0–1.3)
MONOCYTES NFR BLD: 5 %
NEUTROPHILS # BLD: 8.7 K/UL (ref 1.7–7.7)
NEUTROPHILS NFR BLD: 72.5 %
NITRITE UR QL STRIP.AUTO: POSITIVE
NT-PROBNP SERPL-MCNC: 304 PG/ML (ref 0–449)
PH UR STRIP.AUTO: 6.5 [PH] (ref 5–8)
PLATELET # BLD AUTO: 304 K/UL (ref 135–450)
PMV BLD AUTO: 7.9 FL (ref 5–10.5)
POTASSIUM SERPL-SCNC: 4.8 MMOL/L (ref 3.5–5.1)
PROT SERPL-MCNC: 8.2 G/DL (ref 6.4–8.2)
PROT UR STRIP.AUTO-MCNC: 30 MG/DL
RBC # BLD AUTO: 4.16 M/UL (ref 4.2–5.9)
RBC #/AREA URNS HPF: ABNORMAL /HPF (ref 0–4)
SODIUM SERPL-SCNC: 143 MMOL/L (ref 136–145)
SP GR UR STRIP.AUTO: <=1.005 (ref 1–1.03)
TROPONIN, HIGH SENSITIVITY: 51 NG/L (ref 0–22)
TROPONIN, HIGH SENSITIVITY: 54 NG/L (ref 0–22)
UA COMPLETE W REFLEX CULTURE PNL UR: ABNORMAL
UA DIPSTICK W REFLEX MICRO PNL UR: YES
URN SPEC COLLECT METH UR: ABNORMAL
UROBILINOGEN UR STRIP-ACNC: 0.2 E.U./DL
WBC # BLD AUTO: 11.9 K/UL (ref 4–11)
WBC #/AREA URNS AUTO: 5 /HPF (ref 0–5)
YEAST URNS QL MICRO: PRESENT /HPF

## 2023-11-04 PROCEDURE — 83880 ASSAY OF NATRIURETIC PEPTIDE: CPT

## 2023-11-04 PROCEDURE — 71045 X-RAY EXAM CHEST 1 VIEW: CPT

## 2023-11-04 PROCEDURE — 6360000002 HC RX W HCPCS: Performed by: FAMILY MEDICINE

## 2023-11-04 PROCEDURE — 81001 URINALYSIS AUTO W/SCOPE: CPT

## 2023-11-04 PROCEDURE — 6370000000 HC RX 637 (ALT 250 FOR IP): Performed by: FAMILY MEDICINE

## 2023-11-04 PROCEDURE — 84484 ASSAY OF TROPONIN QUANT: CPT

## 2023-11-04 PROCEDURE — 72125 CT NECK SPINE W/O DYE: CPT

## 2023-11-04 PROCEDURE — 2580000003 HC RX 258: Performed by: FAMILY MEDICINE

## 2023-11-04 PROCEDURE — 99285 EMERGENCY DEPT VISIT HI MDM: CPT

## 2023-11-04 PROCEDURE — 2580000003 HC RX 258: Performed by: PHYSICIAN ASSISTANT

## 2023-11-04 PROCEDURE — 93005 ELECTROCARDIOGRAM TRACING: CPT | Performed by: PHYSICIAN ASSISTANT

## 2023-11-04 PROCEDURE — 80053 COMPREHEN METABOLIC PANEL: CPT

## 2023-11-04 PROCEDURE — 96365 THER/PROPH/DIAG IV INF INIT: CPT

## 2023-11-04 PROCEDURE — 1200000000 HC SEMI PRIVATE

## 2023-11-04 PROCEDURE — 6360000002 HC RX W HCPCS: Performed by: PHYSICIAN ASSISTANT

## 2023-11-04 PROCEDURE — 85025 COMPLETE CBC W/AUTO DIFF WBC: CPT

## 2023-11-04 PROCEDURE — 70450 CT HEAD/BRAIN W/O DYE: CPT

## 2023-11-04 RX ORDER — SPIRONOLACTONE 25 MG/1
25 TABLET ORAL DAILY
Status: DISCONTINUED | OUTPATIENT
Start: 2023-11-04 | End: 2023-11-05 | Stop reason: HOSPADM

## 2023-11-04 RX ORDER — LAMOTRIGINE 100 MG/1
100 TABLET ORAL 2 TIMES DAILY
Status: DISCONTINUED | OUTPATIENT
Start: 2023-11-04 | End: 2023-11-05 | Stop reason: HOSPADM

## 2023-11-04 RX ORDER — LEVOTHYROXINE SODIUM 0.03 MG/1
25 TABLET ORAL DAILY
Status: DISCONTINUED | OUTPATIENT
Start: 2023-11-04 | End: 2023-11-05 | Stop reason: HOSPADM

## 2023-11-04 RX ORDER — MIRTAZAPINE 15 MG/1
30 TABLET, FILM COATED ORAL NIGHTLY
Status: DISCONTINUED | OUTPATIENT
Start: 2023-11-04 | End: 2023-11-04

## 2023-11-04 RX ORDER — ALLOPURINOL 100 MG/1
50 TABLET ORAL DAILY
COMMUNITY

## 2023-11-04 RX ORDER — LITHIUM CARBONATE 300 MG/1
300 CAPSULE ORAL DAILY
Status: DISCONTINUED | OUTPATIENT
Start: 2023-11-04 | End: 2023-11-05 | Stop reason: HOSPADM

## 2023-11-04 RX ORDER — ASPIRIN 81 MG/1
81 TABLET ORAL DAILY
Status: DISCONTINUED | OUTPATIENT
Start: 2023-11-04 | End: 2023-11-05 | Stop reason: HOSPADM

## 2023-11-04 RX ORDER — POLYETHYLENE GLYCOL 3350 17 G/17G
17 POWDER, FOR SOLUTION ORAL DAILY PRN
Status: DISCONTINUED | OUTPATIENT
Start: 2023-11-04 | End: 2023-11-05 | Stop reason: HOSPADM

## 2023-11-04 RX ORDER — SODIUM CHLORIDE 0.9 % (FLUSH) 0.9 %
5-40 SYRINGE (ML) INJECTION EVERY 12 HOURS SCHEDULED
Status: DISCONTINUED | OUTPATIENT
Start: 2023-11-04 | End: 2023-11-05 | Stop reason: HOSPADM

## 2023-11-04 RX ORDER — ACETAMINOPHEN 500 MG
500 TABLET ORAL EVERY 6 HOURS PRN
COMMUNITY

## 2023-11-04 RX ORDER — ONDANSETRON 2 MG/ML
4 INJECTION INTRAMUSCULAR; INTRAVENOUS EVERY 6 HOURS PRN
Status: DISCONTINUED | OUTPATIENT
Start: 2023-11-04 | End: 2023-11-05 | Stop reason: HOSPADM

## 2023-11-04 RX ORDER — PRAVASTATIN SODIUM 20 MG
20 TABLET ORAL EVERY EVENING
COMMUNITY

## 2023-11-04 RX ORDER — ACETAMINOPHEN 650 MG/1
650 SUPPOSITORY RECTAL EVERY 6 HOURS PRN
Status: DISCONTINUED | OUTPATIENT
Start: 2023-11-04 | End: 2023-11-05 | Stop reason: HOSPADM

## 2023-11-04 RX ORDER — ONDANSETRON 4 MG/1
4 TABLET, ORALLY DISINTEGRATING ORAL EVERY 8 HOURS PRN
Status: DISCONTINUED | OUTPATIENT
Start: 2023-11-04 | End: 2023-11-05 | Stop reason: HOSPADM

## 2023-11-04 RX ORDER — ACETAMINOPHEN 325 MG/1
650 TABLET ORAL EVERY 6 HOURS PRN
Status: DISCONTINUED | OUTPATIENT
Start: 2023-11-04 | End: 2023-11-05 | Stop reason: HOSPADM

## 2023-11-04 RX ORDER — ENOXAPARIN SODIUM 100 MG/ML
30 INJECTION SUBCUTANEOUS NIGHTLY
Status: DISCONTINUED | OUTPATIENT
Start: 2023-11-04 | End: 2023-11-05 | Stop reason: HOSPADM

## 2023-11-04 RX ORDER — 0.9 % SODIUM CHLORIDE 0.9 %
1000 INTRAVENOUS SOLUTION INTRAVENOUS ONCE
Status: COMPLETED | OUTPATIENT
Start: 2023-11-04 | End: 2023-11-04

## 2023-11-04 RX ORDER — SODIUM CHLORIDE 9 MG/ML
INJECTION, SOLUTION INTRAVENOUS PRN
Status: DISCONTINUED | OUTPATIENT
Start: 2023-11-04 | End: 2023-11-05 | Stop reason: HOSPADM

## 2023-11-04 RX ORDER — AMILORIDE HYDROCHLORIDE 5 MG/1
10 TABLET ORAL DAILY
Status: DISCONTINUED | OUTPATIENT
Start: 2023-11-04 | End: 2023-11-04 | Stop reason: CLARIF

## 2023-11-04 RX ORDER — PRAVASTATIN SODIUM 20 MG
20 TABLET ORAL DAILY
Status: DISCONTINUED | OUTPATIENT
Start: 2023-11-04 | End: 2023-11-04

## 2023-11-04 RX ORDER — OLANZAPINE 5 MG/1
2.5 TABLET ORAL 2 TIMES DAILY
Status: DISCONTINUED | OUTPATIENT
Start: 2023-11-04 | End: 2023-11-04

## 2023-11-04 RX ORDER — SODIUM CHLORIDE 0.9 % (FLUSH) 0.9 %
5-40 SYRINGE (ML) INJECTION PRN
Status: DISCONTINUED | OUTPATIENT
Start: 2023-11-04 | End: 2023-11-05 | Stop reason: HOSPADM

## 2023-11-04 RX ADMIN — CARBIDOPA AND LEVODOPA 2 TABLET: 25; 100 TABLET ORAL at 19:07

## 2023-11-04 RX ADMIN — SODIUM CHLORIDE, PRESERVATIVE FREE 10 ML: 5 INJECTION INTRAVENOUS at 20:30

## 2023-11-04 RX ADMIN — SPIRONOLACTONE 25 MG: 25 TABLET ORAL at 19:07

## 2023-11-04 RX ADMIN — ENOXAPARIN SODIUM 30 MG: 100 INJECTION SUBCUTANEOUS at 20:19

## 2023-11-04 RX ADMIN — CEFTRIAXONE SODIUM 1000 MG: 1 INJECTION, POWDER, FOR SOLUTION INTRAMUSCULAR; INTRAVENOUS at 14:02

## 2023-11-04 RX ADMIN — LAMOTRIGINE 100 MG: 100 TABLET ORAL at 20:19

## 2023-11-04 RX ADMIN — ASPIRIN 81 MG: 81 TABLET, COATED ORAL at 19:07

## 2023-11-04 RX ADMIN — LITHIUM CARBONATE 300 MG: 300 CAPSULE, GELATIN COATED ORAL at 19:07

## 2023-11-04 RX ADMIN — SODIUM CHLORIDE 1000 ML: 9 INJECTION, SOLUTION INTRAVENOUS at 14:18

## 2023-11-04 ASSESSMENT — ENCOUNTER SYMPTOMS
CHEST TIGHTNESS: 0
ABDOMINAL PAIN: 0
VOMITING: 0
SHORTNESS OF BREATH: 0
COUGH: 0
NAUSEA: 0
DIARRHEA: 0
BACK PAIN: 0

## 2023-11-04 ASSESSMENT — LIFESTYLE VARIABLES
HOW OFTEN DO YOU HAVE A DRINK CONTAINING ALCOHOL: NEVER
HOW MANY STANDARD DRINKS CONTAINING ALCOHOL DO YOU HAVE ON A TYPICAL DAY: PATIENT DOES NOT DRINK

## 2023-11-04 ASSESSMENT — PAIN - FUNCTIONAL ASSESSMENT: PAIN_FUNCTIONAL_ASSESSMENT: 0-10

## 2023-11-04 ASSESSMENT — PAIN DESCRIPTION - LOCATION: LOCATION: HEAD

## 2023-11-04 ASSESSMENT — PAIN SCALES - GENERAL
PAINLEVEL_OUTOF10: 1
PAINLEVEL_OUTOF10: 0

## 2023-11-04 NOTE — ED NOTES
..ED TO INPATIENT SBAR HANDOFF    Patient Name: Laurent Rodriguez   :  1935  82 y.o. MRN:  [id number]  Preferred Name  Yenifer Lieberman   ED Room #:  ED-0020/20  Family/Caregiver Present yes   Restraints no   Sitter no   Sepsis Risk Score Sepsis Risk Score: 1.88    Situation  Code Status: FULL CODE    Allergies: Patient has no known allergies. Weight: Patient Vitals for the past 96 hrs (Last 3 readings):   Weight   23 1052 64.4 kg (142 lb)     Arrived from: home  Chief Complaint:   Chief Complaint   Patient presents with    Fall     Pt reports yesterday while walking with a cane, felt dizzy, lost his balance, fell, hit back of head, denies LOC or blood thinners. Reports 1/10 head pain, denies pain elsewhere. A&o x4, denies cp, sob, dizziness. Pt ambulated to triage room with walker. Hospital Problem/Diagnosis:  Principal Problem:    Syncope and collapse  Resolved Problems:    * No resolved hospital problems. *    Imaging:   CT CERVICAL SPINE WO CONTRAST   Final Result   No acute abnormality of the cervical spine. CT HEAD WO CONTRAST   Final Result   No acute intracranial abnormality. XR CHEST PORTABLE   Final Result   1. No active pulmonary disease.            Abnormal labs:   Abnormal Labs Reviewed   CBC WITH AUTO DIFFERENTIAL - Abnormal; Notable for the following components:       Result Value    WBC 11.9 (*)     RBC 4.16 (*)     Hemoglobin 12.6 (*)     Hematocrit 39.4 (*)     Neutrophils Absolute 8.7 (*)     All other components within normal limits   COMPREHENSIVE METABOLIC PANEL - Abnormal; Notable for the following components:    CO2 20 (*)     BUN 31 (*)     Creatinine 1.9 (*)     Est, Glom Filt Rate 34 (*)     ALT <5 (*)     AST 11 (*)     All other components within normal limits   URINALYSIS WITH REFLEX TO CULTURE - Abnormal; Notable for the following components:    Protein, UA 30 (*)     Nitrite, Urine POSITIVE (*)     Leukocyte Esterase, Urine SMALL (*)     All other components within normal limits   TROPONIN - Abnormal; Notable for the following components:    Troponin, High Sensitivity 54 (*)     All other components within normal limits   TROPONIN - Abnormal; Notable for the following components:    Troponin, High Sensitivity 51 (*)     All other components within normal limits   MICROSCOPIC URINALYSIS - Abnormal; Notable for the following components:    Bacteria, UA Rare (*)     Yeast, UA Present (*)     All other components within normal limits     Critical values: no     Abnormal Assessment Findings: UTI, ELEVATED TROPONIN    Background  History:   Past Medical History:   Diagnosis Date    CAD (coronary artery disease) 2009    CABG    Diabetes insipidus (HCC)     Hypercholesteremia     Hypertension        Assessment    Vitals/MEWS: MEWS Score: 1  Level of Consciousness: Alert (0)   Vitals:    11/04/23 1419 11/04/23 1530 11/04/23 1646 11/04/23 1700   BP:  137/74 127/63 117/69   Pulse:       Resp:       Temp:       TempSrc:       SpO2: 100%  100% 100%   Weight:       Height:         FiO2 (%): N/A  O2 Flow Rate: O2 Device: None (Room air)    Cardiac Rhythm:    Pain Assessment: 0 Verbal [] [place] Scale  Pain Scale: Pain Assessment  Pain Assessment: 0-10  Pain Level: 1  Patient's Stated Pain Goal: 0 - No pain  Pain Location: Head  Last documented pain score (0-10 scale) Pain Level: 1  Last documented pain medication administered: N/A  Mental Status: alert  Orientation Level:  A&O X4  NIH Score:  0  C-SSRS: Risk of Suicide: No Risk  Bedside swallow:    [place] Coma Scale (GCS): [place] Coma Scale  Eye Opening: Spontaneous  Best Verbal Response: Oriented  Best Motor Response: Obeys commands  Jan Coma Scale Score: 15  Active LDA's:   Peripheral IV 11/04/23 Left Forearm (Active)   Site Assessment Clean, dry & intact 11/04/23 1125   Line Status Blood return noted 11/04/23 [address] changed 11/04/23 1125   Phlebitis Assessment No symptoms 11/04/23 1125   Infiltration

## 2023-11-05 VITALS
HEART RATE: 70 BPM | DIASTOLIC BLOOD PRESSURE: 88 MMHG | SYSTOLIC BLOOD PRESSURE: 122 MMHG | TEMPERATURE: 98 F | BODY MASS INDEX: 20.83 KG/M2 | HEIGHT: 70 IN | RESPIRATION RATE: 17 BRPM | OXYGEN SATURATION: 100 % | WEIGHT: 145.5 LBS

## 2023-11-05 LAB
ANION GAP SERPL CALCULATED.3IONS-SCNC: 13 MMOL/L (ref 3–16)
BUN SERPL-MCNC: 25 MG/DL (ref 7–20)
CALCIUM SERPL-MCNC: 9.5 MG/DL (ref 8.3–10.6)
CHLORIDE SERPL-SCNC: 111 MMOL/L (ref 99–110)
CO2 SERPL-SCNC: 18 MMOL/L (ref 21–32)
CREAT SERPL-MCNC: 1.8 MG/DL (ref 0.8–1.3)
DEPRECATED RDW RBC AUTO: 14.2 % (ref 12.4–15.4)
EKG ATRIAL RATE: 82 BPM
EKG DIAGNOSIS: NORMAL
EKG P AXIS: 71 DEGREES
EKG P-R INTERVAL: 256 MS
EKG Q-T INTERVAL: 416 MS
EKG QRS DURATION: 98 MS
EKG QTC CALCULATION (BAZETT): 486 MS
EKG R AXIS: -11 DEGREES
EKG T AXIS: 35 DEGREES
EKG VENTRICULAR RATE: 82 BPM
GFR SERPLBLD CREATININE-BSD FMLA CKD-EPI: 36 ML/MIN/{1.73_M2}
GLUCOSE SERPL-MCNC: 109 MG/DL (ref 70–99)
HCT VFR BLD AUTO: 36.3 % (ref 40.5–52.5)
HGB BLD-MCNC: 11.5 G/DL (ref 13.5–17.5)
MCH RBC QN AUTO: 30.1 PG (ref 26–34)
MCHC RBC AUTO-ENTMCNC: 31.8 G/DL (ref 31–36)
MCV RBC AUTO: 94.8 FL (ref 80–100)
PLATELET # BLD AUTO: 269 K/UL (ref 135–450)
PMV BLD AUTO: 7.7 FL (ref 5–10.5)
POTASSIUM SERPL-SCNC: 4.3 MMOL/L (ref 3.5–5.1)
RBC # BLD AUTO: 3.83 M/UL (ref 4.2–5.9)
SODIUM SERPL-SCNC: 142 MMOL/L (ref 136–145)
WBC # BLD AUTO: 11.2 K/UL (ref 4–11)

## 2023-11-05 PROCEDURE — 93010 ELECTROCARDIOGRAM REPORT: CPT | Performed by: INTERNAL MEDICINE

## 2023-11-05 PROCEDURE — 2580000003 HC RX 258: Performed by: FAMILY MEDICINE

## 2023-11-05 PROCEDURE — 85027 COMPLETE CBC AUTOMATED: CPT

## 2023-11-05 PROCEDURE — 36415 COLL VENOUS BLD VENIPUNCTURE: CPT

## 2023-11-05 PROCEDURE — 80048 BASIC METABOLIC PNL TOTAL CA: CPT

## 2023-11-05 PROCEDURE — 6370000000 HC RX 637 (ALT 250 FOR IP): Performed by: FAMILY MEDICINE

## 2023-11-05 RX ORDER — CEFADROXIL 500 MG/1
500 CAPSULE ORAL 2 TIMES DAILY
Qty: 14 CAPSULE | Refills: 0 | Status: SHIPPED | OUTPATIENT
Start: 2023-11-05 | End: 2023-11-12

## 2023-11-05 RX ORDER — SELENIUM 50 MCG
1 TABLET ORAL 2 TIMES DAILY
Qty: 20 CAPSULE | Refills: 0 | Status: SHIPPED | OUTPATIENT
Start: 2023-11-05 | End: 2023-11-15

## 2023-11-05 RX ADMIN — CARBIDOPA AND LEVODOPA 2 TABLET: 25; 100 TABLET ORAL at 10:40

## 2023-11-05 RX ADMIN — LAMOTRIGINE 100 MG: 100 TABLET ORAL at 08:06

## 2023-11-05 RX ADMIN — SPIRONOLACTONE 25 MG: 25 TABLET ORAL at 08:07

## 2023-11-05 RX ADMIN — SODIUM CHLORIDE, PRESERVATIVE FREE 10 ML: 5 INJECTION INTRAVENOUS at 08:08

## 2023-11-05 RX ADMIN — LEVOTHYROXINE SODIUM 25 MCG: 0.03 TABLET ORAL at 05:53

## 2023-11-05 RX ADMIN — LITHIUM CARBONATE 300 MG: 300 CAPSULE, GELATIN COATED ORAL at 08:07

## 2023-11-05 RX ADMIN — CARBIDOPA AND LEVODOPA 2 TABLET: 25; 100 TABLET ORAL at 03:16

## 2023-11-05 RX ADMIN — ASPIRIN 81 MG: 81 TABLET, COATED ORAL at 08:07

## 2023-11-05 NOTE — DISCHARGE SUMMARY
[address] [facility] DISCHARGE SUMMARY    Patient Demographics    [place]  Date of Birth. 1935  MRN. [id number]     Primary care provider. Cathlean Canavan, MD  (Tel: [phone])    Admit date: 11/4/2023    Discharge date (blank if same as Note Date): Note Date: 11/5/2023     Reason for Hospitalization. Chief Complaint   Patient presents with    Fall     Pt reports yesterday while walking with a cane, felt dizzy, lost his balance, fell, hit back of head, denies LOC or blood thinners. Reports 1/10 head pain, denies pain elsewhere. A&o x4, denies cp, sob, dizziness. Pt ambulated to triage room with walker. Significant Findings. Principal Problem:    Syncope and collapse  Resolved Problems:    * No resolved hospital problems. *       Problems and results from this hospitalization that need follow up. Recommend cardiology follow up this week. Significant test results and incidental findings. CXR 11/4/2023:  IMPRESSION:  1. No active pulmonary disease. CT head 11/4/2023:  IMPRESSION:  No acute intracranial abnormality. CT cervical spine 11/4/2023:  IMPRESSION:  No acute abnormality of the cervical spine. Invasive procedures and treatments. None     [facility] Course. 81 yo male with hx CAD/CABG (2009), HTN, diabetes insipidious, suprapubic catheter. He has hx of neuropathy, ataxia and frequent falls, he follows with  neurology. Presented to ER after fall at home. He had just returned from 2 mile bike ride, he went out to get the mail and fell. He maybe noted mild dizziness, denies LOC. CT head and c-spine with no acute abnormality. CXR with no active. UA with positive for nitrites and he was started on ceftriaxone for suspected UTI, urine culture was not obtained. Troponin low 50s with flat trend, no acute changes on EKG.      Patient up in bedside

## 2023-11-05 NOTE — CARE COORDINATION
CM to bedside to deliver IMM, Patient gone from room, RN reports Patient has already left the hospital post discharge.      Electronically signed by Jayme Jimenez on 11/5/2023 at 3:15 PM

## 2023-11-05 NOTE — PLAN OF CARE
Problem: Discharge Planning  Goal: Discharge to home or other facility with appropriate resources  11/5/2023 1352 by Tia Kirkland RN  Outcome: Completed  11/5/2023 1352 by Tia Kirkland RN  Outcome: Progressing  11/5/2023 0905 by Tia Kirkland RN  Outcome: Progressing  11/5/2023 0410 by Sri Muller RN  Outcome: Progressing  Flowsheets  Taken 11/5/2023 0410 by Sri Muller RN  Discharge to home or other facility with appropriate resources:   Refer to discharge planning if patient needs post-hospital services based on physician order or complex needs related to functional status, cognitive ability or social support system   Arrange for interpreters to assist at discharge as needed   Identify discharge learning needs (meds, wound care, etc)   Arrange for needed discharge resources and transportation as appropriate   Identify barriers to discharge with patient and caregiver  Taken 11/4/2023 1833 by Conor Rubi RN  Discharge to home or other facility with appropriate resources: Identify discharge learning needs (meds, wound care, etc)     Problem: Pain  Goal: Verbalizes/displays adequate comfort level or baseline comfort level  11/5/2023 1352 by Tia Kirkland RN  Outcome: Completed  11/5/2023 1352 by Tia Kirkland RN  Outcome: Progressing  11/5/2023 0905 by Tia Kirkland RN  Outcome: Progressing  11/5/2023 0410 by Sri Muller RN  Outcome: Progressing  Flowsheets (Taken 11/5/2023 0410)  Verbalizes/displays adequate comfort level or baseline comfort level:   Notify Licensed Independent Practitioner if interventions unsuccessful or patient reports new pain   Consider cultural and social influences on pain and pain management   Implement non-pharmacological measures as appropriate and evaluate response   Administer analgesics based on type and severity of pain and evaluate response   Assess pain using appropriate pain scale   Encourage patient to monitor pain and request assistance     Problem: Safety - Adult  Goal: Free from fall injury  11/5/2023 1352 by Behzad Handley RN  Outcome: Completed  11/5/2023 1352 by Behzad Handley RN  Outcome: Progressing  11/5/2023 0905 by Behzad Handley RN  Outcome: Progressing  11/5/2023 0410 by Cristal Pichardo RN  Outcome: Progressing  Flowsheets (Taken 11/5/2023 0410)  Free From Fall Injury:   Based on caregiver fall risk screen, instruct family/caregiver to ask for assistance with transferring infant if caregiver noted to have fall risk factors   Instruct family/caregiver on patient safety     Problem: ABCDS Injury Assessment  Goal: Absence of physical injury  11/5/2023 1352 by Behzad Handley RN  Outcome: Completed  11/5/2023 1352 by Behzad Handley RN  Outcome: Progressing  11/5/2023 0905 by Behzad Handley RN  Outcome: Progressing  11/5/2023 0410 by Cristal Pichardo RN  Outcome: Progressing  Flowsheets (Taken 11/5/2023 0410)  Absence of Physical Injury: Implement safety measures based on patient assessment     Problem: Skin/Tissue Integrity  Goal: Absence of new skin breakdown  Description: 1. Monitor for areas of redness and/or skin breakdown  2. Assess vascular access sites hourly  3. Every 4-6 hours minimum:  Change oxygen saturation probe site  4. Every 4-6 hours:  If on nasal continuous positive airway pressure, respiratory therapy assess nares and determine need for appliance change or resting period.   11/5/2023 1352 by Behzad Handley RN  Outcome: Completed  11/5/2023 1352 by Behzad Handley RN  Outcome: Progressing  11/5/2023 0905 by Behzad Handley RN  Outcome: Progressing  11/5/2023 0410 by Cristal Pichardo RN  Outcome: Progressing

## 2023-11-05 NOTE — PROGRESS NOTES
Data- discharge order received, pt verbalized agreement to discharge, disposition to previous residence, no needs for HHC/DME. Action- discharge instructions prepared and given to patient and spouse, pt verbalized understanding. Medication information packet given r/t NEW and/or CHANGED prescriptions emphasizing name/purpose/side effects, pt verbalized understanding. Discharge instruction summary: Diet- regular, Activity- as tolerated, Primary Care Physician as follows: Alejandra Martin [phone] f/u appointment, immunizations reviewed and updated, prescription medications filled at [facility]. Inpatient surgical procedure precautions reviewed    1. WEIGHT: Admit Weight - Scale: 64.4 kg (142 lb) (11/04/23 1052)        Today  Weight - Scale: 66 kg (145 lb 8.1 oz) (11/05/23 0042)       2. O2 SAT.: SpO2: 100 % (11/05/23 0745)    Response- Pt belongings gathered, IV removed. Disposition is home (no HHC/DME needs), transported with belongings, taken to lobby via w/c w/ daughter and spouse, no complications.

## 2023-11-05 NOTE — PLAN OF CARE
Problem: Discharge Planning  Goal: Discharge to home or other facility with appropriate resources  11/5/2023 0905 by Kj Hunter RN  Outcome: Progressing  11/5/2023 0410 by Yola Villarreal RN  Outcome: Progressing  Flowsheets  Taken 11/5/2023 0410 by Yola Villarreal RN  Discharge to home or other facility with appropriate resources:   Refer to discharge planning if patient needs post-hospital services based on physician order or complex needs related to functional status, cognitive ability or social support system   Arrange for interpreters to assist at discharge as needed   Identify discharge learning needs (meds, wound care, etc)   Arrange for needed discharge resources and transportation as appropriate   Identify barriers to discharge with patient and caregiver  Taken 11/4/2023 1833 by Tessa Rice RN  Discharge to home or other facility with appropriate resources: Identify discharge learning needs (meds, wound care, etc)     Problem: Pain  Goal: Verbalizes/displays adequate comfort level or baseline comfort level  11/5/2023 0905 by Kj Hunter RN  Outcome: Progressing  11/5/2023 0410 by Yola Villarreal RN  Outcome: Progressing  Flowsheets (Taken 11/5/2023 0410)  Verbalizes/displays adequate comfort level or baseline comfort level:   Notify Licensed Independent Practitioner if interventions unsuccessful or patient reports new pain   Consider cultural and social influences on pain and pain management   Implement non-pharmacological measures as appropriate and evaluate response   Administer analgesics based on type and severity of pain and evaluate response   Assess pain using appropriate pain scale   Encourage patient to monitor pain and request assistance     Problem: Safety - Adult  Goal: Free from fall injury  11/5/2023 0905 by Kj Hunter RN  Outcome: Progressing  11/5/2023 0410 by Yola Villarreal RN  Outcome: Progressing  Flowsheets (Taken 11/5/2023 0410)  Free From Fall Injury:

## 2023-11-14 NOTE — PROGRESS NOTES
Physician Progress Note      PATIENT:               Luis Alberto Mast  CSN #:                  [id number]  :                       1935  ADMIT DATE:       2023 10:46 AM  DISCH DATE:        2023 3:31 PM  RESPONDING  PROVIDER #:        Shazia FERNANDEZ - JONATHAN          QUERY TEXT:    Pt admitted with UTI. Pt noted to have chronic suprapubic catheter. If   possible, please document in the progress notes and discharge summary if you   are evaluating and/or treating any of the following: The medical record reflects the following:  Risk Factors: 82year old male w/ suprapubic catheter  Clinical Indicators:  ED provider note- He has a chronic indwelling   suprapubic catheter and urine does appear infected and also has a yeast.    UA- protein 30, nitrite +, small leukocyte esterase, rare bacteria, yeast +.    d/c summary- UA with positive for nitrites and he was started on   ceftriaxone for suspected UTI, urine culture was not obtained  Treatment: Labs, IV Rocephin  Options provided:  -- UTI due to suprapubic catheter  -- UTI not due to suprapubic catheter  -- Other - I will add my own diagnosis  -- Disagree - Not applicable / Not valid  -- Disagree - Clinically unable to determine / Unknown  -- Refer to Clinical Documentation Reviewer    PROVIDER RESPONSE TEXT:    UTI is due to suprapubic catheter. Query created by:  Denise Horton on 2023 7:30 AM      Electronically signed by:  Garfield FERNANDEZ - CNP 2023 2:20 PM

## 2024-03-08 ENCOUNTER — TELEPHONE (OUTPATIENT)
Dept: CARDIOLOGY CLINIC | Age: 89
End: 2024-03-08

## 2024-03-08 NOTE — TELEPHONE ENCOUNTER
Pt wife calling concerned about pt stating he has swelling that starts in his feet and goes up to his knee, pt is tired as well, no signs of SOB, chest pain.  Pt would like to be called   [phone]

## 2024-03-08 NOTE — TELEPHONE ENCOUNTER
Called and spoke to pt, wife. Reports he woke up this morning with new swelling in feet, legs. Denies any chest pain or pressure, SOB/KIRK, dizziness/lightheadedness. Pt does not weigh self daily. Scheduled to see NPRG. Discussed with pt if develops new/worsening symptoms should proceed to ED over the weekend. V/u. Reviewed with PSC in absence of DCE, in agreement with plan.

## 2024-03-08 NOTE — TELEPHONE ENCOUNTER
Pt's wife is checking to when she will receive a call back concerning her husbands swelling.    Please advise.

## 2024-03-11 ENCOUNTER — OFFICE VISIT (OUTPATIENT)
Dept: CARDIOLOGY CLINIC | Age: 89
End: 2024-03-11
Payer: MEDICARE

## 2024-03-11 VITALS
HEIGHT: 70 IN | WEIGHT: 144 LBS | SYSTOLIC BLOOD PRESSURE: 112 MMHG | DIASTOLIC BLOOD PRESSURE: 60 MMHG | BODY MASS INDEX: 20.62 KG/M2 | OXYGEN SATURATION: 98 % | HEART RATE: 77 BPM

## 2024-03-11 DIAGNOSIS — I25.83 CORONARY ARTERY DISEASE DUE TO LIPID RICH PLAQUE: ICD-10-CM

## 2024-03-11 DIAGNOSIS — N12 INTERSTITIAL NEPHRITIS: ICD-10-CM

## 2024-03-11 DIAGNOSIS — I25.10 CORONARY ARTERY DISEASE DUE TO LIPID RICH PLAQUE: ICD-10-CM

## 2024-03-11 DIAGNOSIS — R06.02 SOB (SHORTNESS OF BREATH): ICD-10-CM

## 2024-03-11 DIAGNOSIS — D64.9 ANEMIA, UNSPECIFIED TYPE: ICD-10-CM

## 2024-03-11 DIAGNOSIS — R60.9 SWELLING: Primary | ICD-10-CM

## 2024-03-11 LAB
ALBUMIN SERPL-MCNC: 4 G/DL (ref 3.5–5.7)
ALP BLD-CCNC: 77 U/L (ref 36–125)
ALT SERPL-CCNC: 7 U/L (ref 7–52)
ANION GAP SERPL CALCULATED.3IONS-SCNC: 10 MMOL/L (ref 3–16)
AST SERPL-CCNC: 11 U/L (ref 13–39)
BILIRUB SERPL-MCNC: 0.3 MG/DL (ref 0–1.5)
BUN BLDV-MCNC: 22 MG/DL (ref 7–25)
CALCIUM SERPL-MCNC: 9.8 MG/DL (ref 8.6–10.3)
CHLORIDE BLD-SCNC: 110 MMOL/L (ref 98–110)
CO2: 25 MMOL/L (ref 21–33)
CREAT SERPL-MCNC: 1.83 MG/DL (ref 0.6–1.3)
ESTIMATED GLOMERULAR FILTRATION RATE CREATININE EQUATION: 35
FERRITIN: 98.6 NG/ML (ref 23.9–336.2)
GLUCOSE BLD-MCNC: 105 MG/DL (ref 70–100)
HCT VFR BLD CALC: 34.3 % (ref 38.5–50)
HEMOGLOBIN: 11.3 G/DL (ref 13.2–17.1)
IRON: 71 UG/DL (ref 50–212)
MCH RBC QN AUTO: 31.6 PG (ref 27–33)
MCHC RBC AUTO-ENTMCNC: 32.8 G/DL (ref 32–36)
MCV RBC AUTO: 96.5 FL (ref 80–100)
OSMOLALITY CALCULATION: 304 MOSM/KG (ref 278–305)
PDW BLD-RTO: 15.2 % (ref 11–15)
PLATELET # BLD: 310 10E3/UL (ref 140–400)
PMV BLD AUTO: 7.9 FL (ref 7.5–11.5)
POTASSIUM SERPL-SCNC: 4.6 MMOL/L (ref 3.5–5.3)
RBC # BLD: 3.56 10E6/UL (ref 4.2–5.8)
SAT: 17.9 % (ref 15–55)
SODIUM BLD-SCNC: 145 MMOL/L (ref 133–146)
TOTAL IRON BINDING CAPACITY: 396 UG/DL (ref 261–462)
TOTAL PROTEIN: 6.7 G/DL (ref 6.4–8.9)
WBC # BLD: 11.5 10E3/UL (ref 3.8–10.8)

## 2024-03-11 PROCEDURE — 1036F TOBACCO NON-USER: CPT | Performed by: CLINICAL NURSE SPECIALIST

## 2024-03-11 PROCEDURE — 99204 OFFICE O/P NEW MOD 45 MIN: CPT | Performed by: CLINICAL NURSE SPECIALIST

## 2024-03-11 PROCEDURE — G8420 CALC BMI NORM PARAMETERS: HCPCS | Performed by: CLINICAL NURSE SPECIALIST

## 2024-03-11 PROCEDURE — G8484 FLU IMMUNIZE NO ADMIN: HCPCS | Performed by: CLINICAL NURSE SPECIALIST

## 2024-03-11 PROCEDURE — 1123F ACP DISCUSS/DSCN MKR DOCD: CPT | Performed by: CLINICAL NURSE SPECIALIST

## 2024-03-11 PROCEDURE — G8427 DOCREV CUR MEDS BY ELIG CLIN: HCPCS | Performed by: CLINICAL NURSE SPECIALIST

## 2024-03-11 NOTE — PATIENT INSTRUCTIONS
Blood work today including iron studies  Schedule echo  Please call and schedule an appt with Dr Sandy  Continue all current medications for now  RTO in 1 month

## 2024-03-11 NOTE — PROGRESS NOTES
test 10/18/2021  Summary   The overall quality of the study is good. There is subdiaphragmatic   attenuation.      Left ventricular cavity size is normal at 81 ml. The right ventricular size   is normal.      SPECT images demonstrate homogeneous tracer distribution throughout the   myocardium. There is normal isotope uptake at stress and rest. There is no   evidence of myocardial ischemia or scar.      Sum stress score of 0. No visual TID. Calculated TID is 1.13.      Left ventricular ejection fraction is normal at 74%.      Myocardial perfusion is normal. Low risk scan.       Assessment:    1. Swelling    2. Coronary artery disease due to lipid rich plaque Dr Adhikari   3. Anemia, unspecified type    4. SOB (shortness of breath)    5. Interstitial nephritis Dr Sandy       Plan:   Patient Instructions   Blood work today including iron studies  Schedule echo  Please call and schedule an appt with Dr Sandy  Continue all current medications for now  RTO in 1 month    NYHA II    I appreciate the opportunity of cooperating in the care of this individual.    KING VILLA, JIM - CNS, CNS, 3/11/2024, 10:42 AM

## 2024-04-12 ENCOUNTER — PROCEDURE VISIT (OUTPATIENT)
Dept: CARDIOLOGY CLINIC | Age: 89
End: 2024-04-12

## 2024-04-12 DIAGNOSIS — R60.9 SWELLING: ICD-10-CM

## 2024-04-12 DIAGNOSIS — R06.02 SOB (SHORTNESS OF BREATH): ICD-10-CM

## 2024-04-17 ENCOUNTER — OFFICE VISIT (OUTPATIENT)
Dept: CARDIOLOGY CLINIC | Age: 89
End: 2024-04-17
Payer: MEDICARE

## 2024-04-17 VITALS
HEIGHT: 70 IN | BODY MASS INDEX: 20.47 KG/M2 | OXYGEN SATURATION: 97 % | SYSTOLIC BLOOD PRESSURE: 112 MMHG | HEART RATE: 80 BPM | DIASTOLIC BLOOD PRESSURE: 70 MMHG | WEIGHT: 143 LBS

## 2024-04-17 DIAGNOSIS — I25.83 CORONARY ARTERY DISEASE DUE TO LIPID RICH PLAQUE: ICD-10-CM

## 2024-04-17 DIAGNOSIS — E85.9 AMYLOIDOSIS, UNSPECIFIED TYPE (HCC): ICD-10-CM

## 2024-04-17 DIAGNOSIS — N12 INTERSTITIAL NEPHRITIS: ICD-10-CM

## 2024-04-17 DIAGNOSIS — I25.10 CORONARY ARTERY DISEASE DUE TO LIPID RICH PLAQUE: ICD-10-CM

## 2024-04-17 DIAGNOSIS — I50.32 CHRONIC DIASTOLIC HEART FAILURE (HCC): Primary | ICD-10-CM

## 2024-04-17 DIAGNOSIS — I10 ESSENTIAL HYPERTENSION: ICD-10-CM

## 2024-04-17 PROCEDURE — 1123F ACP DISCUSS/DSCN MKR DOCD: CPT | Performed by: CLINICAL NURSE SPECIALIST

## 2024-04-17 PROCEDURE — 99214 OFFICE O/P EST MOD 30 MIN: CPT | Performed by: CLINICAL NURSE SPECIALIST

## 2024-04-17 PROCEDURE — G8420 CALC BMI NORM PARAMETERS: HCPCS | Performed by: CLINICAL NURSE SPECIALIST

## 2024-04-17 PROCEDURE — G8427 DOCREV CUR MEDS BY ELIG CLIN: HCPCS | Performed by: CLINICAL NURSE SPECIALIST

## 2024-04-17 PROCEDURE — 1036F TOBACCO NON-USER: CPT | Performed by: CLINICAL NURSE SPECIALIST

## 2024-04-17 NOTE — PATIENT INSTRUCTIONS
Continue all current medications  Follow up with NPTS  Amyloidosis blood and urine testing  PYP for amyloidosis

## 2024-04-30 ENCOUNTER — HOSPITAL ENCOUNTER (OUTPATIENT)
Age: 89
Discharge: HOME | End: 2024-04-30
Payer: MEDICARE

## 2024-04-30 ENCOUNTER — HOSPITAL ENCOUNTER (OUTPATIENT)
Dept: NUCLEAR MEDICINE | Age: 89
Discharge: HOME | End: 2024-04-30
Payer: MEDICARE

## 2024-04-30 DIAGNOSIS — E85.9 AMYLOIDOSIS, UNSPECIFIED TYPE (HCC): ICD-10-CM

## 2024-04-30 LAB
PROT UR-MCNC: 0.02 G/DL
PROT UR-MCNC: 19 MG/DL

## 2024-04-30 PROCEDURE — 3430000000 HC RX DIAGNOSTIC RADIOPHARMACEUTICAL: Performed by: CLINICAL NURSE SPECIALIST

## 2024-04-30 PROCEDURE — 36415 COLL VENOUS BLD VENIPUNCTURE: CPT

## 2024-04-30 PROCEDURE — 84155 ASSAY OF PROTEIN SERUM: CPT

## 2024-04-30 PROCEDURE — 78803 RP LOCLZJ TUM SPECT 1 AREA: CPT

## 2024-04-30 PROCEDURE — A9538 TC99M PYROPHOSPHATE: HCPCS | Performed by: CLINICAL NURSE SPECIALIST

## 2024-04-30 PROCEDURE — 84156 ASSAY OF PROTEIN URINE: CPT

## 2024-04-30 PROCEDURE — 83521 IG LIGHT CHAINS FREE EACH: CPT

## 2024-04-30 PROCEDURE — 84165 PROTEIN E-PHORESIS SERUM: CPT

## 2024-04-30 PROCEDURE — 84166 PROTEIN E-PHORESIS/URINE/CSF: CPT

## 2024-04-30 RX ADMIN — Medication 14.73 MILLICURIE: at 11:37

## 2024-05-01 ENCOUNTER — TELEPHONE (OUTPATIENT)
Dept: CARDIOLOGY CLINIC | Age: 89
End: 2024-05-01

## 2024-05-01 LAB
ALBUMIN SERPL ELPH-MCNC: 3.4 G/DL (ref 3.1–4.9)
ALPHA1 GLOB SERPL ELPH-MCNC: 0.3 G/DL (ref 0.2–0.4)
ALPHA2 GLOB SERPL ELPH-MCNC: 0.8 G/DL (ref 0.4–1.1)
B-GLOBULIN SERPL ELPH-MCNC: 1 G/DL (ref 0.9–1.6)
GAMMA GLOB SERPL ELPH-MCNC: 0.9 G/DL (ref 0.6–1.8)
KAPPA LC FREE SER-MCNC: 39.85 MG/L (ref 3.3–19.4)
KAPPA LC FREE/LAMBDA FREE SER: 1.55 {RATIO} (ref 0.26–1.65)
LAMBDA LC FREE SERPL-MCNC: 25.72 MG/L (ref 5.71–26.3)
PROT PATTERN UR ELPH-IMP: NORMAL
PROT SERPL-MCNC: 6.3 G/DL (ref 6.4–8.2)
RPT COMMENT: ABNORMAL
SPE/IFE INTERPRETATION: NORMAL

## 2024-05-01 NOTE — TELEPHONE ENCOUNTER
----- Message from JIM Broussard CNP sent at 5/1/2024  8:43 AM EDT -----  Test for amyloid was negative. MATHEWV      Tried to reach patient LMOM about test results. Asked patient to call with any questions or concerns.

## 2024-08-30 ENCOUNTER — OFFICE VISIT (OUTPATIENT)
Dept: CARDIOLOGY CLINIC | Age: 89
End: 2024-08-30
Payer: MEDICARE

## 2024-08-30 VITALS
SYSTOLIC BLOOD PRESSURE: 104 MMHG | HEIGHT: 70 IN | OXYGEN SATURATION: 99 % | HEART RATE: 70 BPM | DIASTOLIC BLOOD PRESSURE: 72 MMHG | WEIGHT: 142 LBS | BODY MASS INDEX: 20.33 KG/M2

## 2024-08-30 DIAGNOSIS — I25.10 CORONARY ARTERY DISEASE DUE TO LIPID RICH PLAQUE: Primary | ICD-10-CM

## 2024-08-30 DIAGNOSIS — I25.83 CORONARY ARTERY DISEASE DUE TO LIPID RICH PLAQUE: Primary | ICD-10-CM

## 2024-08-30 DIAGNOSIS — I10 ESSENTIAL HYPERTENSION: ICD-10-CM

## 2024-08-30 DIAGNOSIS — E78.2 MIXED HYPERLIPIDEMIA: ICD-10-CM

## 2024-08-30 PROCEDURE — 99214 OFFICE O/P EST MOD 30 MIN: CPT | Performed by: NURSE PRACTITIONER

## 2024-08-30 PROCEDURE — G8420 CALC BMI NORM PARAMETERS: HCPCS | Performed by: NURSE PRACTITIONER

## 2024-08-30 PROCEDURE — 1036F TOBACCO NON-USER: CPT | Performed by: NURSE PRACTITIONER

## 2024-08-30 PROCEDURE — G8427 DOCREV CUR MEDS BY ELIG CLIN: HCPCS | Performed by: NURSE PRACTITIONER

## 2024-08-30 PROCEDURE — 1123F ACP DISCUSS/DSCN MKR DOCD: CPT | Performed by: NURSE PRACTITIONER

## 2024-08-30 RX ORDER — DONEPEZIL HYDROCHLORIDE 5 MG/1
5 TABLET, FILM COATED ORAL NIGHTLY
COMMUNITY
Start: 2024-08-13

## 2024-08-30 NOTE — PROGRESS NOTES
Sergio Newell.   Plan:     Continue present management   F/U in 9 months ; sees PCP in 6 months    Overall the patient is stable from CV standpoint    I have addresed the patient's cardiac risk factors and adjusted pharmacologic treatment as needed. In addition, I have reinforced the need for patient directed risk factor modification.    Further evaluation will be based upon the patient's clinical course and testing results.    All questions and concerns were addressed to the patient/family. Alternatives to my treatment were discussed.      The patient is not currently smoking.     Patient is on a beta-blocker  Patient is on an ace-i/ARB/ARNI/SGLT2/GLP1/MRA : CKD stage III-IV d/t lithium > interstitial nephritis   Patient is on a statin    Antiplatelet therapy has been recommended / prescribed for this patient. Education conducted on adverse reactions including bleeding was discussed.    The patient verbalizes understanding not to stop medications without discussing with us.    Discussed exercise: 30-60 minutes 7 days/week : 2 x / week at the [facility] ~ 60 minutes  Discussed Low saturated fat diet.  Est fluid intake ~ 2 L     Thank you for allowing to us to participate in the care of Sergio Newell.    JIM Interiano    Documentation of today's visit sent to PCP

## 2025-02-25 ENCOUNTER — APPOINTMENT (OUTPATIENT)
Dept: CT IMAGING | Age: 89
DRG: 683 | End: 2025-02-25
Payer: MEDICARE

## 2025-02-25 ENCOUNTER — APPOINTMENT (OUTPATIENT)
Dept: GENERAL RADIOLOGY | Age: 89
DRG: 683 | End: 2025-02-25
Payer: MEDICARE

## 2025-02-25 ENCOUNTER — HOSPITAL ENCOUNTER (INPATIENT)
Age: 89
LOS: 4 days | Discharge: NURSING, REHAB OR LONG-TERM CARE | DRG: 683 | End: 2025-03-02
Attending: EMERGENCY MEDICINE | Admitting: STUDENT IN AN ORGANIZED HEALTH CARE EDUCATION/TRAINING PROGRAM
Payer: MEDICARE

## 2025-02-25 DIAGNOSIS — E87.0 HYPERNATREMIA: ICD-10-CM

## 2025-02-25 DIAGNOSIS — J10.1 INFLUENZA A: ICD-10-CM

## 2025-02-25 DIAGNOSIS — G93.40 ACUTE ENCEPHALOPATHY: Primary | ICD-10-CM

## 2025-02-25 DIAGNOSIS — N17.9 ACUTE KIDNEY INJURY SUPERIMPOSED ON CKD: ICD-10-CM

## 2025-02-25 DIAGNOSIS — R79.89 ELEVATED TROPONIN: ICD-10-CM

## 2025-02-25 DIAGNOSIS — R79.89 ELEVATED BRAIN NATRIURETIC PEPTIDE (BNP) LEVEL: ICD-10-CM

## 2025-02-25 DIAGNOSIS — E83.41 HYPERMAGNESEMIA: ICD-10-CM

## 2025-02-25 DIAGNOSIS — E87.8 HYPERCHLOREMIA: ICD-10-CM

## 2025-02-25 DIAGNOSIS — N18.9 ACUTE KIDNEY INJURY SUPERIMPOSED ON CKD: ICD-10-CM

## 2025-02-25 LAB
ALBUMIN SERPL-MCNC: 3.5 G/DL (ref 3.4–5)
ALBUMIN/GLOB SERPL: 0.9 {RATIO} (ref 1.1–2.2)
ALP SERPL-CCNC: 99 U/L (ref 40–129)
ALT SERPL-CCNC: 19 U/L (ref 10–40)
ANION GAP SERPL CALCULATED.3IONS-SCNC: 13 MMOL/L (ref 3–16)
AST SERPL-CCNC: 23 U/L (ref 15–37)
BASE EXCESS BLDV CALC-SCNC: -2.2 MMOL/L (ref -3–3)
BASOPHILS # BLD: 0.1 K/UL (ref 0–0.2)
BASOPHILS NFR BLD: 1 %
BILIRUB SERPL-MCNC: <0.2 MG/DL (ref 0–1)
BUN SERPL-MCNC: 25 MG/DL (ref 7–20)
CALCIUM SERPL-MCNC: 9.8 MG/DL (ref 8.3–10.6)
CHLORIDE SERPL-SCNC: 113 MMOL/L (ref 99–110)
CO2 BLDV-SCNC: 57 MMOL/L
CO2 SERPL-SCNC: 22 MMOL/L (ref 21–32)
COHGB MFR BLDV: 2.8 % (ref 0–1.5)
CREAT SERPL-MCNC: 2.3 MG/DL (ref 0.8–1.3)
DEPRECATED RDW RBC AUTO: 15.2 % (ref 12.4–15.4)
DO-HGB MFR BLDV: 27 %
EOSINOPHIL # BLD: 0 K/UL (ref 0–0.6)
EOSINOPHIL NFR BLD: 0 %
FLUAV RNA RESP QL NAA+PROBE: DETECTED
FLUBV RNA RESP QL NAA+PROBE: NOT DETECTED
GFR SERPLBLD CREATININE-BSD FMLA CKD-EPI: 27 ML/MIN/{1.73_M2}
GLUCOSE SERPL-MCNC: 111 MG/DL (ref 70–99)
HCO3 BLDV-SCNC: 23.9 MMOL/L (ref 23–29)
HCT VFR BLD AUTO: 35.3 % (ref 40.5–52.5)
HGB BLD-MCNC: 11.4 G/DL (ref 13.5–17.5)
LACTATE BLDV-SCNC: 1.6 MMOL/L (ref 0.4–1.9)
LIPASE SERPL-CCNC: 33 U/L (ref 13–60)
LITHIUM DOSE: NORMAL MG
LITHIUM SERPL-MCNC: 0.8 MMOL/L (ref 0.6–1.2)
LYMPHOCYTES # BLD: 0.9 K/UL (ref 1–5.1)
LYMPHOCYTES NFR BLD: 6 %
MAGNESIUM SERPL-MCNC: 2.77 MG/DL (ref 1.8–2.4)
MCH RBC QN AUTO: 30.6 PG (ref 26–34)
MCHC RBC AUTO-ENTMCNC: 32.4 G/DL (ref 31–36)
MCV RBC AUTO: 94.4 FL (ref 80–100)
METAMYELOCYTES NFR BLD MANUAL: 2 %
METHGB MFR BLDV: 0.5 %
MONOCYTES # BLD: 0.6 K/UL (ref 0–1.3)
MONOCYTES NFR BLD: 4 %
NEUTROPHILS # BLD: 13.3 K/UL (ref 1.7–7.7)
NEUTROPHILS NFR BLD: 84 %
NEUTS BAND NFR BLD MANUAL: 3 % (ref 0–7)
NT-PROBNP SERPL-MCNC: 2870 PG/ML (ref 0–449)
O2 CT VFR BLDV CALC: 10 VOL %
O2 THERAPY: ABNORMAL
OVALOCYTES BLD QL SMEAR: ABNORMAL
PCO2 BLDV: 45.6 MMHG (ref 40–50)
PH BLDV: 7.33 [PH] (ref 7.35–7.45)
PLATELET # BLD AUTO: 327 K/UL (ref 135–450)
PLATELET BLD QL SMEAR: ADEQUATE
PMV BLD AUTO: 8.2 FL (ref 5–10.5)
PO2 BLDV: 40.7 MMHG (ref 25–40)
POTASSIUM SERPL-SCNC: 4.5 MMOL/L (ref 3.5–5.1)
PROCALCITONIN SERPL IA-MCNC: 0.34 NG/ML (ref 0–0.15)
PROT SERPL-MCNC: 7.2 G/DL (ref 6.4–8.2)
RBC # BLD AUTO: 3.74 M/UL (ref 4.2–5.9)
SAO2 % BLDV: 72 %
SARS-COV-2 RNA RESP QL NAA+PROBE: NOT DETECTED
SCHISTOCYTES BLD QL SMEAR: ABNORMAL
SLIDE REVIEW: ABNORMAL
SODIUM SERPL-SCNC: 148 MMOL/L (ref 136–145)
TROPONIN, HIGH SENSITIVITY: 51 NG/L (ref 0–22)
TROPONIN, HIGH SENSITIVITY: 52 NG/L (ref 0–22)
TSH SERPL DL<=0.005 MIU/L-ACNC: 1.05 UIU/ML (ref 0.27–4.2)
WBC # BLD AUTO: 14.9 K/UL (ref 4–11)

## 2025-02-25 PROCEDURE — 71045 X-RAY EXAM CHEST 1 VIEW: CPT

## 2025-02-25 PROCEDURE — 99285 EMERGENCY DEPT VISIT HI MDM: CPT

## 2025-02-25 PROCEDURE — 2580000003 HC RX 258: Performed by: PHYSICIAN ASSISTANT

## 2025-02-25 PROCEDURE — 80053 COMPREHEN METABOLIC PANEL: CPT

## 2025-02-25 PROCEDURE — 87636 SARSCOV2 & INF A&B AMP PRB: CPT

## 2025-02-25 PROCEDURE — 83880 ASSAY OF NATRIURETIC PEPTIDE: CPT

## 2025-02-25 PROCEDURE — 80175 DRUG SCREEN QUAN LAMOTRIGINE: CPT

## 2025-02-25 PROCEDURE — 70450 CT HEAD/BRAIN W/O DYE: CPT

## 2025-02-25 PROCEDURE — 93005 ELECTROCARDIOGRAM TRACING: CPT | Performed by: PHYSICIAN ASSISTANT

## 2025-02-25 PROCEDURE — 84443 ASSAY THYROID STIM HORMONE: CPT

## 2025-02-25 PROCEDURE — 80178 ASSAY OF LITHIUM: CPT

## 2025-02-25 PROCEDURE — 36415 COLL VENOUS BLD VENIPUNCTURE: CPT

## 2025-02-25 PROCEDURE — 6370000000 HC RX 637 (ALT 250 FOR IP): Performed by: PHYSICIAN ASSISTANT

## 2025-02-25 PROCEDURE — 81001 URINALYSIS AUTO W/SCOPE: CPT

## 2025-02-25 PROCEDURE — 85025 COMPLETE CBC W/AUTO DIFF WBC: CPT

## 2025-02-25 PROCEDURE — 83735 ASSAY OF MAGNESIUM: CPT

## 2025-02-25 PROCEDURE — 82803 BLOOD GASES ANY COMBINATION: CPT

## 2025-02-25 PROCEDURE — 87040 BLOOD CULTURE FOR BACTERIA: CPT

## 2025-02-25 PROCEDURE — 83690 ASSAY OF LIPASE: CPT

## 2025-02-25 PROCEDURE — 83605 ASSAY OF LACTIC ACID: CPT

## 2025-02-25 PROCEDURE — 84145 PROCALCITONIN (PCT): CPT

## 2025-02-25 PROCEDURE — 84484 ASSAY OF TROPONIN QUANT: CPT

## 2025-02-25 RX ORDER — ACETAMINOPHEN 500 MG
1000 TABLET ORAL ONCE
Status: COMPLETED | OUTPATIENT
Start: 2025-02-25 | End: 2025-02-25

## 2025-02-25 RX ORDER — SODIUM CHLORIDE, SODIUM LACTATE, POTASSIUM CHLORIDE, AND CALCIUM CHLORIDE .6; .31; .03; .02 G/100ML; G/100ML; G/100ML; G/100ML
1000 INJECTION, SOLUTION INTRAVENOUS ONCE
Status: COMPLETED | OUTPATIENT
Start: 2025-02-25 | End: 2025-02-26

## 2025-02-25 RX ADMIN — ACETAMINOPHEN 1000 MG: 500 TABLET ORAL at 21:52

## 2025-02-25 RX ADMIN — SODIUM CHLORIDE, POTASSIUM CHLORIDE, SODIUM LACTATE AND CALCIUM CHLORIDE 1000 ML: 600; 310; 30; 20 INJECTION, SOLUTION INTRAVENOUS at 22:22

## 2025-02-25 ASSESSMENT — PAIN - FUNCTIONAL ASSESSMENT: PAIN_FUNCTIONAL_ASSESSMENT: NONE - DENIES PAIN

## 2025-02-26 PROBLEM — J10.1 INFLUENZA A: Status: ACTIVE | Noted: 2025-02-26

## 2025-02-26 LAB
ANION GAP SERPL CALCULATED.3IONS-SCNC: 11 MMOL/L (ref 3–16)
ANION GAP SERPL CALCULATED.3IONS-SCNC: 14 MMOL/L (ref 3–16)
BACTERIA URNS QL MICRO: ABNORMAL /HPF
BILIRUB UR QL STRIP.AUTO: NEGATIVE
BUN SERPL-MCNC: 30 MG/DL (ref 7–20)
BUN SERPL-MCNC: 33 MG/DL (ref 7–20)
CALCIUM SERPL-MCNC: 9.2 MG/DL (ref 8.3–10.6)
CALCIUM SERPL-MCNC: 9.4 MG/DL (ref 8.3–10.6)
CHLORIDE SERPL-SCNC: 117 MMOL/L (ref 99–110)
CHLORIDE SERPL-SCNC: 119 MMOL/L (ref 99–110)
CLARITY UR: CLEAR
CO2 SERPL-SCNC: 20 MMOL/L (ref 21–32)
CO2 SERPL-SCNC: 20 MMOL/L (ref 21–32)
COLOR UR: YELLOW
CREAT SERPL-MCNC: 2.3 MG/DL (ref 0.8–1.3)
CREAT SERPL-MCNC: 2.4 MG/DL (ref 0.8–1.3)
DEPRECATED RDW RBC AUTO: 14.7 % (ref 12.4–15.4)
EKG ATRIAL RATE: 86 BPM
EKG DIAGNOSIS: NORMAL
EKG P AXIS: 60 DEGREES
EKG P-R INTERVAL: 204 MS
EKG Q-T INTERVAL: 392 MS
EKG QRS DURATION: 94 MS
EKG QTC CALCULATION (BAZETT): 469 MS
EKG R AXIS: -36 DEGREES
EKG T AXIS: 69 DEGREES
EKG VENTRICULAR RATE: 86 BPM
EPI CELLS #/AREA URNS AUTO: 1 /HPF (ref 0–5)
GFR SERPLBLD CREATININE-BSD FMLA CKD-EPI: 25 ML/MIN/{1.73_M2}
GFR SERPLBLD CREATININE-BSD FMLA CKD-EPI: 27 ML/MIN/{1.73_M2}
GLUCOSE SERPL-MCNC: 106 MG/DL (ref 70–99)
GLUCOSE SERPL-MCNC: 118 MG/DL (ref 70–99)
GLUCOSE UR STRIP.AUTO-MCNC: NEGATIVE MG/DL
HCT VFR BLD AUTO: 32.7 % (ref 40.5–52.5)
HGB BLD-MCNC: 10.6 G/DL (ref 13.5–17.5)
HGB UR QL STRIP.AUTO: ABNORMAL
HYALINE CASTS #/AREA URNS AUTO: 1 /LPF (ref 0–8)
KETONES UR STRIP.AUTO-MCNC: NEGATIVE MG/DL
LEUKOCYTE ESTERASE UR QL STRIP.AUTO: ABNORMAL
MCH RBC QN AUTO: 30.5 PG (ref 26–34)
MCHC RBC AUTO-ENTMCNC: 32.3 G/DL (ref 31–36)
MCV RBC AUTO: 94.5 FL (ref 80–100)
NITRITE UR QL STRIP.AUTO: NEGATIVE
PH UR STRIP.AUTO: 6.5 [PH] (ref 5–8)
PLATELET # BLD AUTO: 253 K/UL (ref 135–450)
PMV BLD AUTO: 8.1 FL (ref 5–10.5)
POTASSIUM SERPL-SCNC: 4.5 MMOL/L (ref 3.5–5.1)
POTASSIUM SERPL-SCNC: 4.9 MMOL/L (ref 3.5–5.1)
PROCALCITONIN SERPL IA-MCNC: 0.28 NG/ML (ref 0–0.15)
PROT UR STRIP.AUTO-MCNC: 100 MG/DL
RBC # BLD AUTO: 3.46 M/UL (ref 4.2–5.9)
RBC CLUMPS #/AREA URNS AUTO: 10 /HPF (ref 0–4)
REASON FOR REJECTION: NORMAL
REJECTED TEST: NORMAL
SODIUM SERPL-SCNC: 150 MMOL/L (ref 136–145)
SODIUM SERPL-SCNC: 151 MMOL/L (ref 136–145)
SP GR UR STRIP.AUTO: 1.01 (ref 1–1.03)
UA COMPLETE W REFLEX CULTURE PNL UR: ABNORMAL
UA DIPSTICK W REFLEX MICRO PNL UR: YES
URN SPEC COLLECT METH UR: ABNORMAL
UROBILINOGEN UR STRIP-ACNC: 0.2 E.U./DL
WBC # BLD AUTO: 14.8 K/UL (ref 4–11)
WBC #/AREA URNS AUTO: 5 /HPF (ref 0–5)

## 2025-02-26 PROCEDURE — 1200000000 HC SEMI PRIVATE

## 2025-02-26 PROCEDURE — 2580000003 HC RX 258: Performed by: STUDENT IN AN ORGANIZED HEALTH CARE EDUCATION/TRAINING PROGRAM

## 2025-02-26 PROCEDURE — 2580000003 HC RX 258: Performed by: NURSE PRACTITIONER

## 2025-02-26 PROCEDURE — 6370000000 HC RX 637 (ALT 250 FOR IP): Performed by: STUDENT IN AN ORGANIZED HEALTH CARE EDUCATION/TRAINING PROGRAM

## 2025-02-26 PROCEDURE — 6360000002 HC RX W HCPCS: Performed by: NURSE PRACTITIONER

## 2025-02-26 PROCEDURE — 36415 COLL VENOUS BLD VENIPUNCTURE: CPT

## 2025-02-26 PROCEDURE — 2500000003 HC RX 250 WO HCPCS: Performed by: NURSE PRACTITIONER

## 2025-02-26 PROCEDURE — 85027 COMPLETE CBC AUTOMATED: CPT

## 2025-02-26 PROCEDURE — 84145 PROCALCITONIN (PCT): CPT

## 2025-02-26 PROCEDURE — 80048 BASIC METABOLIC PNL TOTAL CA: CPT

## 2025-02-26 PROCEDURE — 6370000000 HC RX 637 (ALT 250 FOR IP): Performed by: NURSE PRACTITIONER

## 2025-02-26 PROCEDURE — 93010 ELECTROCARDIOGRAM REPORT: CPT | Performed by: INTERNAL MEDICINE

## 2025-02-26 RX ORDER — SODIUM CHLORIDE 0.9 % (FLUSH) 0.9 %
5-40 SYRINGE (ML) INJECTION PRN
Status: DISCONTINUED | OUTPATIENT
Start: 2025-02-26 | End: 2025-03-02 | Stop reason: HOSPADM

## 2025-02-26 RX ORDER — ACETAMINOPHEN 325 MG/1
650 TABLET ORAL EVERY 6 HOURS PRN
Status: DISCONTINUED | OUTPATIENT
Start: 2025-02-26 | End: 2025-03-02 | Stop reason: HOSPADM

## 2025-02-26 RX ORDER — DONEPEZIL HYDROCHLORIDE 5 MG/1
10 TABLET, FILM COATED ORAL NIGHTLY
Status: DISCONTINUED | OUTPATIENT
Start: 2025-02-26 | End: 2025-03-02 | Stop reason: HOSPADM

## 2025-02-26 RX ORDER — HEPARIN SODIUM 5000 [USP'U]/ML
5000 INJECTION, SOLUTION INTRAVENOUS; SUBCUTANEOUS EVERY 8 HOURS SCHEDULED
Status: DISCONTINUED | OUTPATIENT
Start: 2025-02-26 | End: 2025-03-02 | Stop reason: HOSPADM

## 2025-02-26 RX ORDER — ALLOPURINOL 100 MG/1
50 TABLET ORAL DAILY
Status: DISCONTINUED | OUTPATIENT
Start: 2025-02-26 | End: 2025-03-02 | Stop reason: HOSPADM

## 2025-02-26 RX ORDER — LANOLIN ALCOHOL/MO/W.PET/CERES
1000 CREAM (GRAM) TOPICAL EVERY EVENING
Status: DISCONTINUED | OUTPATIENT
Start: 2025-02-26 | End: 2025-03-02 | Stop reason: HOSPADM

## 2025-02-26 RX ORDER — ONDANSETRON 2 MG/ML
4 INJECTION INTRAMUSCULAR; INTRAVENOUS EVERY 6 HOURS PRN
Status: DISCONTINUED | OUTPATIENT
Start: 2025-02-26 | End: 2025-03-02 | Stop reason: HOSPADM

## 2025-02-26 RX ORDER — DEXTROSE AND SODIUM CHLORIDE 5; .2 G/100ML; G/100ML
INJECTION, SOLUTION INTRAVENOUS CONTINUOUS
Status: DISCONTINUED | OUTPATIENT
Start: 2025-02-26 | End: 2025-02-27

## 2025-02-26 RX ORDER — SODIUM CHLORIDE 9 MG/ML
INJECTION, SOLUTION INTRAVENOUS PRN
Status: DISCONTINUED | OUTPATIENT
Start: 2025-02-26 | End: 2025-03-02 | Stop reason: HOSPADM

## 2025-02-26 RX ORDER — SODIUM CHLORIDE 9 MG/ML
INJECTION, SOLUTION INTRAVENOUS CONTINUOUS
Status: ACTIVE | OUTPATIENT
Start: 2025-02-26 | End: 2025-02-26

## 2025-02-26 RX ORDER — ACETAMINOPHEN 650 MG/1
650 SUPPOSITORY RECTAL EVERY 6 HOURS PRN
Status: DISCONTINUED | OUTPATIENT
Start: 2025-02-26 | End: 2025-03-02 | Stop reason: HOSPADM

## 2025-02-26 RX ORDER — PRAVASTATIN SODIUM 20 MG
20 TABLET ORAL EVERY EVENING
Status: DISCONTINUED | OUTPATIENT
Start: 2025-02-26 | End: 2025-03-02 | Stop reason: HOSPADM

## 2025-02-26 RX ORDER — LITHIUM CARBONATE 300 MG/1
300 CAPSULE ORAL DAILY
Status: DISCONTINUED | OUTPATIENT
Start: 2025-02-26 | End: 2025-03-01

## 2025-02-26 RX ORDER — CEFDINIR 300 MG/1
300 CAPSULE ORAL DAILY
Status: DISCONTINUED | OUTPATIENT
Start: 2025-02-26 | End: 2025-03-02 | Stop reason: HOSPADM

## 2025-02-26 RX ORDER — ASPIRIN 81 MG/1
81 TABLET ORAL DAILY
Status: DISCONTINUED | OUTPATIENT
Start: 2025-02-26 | End: 2025-03-02 | Stop reason: HOSPADM

## 2025-02-26 RX ORDER — SODIUM CHLORIDE 0.9 % (FLUSH) 0.9 %
5-40 SYRINGE (ML) INJECTION EVERY 12 HOURS SCHEDULED
Status: DISCONTINUED | OUTPATIENT
Start: 2025-02-26 | End: 2025-03-02 | Stop reason: HOSPADM

## 2025-02-26 RX ORDER — POLYETHYLENE GLYCOL 3350 17 G/17G
17 POWDER, FOR SOLUTION ORAL DAILY PRN
Status: DISCONTINUED | OUTPATIENT
Start: 2025-02-26 | End: 2025-03-02 | Stop reason: HOSPADM

## 2025-02-26 RX ORDER — LEVOTHYROXINE SODIUM 25 UG/1
25 TABLET ORAL DAILY
Status: DISCONTINUED | OUTPATIENT
Start: 2025-02-26 | End: 2025-03-02 | Stop reason: HOSPADM

## 2025-02-26 RX ORDER — LAMOTRIGINE 100 MG/1
200 TABLET ORAL DAILY
Status: DISCONTINUED | OUTPATIENT
Start: 2025-02-26 | End: 2025-03-02 | Stop reason: HOSPADM

## 2025-02-26 RX ORDER — ONDANSETRON 4 MG/1
4 TABLET, ORALLY DISINTEGRATING ORAL EVERY 8 HOURS PRN
Status: DISCONTINUED | OUTPATIENT
Start: 2025-02-26 | End: 2025-03-02 | Stop reason: HOSPADM

## 2025-02-26 RX ORDER — OSELTAMIVIR PHOSPHATE 30 MG/1
30 CAPSULE ORAL DAILY
Status: COMPLETED | OUTPATIENT
Start: 2025-02-26 | End: 2025-03-02

## 2025-02-26 RX ADMIN — OSELTAMIVIR 30 MG: 30 CAPSULE ORAL at 15:33

## 2025-02-26 RX ADMIN — DEXTROSE AND SODIUM CHLORIDE: 5; 200 INJECTION, SOLUTION INTRAVENOUS at 11:57

## 2025-02-26 RX ADMIN — ACETAMINOPHEN 650 MG: 650 SUPPOSITORY RECTAL at 17:37

## 2025-02-26 RX ADMIN — DEXTROSE AND SODIUM CHLORIDE: 5; 200 INJECTION, SOLUTION INTRAVENOUS at 21:30

## 2025-02-26 RX ADMIN — ASPIRIN 81 MG: 81 TABLET, COATED ORAL at 09:58

## 2025-02-26 RX ADMIN — LEVOTHYROXINE SODIUM 25 MCG: 0.03 TABLET ORAL at 09:57

## 2025-02-26 RX ADMIN — SODIUM CHLORIDE: 9 INJECTION, SOLUTION INTRAVENOUS at 02:24

## 2025-02-26 RX ADMIN — LAMOTRIGINE 200 MG: 100 TABLET ORAL at 09:58

## 2025-02-26 RX ADMIN — HEPARIN SODIUM 5000 UNITS: 5000 INJECTION INTRAVENOUS; SUBCUTANEOUS at 06:47

## 2025-02-26 RX ADMIN — HEPARIN SODIUM 5000 UNITS: 5000 INJECTION INTRAVENOUS; SUBCUTANEOUS at 15:33

## 2025-02-26 RX ADMIN — CEFDINIR 300 MG: 300 CAPSULE ORAL at 09:58

## 2025-02-26 RX ADMIN — ALLOPURINOL 50 MG: 100 TABLET ORAL at 09:57

## 2025-02-26 RX ADMIN — LITHIUM CARBONATE 300 MG: 300 CAPSULE ORAL at 09:57

## 2025-02-26 RX ADMIN — Medication 10 ML: at 09:58

## 2025-02-26 RX ADMIN — HEPARIN SODIUM 5000 UNITS: 5000 INJECTION INTRAVENOUS; SUBCUTANEOUS at 21:33

## 2025-02-26 NOTE — PROGRESS NOTES
Shift assessment completed. Routine vitals stable. Scheduled medications given. Patient is awake, alert and oriented. Respirations are fast and shallow. Patient does not appear to be in distress, resting comfortably at this time. Call light within reach.

## 2025-02-26 NOTE — H&P
V2.0  History and Physical      Name:  Sergio Newell /Age/Sex: 1935  (89 y.o. male)   MRN & CSN:  [id number] & [id number] Encounter Date/Time: 2025 1:47 AM EST   Location:  ED- PCP: Anabell Bronson MD       Hospital Day: 2    Assessment and Plan:   Sergio Newell is a 89 y.o. male with a pmh of hyperlipidemia, hypertension, CAD, bipolar, dementia, history of syncope and CKD.  He discharged from [facility] on  for following acute hospitalization for pneumonia.  He was placed on azithromycin and Rocephin and transition to oralcefpodoxine at discharge.  He presents today for evaluation of worsening generalized weakness, fatigue, drowsiness and inability to walk since his discharge home.  He is found to have influenza A, ESTEBAN and possible dehydration.     Hospital Problems             Last Modified POA    * (Principal) Influenza A 2025 Yes       Plan:  # Influenza A infection  # Generalized weakness and fatigue  # Impaired mobility  -Chest x-ray negative.  -Admit with telemetry.  -Symptomatic management.  -Consult PT and OT.    # Recent discharge on  for for for pneumonia.  # Leukocytosis likely due to above.  WBC of 14.9.  -Continue his outpatient  cefpodoxime x3days. Substituted for cefdinir here.    # ESTEBAN on CKD 3.  Creatinine of 2.3.  Baseline of 1.8.  Likely due to volume depletion.  Given bolus fluids in the ED.  Continue with maintenance fluids and monitor renal function.  Avoid nephrotoxins.    # Chronic suprapubic catheter.  Routine catheter care per protocol.    # Hypernatremia.  Sodium of 148.  Hypovolemic hypernatremia.  Continue IV fluids and trend sodium.    # Chronically elevated troponin.  Troponin around his baseline troponin in 50s.  No reported chest pain.  No acute ischemia on EKG.  Continue telemetry and trend troponin.    # Dementia.  # Intermittent confusion-Per family report.  -He is oriented x 4 at time of this evaluation.  -Supportive care.    #  tissues.     No acute intracranial abnormality.     XR CHEST PORTABLE    Result Date: 2/25/2025  EXAMINATION: ONE XRAY VIEW OF THE CHEST 2/25/2025 7:06 pm COMPARISON: 11/04/2023. HISTORY: ORDERING SYSTEM PROVIDED HISTORY: SOB TECHNOLOGIST PROVIDED HISTORY: Reason for exam:->SOB Reason for Exam: SOB FINDINGS: The cardiomediastinal silhouette is stable status post median sternotomy. The lungs are clear.  No infiltrate, pleural fluid or evidence of overt failure.     No acute cardiopulmonary disease.         Electronically signed by JIM Whalen CNP on 2/26/2025 at 1:47 AM    IGERMAN MD  have personally performed a face to face diagnostic evaluation on this patient. I have interviewed and examined the patient and I agree with the assessment above. Time was spent reviewing patient chart (including but not limited to notes, labs, imaging and other testing), interviewing and counseling patient and present family members, performing physical exam, documentation of my findings and subsequent follow up of ordered medication and testing, placing referrals and communication with patient care providers, coordinating future care as well as documentation in the EHR . This encompassed more than 50% of the total encounter time. In summary, my findings and plan are the following:      Sub: pt appears weak and confused, wife at bedside, report he had been home only a day and became progressively weaker    Objective  General : sleepy, appear chronically ill  Resp: weak ins effort, clear  Abd: soft , non tender    Hypernatremia  #Diabetes insipidus  #ESTEBAN  Na high 151, cont ivf. Start d51/4 NS  Nephro consulted,    #Influenza: no evidence of pna. Start tamiflu    #weakness : PT/PT

## 2025-02-26 NOTE — ED PROVIDER NOTES
[facility] EMERGENCY DEPARTMENT  EMERGENCY DEPARTMENT ENCOUNTER        Pt Name: Sergio Newell  MRN: [id number]  Birthdate 1935  Date of evaluation: 2/25/2025  Provider: Darius Beckford PA-C  PCP: Anabell Bronson MD  Note Started: 7:02 PM EST 2/25/25       I have seen and evaluated this patient with my supervising physician Laureano Cleary MD.      CHIEF COMPLAINT       Chief Complaint   Patient presents with    Fatigue     Patient recent admitted via PNA and discharged yesterday; increased work of breath but feels weak. Patient brought by [facility]        HISTORY OF PRESENT ILLNESS: 1 or more Elements     History From: Patient & family    Limitations to history : Generalized weakness and fatigue    Social Determinants Significantly Affecting Health : None    Chief Complaint: Generalized weakness and fatigue    Sergio Newell is a 89 y.o. male with a history of hypertension, hyperlipidemia, CAD, CKD, diabetes insipidus, thyroid disease and bipolar disorder on lithium and Lamictal who presents to the emergency department today via ambulance from home with wife and daughter with report of generalized weakness and fatigue.  Patient was discharged from [facility] yesterday after being admitted on Friday for pneumonia.  He was discharged on Vantin and Tessalon Perl.  Family states since coming home he has become severely weak and fatigued.  He now will not walk.  Wife believes he is even confused at times.  Patient is alert and oriented x 3 with GCS 15 on arrival.  He does report worsening generalized weakness and fatigue.  He continues closing his eyes and nodding off.  Patient states he has had mild chest pain since his cough started last week but denies any change with this.  He denies feeling short of breath or having heart palpitations or diaphoresis.  He does feel his cough is improving.  He has had no reported fevers at home and temperature is 99.3  INFLUENZA COMBO - Abnormal; Notable for the following components:       Result Value    Influenza A DETECTED (*)     All other components within normal limits   CBC WITH AUTO DIFFERENTIAL - Abnormal; Notable for the following components:    WBC 14.9 (*)     RBC 3.74 (*)     Hemoglobin 11.4 (*)     Hematocrit 35.3 (*)     Neutrophils Absolute 13.3 (*)     Lymphocytes Absolute 0.9 (*)     Metamyelocytes Relative 2 (*)     Schistocytes Occasional (*)     Ovalocytes Occasional (*)     All other components within normal limits   COMPREHENSIVE METABOLIC PANEL - Abnormal; Notable for the following components:    Sodium 148 (*)     Chloride 113 (*)     Glucose 111 (*)     BUN 25 (*)     Creatinine 2.3 (*)     Est, Glom Filt Rate 27 (*)     Albumin/Globulin Ratio 0.9 (*)     All other components within normal limits   URINALYSIS WITH REFLEX TO CULTURE - Abnormal; Notable for the following components:    Blood, Urine LARGE (*)     Protein,  (*)     Leukocyte Esterase, Urine TRACE (*)     All other components within normal limits   TROPONIN - Abnormal; Notable for the following components:    Troponin, High Sensitivity 52 (*)     All other components within normal limits   TROPONIN - Abnormal; Notable for the following components:    Troponin, High Sensitivity 51 (*)     All other components within normal limits   BRAIN NATRIURETIC PEPTIDE - Abnormal; Notable for the following components:    NT Pro-BNP 2,870 (*)     All other components within normal limits   BLOOD GAS, VENOUS - Abnormal; Notable for the following components:    pH, Garfield 7.328 (*)     PO2, Garfield 40.7 (*)     Carboxyhemoglobin 2.8 (*)     All other components within normal limits   PROCALCITONIN - Abnormal; Notable for the following components:    Procalcitonin 0.34 (*)     All other components within normal limits   MAGNESIUM - Abnormal; Notable for the following components:    Magnesium 2.77 (*)     All other components within normal limits   MICROSCOPIC

## 2025-02-26 NOTE — PLAN OF CARE
Problem: Skin/Tissue Integrity  Goal: Skin integrity remains intact  Description: 1.  Monitor for areas of redness and/or skin breakdown  2.  Assess vascular access sites hourly  3.  Every 4-6 hours minimum:  Change oxygen saturation probe site  4.  Every 4-6 hours:  If on nasal continuous positive airway pressure, respiratory therapy assess nares and determine need for appliance change or resting period  Outcome: Progressing     Problem: Safety - Adult  Goal: Free from fall injury  Outcome: Progressing     Problem: ABCDS Injury Assessment  Goal: Absence of physical injury  Outcome: Progressing     Problem: Neurosensory - Adult  Goal: Achieves stable or improved neurological status  Outcome: Progressing  Goal: Achieves maximal functionality and self care  Outcome: Progressing     Problem: Respiratory - Adult  Goal: Achieves optimal ventilation and oxygenation  Outcome: Progressing     Problem: Genitourinary - Adult  Goal: Absence of urinary retention  Outcome: Progressing  Goal: Urinary catheter remains patent  Outcome: Progressing     Problem: Infection - Adult  Goal: Absence of infection at discharge  Outcome: Progressing     Problem: Metabolic/Fluid and Electrolytes - Adult  Goal: Electrolytes maintained within normal limits  Outcome: Progressing     Problem: Skin/Tissue Integrity - Adult  Goal: Skin integrity remains intact  Description: 1.  Monitor for areas of redness and/or skin breakdown  2.  Assess vascular access sites hourly  3.  Every 4-6 hours minimum:  Change oxygen saturation probe site  4.  Every 4-6 hours:  If on nasal continuous positive airway pressure, respiratory therapy assess nares and determine need for appliance change or resting period  Outcome: Progressing     Problem: Musculoskeletal - Adult  Goal: Return mobility to safest level of function  Outcome: Progressing

## 2025-02-26 NOTE — ED PROVIDER NOTES
I have personally performed a face to face diagnostic evaluation on this patient. I have fully participated in the care of this patient I personally saw the patient and performed a substantive portion of the visit including all aspects of the medical decision making.  I have reviewed and agree with all pertinent clinical information including history, physical exam, diagnostic tests, and the plan.      HPI: Sergio Newell presented with generalized fatigue, weakness, altered mental status, increased work of breathing, recently admitted for pneumonia brought in via EMS.  History of CAD CKD hyperlipidemia hypertension diabetes thyroid disease bipolar disorder.  Mild chest pain and cough since last week.  No shortness of breath.  Patient appears mildly confused.  Chief Complaint   Patient presents with    Fatigue     Patient recent admitted via PNA and discharged yesterday; increased work of breath but feels weak. Patient brought by [facility]       Review of Systems: See JASON note  Vital Signs: /67   Pulse 81   Temp 99.3 °F (37.4 °C) (Axillary)   Resp 23   Ht 1.778 m (5' 10\")   Wt 63.5 kg (140 lb)   SpO2 100%   BMI 20.09 kg/m²     Alert 89 y.o. male who appears ill, weak  HENT: Atraumatic, oral mucosa moist  Neck: Grossly normal ROM  Chest/Lungs: respiratory effort normal   Musculoskeletal: Grossly normal ROM  Skin: No palor or diaphoresis  Neuro: Alert and oriented x 3 but generally weak and fatigued does follow commands has a chronic tremor of his upper extremities sleeping but arousable    Medical Decision Making and Plan:  Pertinent Labs & Imaging studies reviewed. (See JASON chart for details)  I agree with JASON assessment and plan.  89-year-old male presents for generalized weakness.  Patient was recently seen and discharged for pneumonia.  Patient is positive for flu.  Chronic indwelling suprapubic catheter was changed here.  Urine does not appear infected.  But positive for flu.  Febrile.  Does have  a leukocytosis.  Mild hyponatremia.  Hypochloremia.  Lactate is normal.  Lithium level is normal.  CT head is negative.  Given Tylenol and IV fluids.  Patient be admitted for cephalopathy influenza ESTEBAN and metabolic abnormalities.  See JASON note for further details    I personally saw the patient and independently provided 0 minutes of nonconcurrent critical care out of the total shared critical care time provided    This includes multiple reevaluations, vital sign monitoring, pulse oximetry monitoring, telemetry monitoring, clinical response to the IV medications, reviewing the nursing notes, consultation time, dictation/documentation time, and interpretation of the labwork. (This time excludes time spent performing procedures).      EKG: All EKG's are interpreted by the Emergency Department Physician who either signs or Co-signs this chart in the absence of a cardiologist.    EKG Interpretation    Interpreted by emergency department physician    Rhythm: normal sinus   Rate: normal  Axis: Left  Ectopy: none  Conduction: normal  ST Segments: Nonspecific changes  T Waves: Nonspecific changes  Q Waves: none    Clinical Impression: Normal sinus rhythm, no significant T wave or ST changes.  Normal MO interval, normal QRS duration, normal QT QTC.  Left axis deviation.  Nonspecific ST and T wave changes interpreted by myself.            Laureano Cleary MD  02/26/25 0042

## 2025-02-26 NOTE — PROGRESS NOTES
Advance Directive  Patient has completed an advance directive  and Discussed the importance of establishing and updating an advance directive. Patient has questions at this time  Discussed with: Family member    Pt's wife believes pt has a HCPOA & LW and plans to bring docs from home tomorrow.  They will contact [facility] if they need assistance or have questions.     Jeniffer De La Cruz

## 2025-02-26 NOTE — PROGRESS NOTES
Dr. Segundo notified that patient had a wet brief even though he has a suprapubic catheter.  Small amount of blood coming out of penis also noted.

## 2025-02-26 NOTE — PROGRESS NOTES
4 Eyes Skin Assessment     NAME:  Sergio Newell  YOB: 1935  MEDICAL RECORD NUMBER:  [id number]    The patient is being assessed for  Admission    I agree that at least one RN has performed a thorough Head to Toe Skin Assessment on the patient. ALL assessment sites listed below have been assessed.      Areas assessed by both nurses:    Head, Face, Ears, Shoulders, Back, Chest, Arms, Elbows, Hands, Sacrum. Buttock, Coccyx, Ischium, Legs. Feet and Heels, and Under Medical Devices         Does the Patient have a Wound? Yes wound(s) were present on assessment. LDA wound assessment was Initiated and completed by RN     Redness on coccyx, no broken skin  Edenilson Prevention initiated by RN: Yes  Wound Care Orders initiated by RN: No    Pressure Injury (Stage 3,4, Unstageable, DTI, NWPT, and Complex wounds) if present, place Wound referral order by RN under : No    New Ostomies, if present place, Ostomy referral order under : No     Nurse 1 eSignature: Electronically signed by Tania Dhaliwal RN on 2/26/25 at 5:25 PM EST    **SHARE this note so that the co-signing nurse can place an eSignature**    Nurse 2 eSignature: Electronically signed by Parag Moore RN on 2/26/25 at 5:32 PM EST

## 2025-02-26 NOTE — PROGRESS NOTES
Patient aspirated on water for the second time today, patient placed on NPO diet until speech can see him in the morning.

## 2025-02-26 NOTE — CONSULTS
PALLIATIVE MEDICINE CONSULTATION     Patient name:Sergio Newell   MRN:[id number]    :1935  Room/Bed:5TN-5565/5565-01   LOS: 0 days         Date of consult:2025    Inpatient consult to Palliative Care  Consult performed by: Arabella Velazquez APRN - CNP  Consult ordered by: Julio Cesar Segundo MD  Reason for consult: GOC and code status              ASSESSMENT/RECOMMENDATIONS     89 y.o. male with AMS and weakness       Symptom Management:  AMS- pt is drowsy with garbled speech his wife is able to arouse him and he was able to answer orientation questions.   Weakness- per spouse pt is sleeping 20 out of 24 hours the past 2 days and has been too weak to get up has had minimal intake of food or water.  Pt had recent admission for PNA concern for aspiration. Eval for SLP ordered.   Goals of Care- Talked to spouse Kala at bedside about GOC.  She is adamantly against any discharge plan other than home.  She is a retired rehab RN, reports that patient went to [facility] rehab in the past and it was a \"horrible experience\".  Through the [employer] due to patient's exposure to nuclear waste as a  patient gets 40 hours of home care a week.  Wife is going to reach out to his  and see about getting additional equipment to the home like a hospital bed and a wheelchair. I explained the typical trajectory that occurs when age and frailty meet chronic medical illness, which involves increasingly frequent hospitalizations (along with the nosocomial complications they carry) and a progressive decline in performance status (as well as potential for rehabilitation) until an unsurvivable event occurs. Emphasis was placed on the subjective nature of quality of life, and what a person is willing to endure to stay alive, even if his/her medical status continues to decline. This led to a review of Sergio’s wishes as understood by family, the wife states that the patient has a living will and  she thinks he has a signed DNR at home she states that she will bring it in tomorrow.  We did talk briefly about hospice support if patient were not to improve in his mentation, eating and drinking.    Patient/Family Goals of Care :    wife states that the patient has a living will and she thinks he has a signed DNR at home she states that she will bring it in tomorrow.  We did talk briefly about hospice support if patient were not to improve in his mentation, eating and drinking.Will follow up with family meeting in the am     Disposition/Discharge Plan:   Pending     Advance Directives:    The patient has appointed the following active healthcare agents:    Primary Decision Maker: Kala Newell - Spouse - [phone]    The Patient has the following current code status:    Code Status: Limited      Interactive exchange regarding medications,tests and procedures with: patient, floor RN, Dr Segundo  Thank you for allowing us to participate in the care of this patient.      HISTORY     CC: AMS  HPI: The patient is a 89 y.o. male with  pmh of hyperlipidemia, hypertension, CAD, bipolar, dementia, history of syncope and CKD.  He discharged from [facility] on 2/2 for following acute hospitalization for pneumonia.  He was placed on azithromycin and Rocephin and transition to oralcefpodoxine at discharge.  He presents today for evaluation of worsening generalized weakness, fatigue, drowsiness and inability to walk since his discharge home.  He is found to have influenza A, ESTEBAN and possible dehydration.     Palliative Medicine SymptomScreening/ROS:    Review of Systems   Unable to perform ROS: Mental status change   Respiratory:  Positive for cough.              Palliative Performance Scale:     [] 60%  Amb reduced; Sig dz. Can't do hobbies/housework; Intake normal or reduced, Occasional assist; LOC full/confusion   [] 50%  Mainly sit/lie; Extensive disease. Mainly assist, Intake normal or reduced; Occasional assist;

## 2025-02-27 ENCOUNTER — APPOINTMENT (OUTPATIENT)
Dept: ULTRASOUND IMAGING | Age: 89
DRG: 683 | End: 2025-02-27
Payer: MEDICARE

## 2025-02-27 LAB
ALBUMIN SERPL-MCNC: 2.8 G/DL (ref 3.4–5)
ANION GAP SERPL CALCULATED.3IONS-SCNC: 12 MMOL/L (ref 3–16)
ANION GAP SERPL CALCULATED.3IONS-SCNC: 13 MMOL/L (ref 3–16)
BASOPHILS # BLD: 0 K/UL (ref 0–0.2)
BASOPHILS NFR BLD: 0 %
BUN SERPL-MCNC: 36 MG/DL (ref 7–20)
BUN SERPL-MCNC: 36 MG/DL (ref 7–20)
CALCIUM SERPL-MCNC: 8.8 MG/DL (ref 8.3–10.6)
CALCIUM SERPL-MCNC: 9.3 MG/DL (ref 8.3–10.6)
CHLORIDE SERPL-SCNC: 115 MMOL/L (ref 99–110)
CHLORIDE SERPL-SCNC: 119 MMOL/L (ref 99–110)
CO2 SERPL-SCNC: 16 MMOL/L (ref 21–32)
CO2 SERPL-SCNC: 19 MMOL/L (ref 21–32)
CREAT SERPL-MCNC: 2.7 MG/DL (ref 0.8–1.3)
CREAT SERPL-MCNC: 2.7 MG/DL (ref 0.8–1.3)
DEPRECATED RDW RBC AUTO: 15.1 % (ref 12.4–15.4)
EOSINOPHIL # BLD: 0 K/UL (ref 0–0.6)
EOSINOPHIL NFR BLD: 0 %
GFR SERPLBLD CREATININE-BSD FMLA CKD-EPI: 22 ML/MIN/{1.73_M2}
GFR SERPLBLD CREATININE-BSD FMLA CKD-EPI: 22 ML/MIN/{1.73_M2}
GLUCOSE SERPL-MCNC: 121 MG/DL (ref 70–99)
GLUCOSE SERPL-MCNC: 124 MG/DL (ref 70–99)
HCT VFR BLD AUTO: 32.4 % (ref 40.5–52.5)
HGB BLD-MCNC: 10.4 G/DL (ref 13.5–17.5)
LYMPHOCYTES # BLD: 1.3 K/UL (ref 1–5.1)
LYMPHOCYTES NFR BLD: 9 %
MCH RBC QN AUTO: 30.7 PG (ref 26–34)
MCHC RBC AUTO-ENTMCNC: 32.2 G/DL (ref 31–36)
MCV RBC AUTO: 95.3 FL (ref 80–100)
METAMYELOCYTES NFR BLD MANUAL: 3 %
MONOCYTES # BLD: 0.7 K/UL (ref 0–1.3)
MONOCYTES NFR BLD: 5 %
NEUTROPHILS # BLD: 8.6 K/UL (ref 1.7–7.7)
NEUTROPHILS NFR BLD: 78 %
OSMOLALITY SERPL: 333 MOSM/KG (ref 280–301)
OSMOLALITY UR: 192 MOSM/KG (ref 390–1070)
PHOSPHATE SERPL-MCNC: 4 MG/DL (ref 2.5–4.9)
PLATELET # BLD AUTO: 283 K/UL (ref 135–450)
PLATELET BLD QL SMEAR: ADEQUATE
PMV BLD AUTO: 7.8 FL (ref 5–10.5)
POTASSIUM SERPL-SCNC: 4.4 MMOL/L (ref 3.5–5.1)
POTASSIUM SERPL-SCNC: 4.5 MMOL/L (ref 3.5–5.1)
PROMONOCYTES: 2 %
RBC # BLD AUTO: 3.4 M/UL (ref 4.2–5.9)
SCHISTOCYTES BLD QL SMEAR: ABNORMAL
SLIDE REVIEW: ABNORMAL
SODIUM SERPL-SCNC: 144 MMOL/L (ref 136–145)
SODIUM SERPL-SCNC: 150 MMOL/L (ref 136–145)
SODIUM UR-SCNC: 48 MMOL/L
VARIANT LYMPHS NFR BLD MANUAL: 3 % (ref 0–6)
WBC # BLD AUTO: 10.6 K/UL (ref 4–11)

## 2025-02-27 PROCEDURE — 6370000000 HC RX 637 (ALT 250 FOR IP): Performed by: STUDENT IN AN ORGANIZED HEALTH CARE EDUCATION/TRAINING PROGRAM

## 2025-02-27 PROCEDURE — 92610 EVALUATE SWALLOWING FUNCTION: CPT

## 2025-02-27 PROCEDURE — 97530 THERAPEUTIC ACTIVITIES: CPT

## 2025-02-27 PROCEDURE — 83930 ASSAY OF BLOOD OSMOLALITY: CPT

## 2025-02-27 PROCEDURE — 85025 COMPLETE CBC W/AUTO DIFF WBC: CPT

## 2025-02-27 PROCEDURE — 1200000000 HC SEMI PRIVATE

## 2025-02-27 PROCEDURE — 6370000000 HC RX 637 (ALT 250 FOR IP): Performed by: NURSE PRACTITIONER

## 2025-02-27 PROCEDURE — 84300 ASSAY OF URINE SODIUM: CPT

## 2025-02-27 PROCEDURE — 6360000002 HC RX W HCPCS: Performed by: NURSE PRACTITIONER

## 2025-02-27 PROCEDURE — 80069 RENAL FUNCTION PANEL: CPT

## 2025-02-27 PROCEDURE — 76770 US EXAM ABDO BACK WALL COMP: CPT

## 2025-02-27 PROCEDURE — 99223 1ST HOSP IP/OBS HIGH 75: CPT | Performed by: HOSPITALIST

## 2025-02-27 PROCEDURE — 2500000003 HC RX 250 WO HCPCS: Performed by: NURSE PRACTITIONER

## 2025-02-27 PROCEDURE — 97166 OT EVAL MOD COMPLEX 45 MIN: CPT

## 2025-02-27 PROCEDURE — 97162 PT EVAL MOD COMPLEX 30 MIN: CPT

## 2025-02-27 PROCEDURE — 2580000003 HC RX 258: Performed by: HOSPITALIST

## 2025-02-27 PROCEDURE — 2580000003 HC RX 258: Performed by: STUDENT IN AN ORGANIZED HEALTH CARE EDUCATION/TRAINING PROGRAM

## 2025-02-27 PROCEDURE — 92526 ORAL FUNCTION THERAPY: CPT

## 2025-02-27 PROCEDURE — 36415 COLL VENOUS BLD VENIPUNCTURE: CPT

## 2025-02-27 PROCEDURE — 83935 ASSAY OF URINE OSMOLALITY: CPT

## 2025-02-27 RX ORDER — DEXTROSE MONOHYDRATE 50 MG/ML
INJECTION, SOLUTION INTRAVENOUS CONTINUOUS
Status: DISCONTINUED | OUTPATIENT
Start: 2025-02-27 | End: 2025-03-02

## 2025-02-27 RX ORDER — OXYBUTYNIN CHLORIDE 5 MG/1
5 TABLET ORAL 3 TIMES DAILY
Status: DISCONTINUED | OUTPATIENT
Start: 2025-02-27 | End: 2025-03-02 | Stop reason: HOSPADM

## 2025-02-27 RX ADMIN — ASPIRIN 81 MG: 81 TABLET, COATED ORAL at 10:50

## 2025-02-27 RX ADMIN — LAMOTRIGINE 200 MG: 100 TABLET ORAL at 10:50

## 2025-02-27 RX ADMIN — HEPARIN SODIUM 5000 UNITS: 5000 INJECTION INTRAVENOUS; SUBCUTANEOUS at 16:07

## 2025-02-27 RX ADMIN — DEXTROSE MONOHYDRATE: 50 INJECTION, SOLUTION INTRAVENOUS at 18:29

## 2025-02-27 RX ADMIN — DONEPEZIL HYDROCHLORIDE 10 MG: 5 TABLET ORAL at 20:52

## 2025-02-27 RX ADMIN — LEVOTHYROXINE SODIUM 25 MCG: 0.03 TABLET ORAL at 10:51

## 2025-02-27 RX ADMIN — LITHIUM CARBONATE 300 MG: 300 CAPSULE ORAL at 10:50

## 2025-02-27 RX ADMIN — HEPARIN SODIUM 5000 UNITS: 5000 INJECTION INTRAVENOUS; SUBCUTANEOUS at 20:52

## 2025-02-27 RX ADMIN — DEXTROSE AND SODIUM CHLORIDE: 5; 200 INJECTION, SOLUTION INTRAVENOUS at 07:11

## 2025-02-27 RX ADMIN — PRAVASTATIN SODIUM 20 MG: 20 TABLET ORAL at 18:19

## 2025-02-27 RX ADMIN — HEPARIN SODIUM 5000 UNITS: 5000 INJECTION INTRAVENOUS; SUBCUTANEOUS at 05:13

## 2025-02-27 RX ADMIN — OXYBUTYNIN CHLORIDE 5 MG: 5 TABLET ORAL at 20:52

## 2025-02-27 RX ADMIN — Medication 1000 MCG: at 18:19

## 2025-02-27 RX ADMIN — DEXTROSE MONOHYDRATE: 50 INJECTION, SOLUTION INTRAVENOUS at 09:27

## 2025-02-27 RX ADMIN — OXYBUTYNIN CHLORIDE 5 MG: 5 TABLET ORAL at 16:11

## 2025-02-27 RX ADMIN — ALLOPURINOL 50 MG: 100 TABLET ORAL at 10:50

## 2025-02-27 RX ADMIN — OSELTAMIVIR 30 MG: 30 CAPSULE ORAL at 10:50

## 2025-02-27 RX ADMIN — CEFDINIR 300 MG: 300 CAPSULE ORAL at 10:50

## 2025-02-27 RX ADMIN — Medication 10 ML: at 11:11

## 2025-02-27 NOTE — CONSULTS
Nephrology Consult Note                                                                                                                                                                                                                                                                                                                                                               Office : [phone]     Fax :[phone]    Patient's Name: Sergio Newell  3:52 PM  2/27/2025    Reason for Consult:  ESTEBAN on CKD 3/4  Requesting Physician:  Anabell Bronson MD  Chief Complaint:    Chief Complaint   Patient presents with    Fatigue     Patient recent admitted via PNA and discharged yesterday; increased work of breath but feels weak. 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 Creatinine elevated to 2.7--> .   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 male with a pmh of hyperlipidemia, hypertension, CAD, bipolar, dementia, history of syncope, hx of parathyroid adenoma, DI, and CKD.  He discharged from [facility] on 2/2 for following acute hospitalization for pneumonia.  He was placed on azithromycin and Rocephin and transition to oralcefpodoxine at discharge.  He presents today for evaluation of worsening generalized weakness, fatigue, drowsiness and inability to walk since his discharge home.  He is found to have influenza A, ESTEBAN and possible dehydration. He is being admitted for further workup. We are consulted for ESTEBAN on CKD 3 and hypernatremia.       Interval hx     Sitting  30 mg, Daily        Review of Systems:   14 point ROS obtained but were negative except mentioned in HPI      Physical exam:     Vitals:  /71   Pulse 79   Temp 98 °F (36.7 °C) (Oral)   Resp 18   Ht 1.778 m (5' 10\")   Wt 63.5 kg (140 lb)   SpO2 96%   BMI 20.09 kg/m²   Constitutional:  OAA X3 NAD Yes  Skin: no rash, turgor wnl  Heent:  eomi, mmm  Neck: no bruits or jvd noted  Cardiovascular:  S1, S2 without m/r/g  Respiratory: CTA B without w/r/r on room air  Abdomen:  , soft, nt, nd  Ext:  lower extremity edema No  Psychiatric: mood and affect flat  Musculoskeletal:  Rom, muscular strength intact    Data:   Labs:  CBC:   Recent Labs     02/25/25 1911 02/26/25  1112 02/27/25  0551   WBC 14.9* 14.8* 10.6   HGB 11.4* 10.6* 10.4*    253 283     BMP:    Recent Labs     02/26/25 1036 02/26/25 1811 02/27/25  0551   * 150* 150*   K 4.5 4.9 4.5   * 119* 119*   CO2 20* 20* 19*   BUN 30* 33* 36*   CREATININE 2.3* 2.4* 2.7*   GLUCOSE 106* 118* 124*     Ca/Mg/Phos:   Recent Labs     02/25/25 1911 02/26/25  1036 02/26/25 1811 02/27/25  0551   CALCIUM 9.8 9.4 9.2 9.3   MG 2.77*  --   --   --      Hepatic:   Recent Labs     02/25/25 1911   AST 23   ALT 19   BILITOT <0.2   ALKPHOS 99     Troponin: No results for input(s): \"TROPONINI\" in the last 72 hours.  BNP: No results for input(s): \"BNP\" in the last 72 hours.  Lipids: No results for input(s): \"CHOL\", \"TRIG\", \"HDL\" in the last 72 hours.    Invalid input(s): \"LDLCALC\", \"LABVLDL\"  ABGs: No results for input(s): \"PHART\", \"PO2ART\", \"FMH5NMP\" in the last 72 hours.  INR: No results for input(s): \"INR\" in the last 72 hours.  UA:  Recent Labs     02/25/25 2137   COLORU Yellow   CLARITYU Clear   GLUCOSEU Negative   BILIRUBINUR Negative   KETUA Negative   BLOODU LARGE*   PHUR 6.5   PROTEINU 100*   UROBILINOGEN 0.2   NITRU Negative   LEUKOCYTESUR TRACE*   URINETYPE Voided      Urine Microscopic:   Recent Labs     02/25/25 2137   BACTERIA None Seen

## 2025-02-27 NOTE — PROGRESS NOTES
Patient's suprapubic catheter was found leaking around insertion site upon checking patient, unsure if this was due to a crimped tube that was noticed when moving patient from the chair back to bed.  Patient's brief was also wet.  Patient's brief, gown, sanjuana, and sheets changed.

## 2025-02-27 NOTE — PROGRESS NOTES
Pt had ring on both hands. Pts daughter Aniyah  was worried of his finger being swollen. With pts approval both rings removed and handed to the daughter.

## 2025-02-27 NOTE — PROGRESS NOTES
note    Consult received. Patient w hx of ngb s/p spt. Having leaking around tube and per urethra. Nurse reports has had good uop from catheter. Will place order to irrigate the catheter and start oxybutynin for spasms. ASHLEY for ESTEBAN and to make sure draining. If ASHLEY shows hydronephrosis contact the urology group and a CT may need to be ordered and the catheter may need changed sooner.     Henrique Hughes, JONATHAN  02/27/2025

## 2025-02-27 NOTE — PLAN OF CARE
Problem: Skin/Tissue Integrity  Goal: Skin integrity remains intact  Description: 1.  Monitor for areas of redness and/or skin breakdown  2.  Assess vascular access sites hourly  3.  Every 4-6 hours minimum:  Change oxygen saturation probe site  4.  Every 4-6 hours:  If on nasal continuous positive airway pressure, respiratory therapy assess nares and determine need for appliance change or resting period  2/27/2025 0103 by Nick Monterroso RN  Outcome: Progressing  2/26/2025 1805 by Tania Dhaliwal RN  Outcome: Progressing     Problem: Safety - Adult  Goal: Free from fall injury  2/27/2025 0103 by Nick Monterroso RN  Outcome: Progressing  2/26/2025 1805 by Tania Dhaliwal RN  Outcome: Progressing     Problem: ABCDS Injury Assessment  Goal: Absence of physical injury  2/27/2025 0103 by Nick Monterroso RN  Outcome: Progressing  2/26/2025 1805 by Tania Dhaliwal RN  Outcome: Progressing     Problem: Neurosensory - Adult  Goal: Achieves stable or improved neurological status  2/27/2025 0103 by Nick Monterroso RN  Outcome: Progressing  2/26/2025 1805 by Tania Dhaliwal RN  Outcome: Progressing  Goal: Achieves maximal functionality and self care  2/27/2025 0103 by Nick Monterroso RN  Outcome: Progressing  2/26/2025 1805 by Tania Dhaliwal RN  Outcome: Progressing     Problem: Respiratory - Adult  Goal: Achieves optimal ventilation and oxygenation  2/27/2025 0103 by Nick Monterroso RN  Outcome: Progressing  2/26/2025 1805 by Tania Dhaliwal RN  Outcome: Progressing     Problem: Genitourinary - Adult  Goal: Absence of urinary retention  2/27/2025 0103 by Nick Monterroso RN  Outcome: Progressing  2/26/2025 1805 by Tania Dhaliwal RN  Outcome: Progressing  Goal: Urinary catheter remains patent  2/27/2025 0103 by Nick Monterroso RN  Outcome: Progressing  2/26/2025 1805 by Tania Dhaliwal RN  Outcome: Progressing     Problem: Infection - Adult  Goal: Absence of infection at discharge  2/27/2025 0103 by Nick Monterroso

## 2025-02-27 NOTE — PROGRESS NOTES
Update received from speech, diet changed to minced and moist, thin liquids, no straws.  Patient took pills well crushed in applesauce, needs assistant taking d/t tremors.  Plan is for him to have a barium swallow study tomorrow morning.  Blanca set up at bedside with extension so it can reach chair.  Wife is at bedside.

## 2025-02-27 NOTE — PROGRESS NOTES
[facility] - Inpatient Rehabilitation Department   Phone: [phone]    Physical Therapy    [x] Initial Evaluation            [] Daily Treatment Note         [] Discharge Summary      Patient: Sergio Newell   : 1935   MRN: [id number]   Date of Service:  2025  Admitting Diagnosis: Influenza A  Current Admission Summary: 89 y.o. male with a pmh of Diabetes Insupidus  hypertension, CAD, bipolar, dementia, history of syncope and CKD admitted for worsening weakness, dysphagia,hypernatremia  He discharged from [facility] on  for following acute hospitalization for pneumonia.  Poor intake and appear is declining health, discussion of GOC done , wife wants full rx but is DNR GI will see for peg tube   Past Medical History:  has a past medical history of CAD (coronary artery disease), Chronic kidney disease, Diabetes insipidus, Hypercholesteremia, and Hypertension.  Past Surgical History:  has a past surgical history that includes Coronary artery bypass graft; Retinal detachment surgery; hernia repair; knee surgery; Appendectomy (10-); and Thyroid surgery.    Discharge Recommendations: Sergio Newell scored a  on the AM-PAC short mobility form. Current research shows that an AM-PAC score of 17 or less is typically not associated with a discharge to the patient's home setting. Based on the patient's AM-PAC score and their current functional mobility deficits, it is recommended that the patient have 5-7 sessions per week of Physical Therapy at d/c to increase the patient's independence.  At this time, this patient demonstrates complex nursing, medical, and rehabilitative needs, and would benefit from intensive rehabilitation services upon discharge from the Inpatient setting.  This patient demonstrates the ability to participate in and benefit from an intensive therapy program with a coordinated interdisciplinary team approach to foster frequent, structured, and documented  learner    Assessment  Activity Tolerance: Fair  Impairments Requiring Therapeutic Intervention: decreased functional mobility, decreased ADL status, decreased strength, decreased safety awareness, decreased cognition, decreased endurance, decreased balance, decreased posture  Prognosis: good and fair  Clinical Assessment:  Pt is limited by the above deficits and would benefit from skilled PT services to maximize pt functional mobility and safety prior to discharge.    Safety Interventions: patient left in chair, chair alarm in place, call light within reach, gait belt, patient at risk for falls, nurse notified, and family/caregiver present    Plan  Frequency: 3-5 x/per week  Current Treatment Recommendations: strengthening, ROM, balance training, functional mobility training, transfer training, gait training, endurance training, patient/caregiver education, safety education, and equipment evaluation/education    Goals  Patient Goals: not stated   Short Term Goals:  Time Frame: Discharge  Patient will complete bed mobility at minimal assistance   Patient will complete transfers at minimal assistance   Patient will ambulate 20 ft with use of rolling walker at contact guard assistance  Patient will ascend/descend 2 stairs with (B) handrail at contact guard assistance    Above goals reviewed on 2/27/2025.  All goals are ongoing at this time unless indicated above.      Therapy Session Time      Individual Group Co-treatment   Time In     0938   Time Out     1018   Minutes     40     Timed Code Treatment Minutes:  25 Minutes  Total Treatment Minutes:  40       Electronically Signed By: Brea Arriaza, PT  Brea Arriaza, [id number]

## 2025-02-27 NOTE — PROGRESS NOTES
Date of Admission: 2/25/2025. Hospital Day: 3       HOSPITAL COURSE  89 y.o. male with a pmh of Diabetes Insupidus  hypertension, CAD, bipolar, dementia, history of syncope and CKD admitted for worsening weakness, dysphagia,hypernatremia  He discharged from [facility] on 2/2 for following acute hospitalization for pneumonia.  Poor intake and appear to be in  declining health, extensive discussion of GOC done , he is DNR  but otherwise full treatment .GI will see for peg tube  Unlikely he can go home anytime soon, PT will see today but wife doesn't want him in any snf except aru    Interval  History:   Little more awake ,remains npo for speech       Physical Exam     /71   Pulse 79   Temp 98 °F (36.7 °C) (Oral)   Resp 18   Ht 1.778 m (5' 10\")   Wt 63.5 kg (140 lb)   SpO2 96%   BMI 20.09 kg/m²     General appearance: No apparent distress, appears stated age and cooperative. emaciated  Respiratory:  Normal respiratory effort. Clear to auscultation, bilaterally without Rales/Wheezes/Rhonchi.  Cardiovascular: Regular rate and rhythm with normal S1/S2 without murmurs, rubs or gallops.  Neuro: no focal deficit noted, confused          Assessment/Plan:    #Hypernatremia  #Diabetes insipidus  #ESTEBAN  Na remain high 150, cont ivf. Change to D5 .  Nephro consulted, d/w dr bergman      #Dysphagia  #Poor oral intake  Pt has dysphagia and poor intake and unable to have enough fluid intake to match his loss from DI causing ESTEBAN/ hypernatremia  Wife thinks he should have a peg tube although given his declining health , that may not add more to  quality of life  Speech following, consult GI    Pna  Influenza A infection. Recent bacterial pna .  No evidence of pna on cxr, start tamiflu    # Chronically elevated troponin.  Troponin around his baseline troponin in 50s.  No reported chest pain.  No acute ischemia on EKG.  Continue telemetry and trend troponin.     Dementia.  # Intermittent confusion-Per family  Problem: OCCUPATIONAL THERAPY ADULT  Goal: Performs self-care activities at highest level of function for planned discharge setting  See evaluation for individualized goals  Treatment Interventions: ADL retraining, Functional transfer training, Endurance training, Patient/family training, Equipment evaluation/education, Compensatory technique education, Energy conservation, Activityengagement          See flowsheet documentation for full assessment, interventions and recommendations  Limitation: Decreased ADL status, Decreased endurance, Decreased self-care trans, Decreased high-level ADLs  Prognosis: Good  Assessment: Patient is a 82 y o  male seen for OT evaluation s/p admit to [id number] [place] [address] on 1/2/2019 w/Dizziness  Pt presents with dizziness, c/o vertigo x2 days  Commorbidities affecting patient's functional performance at time of assessment include:BRBPR, chest tightness/pressure, cholecystitis, DM, lyme disease, SOB, vertigo, BPH, hypothyroidism  Orders placed for OT evaluation and treatment   Performed at least two patient identifiers during session including name and wristband  Prior to admission, Patient reporting indepependent with ADLs/ IADLs  Patient lives with a  friend (significant other)  in a one story house [address], Patient drives, ambulates with  [facility] [place] [place], manages his affairs independently  Personal factors affecting patient at time of initial evaluation include: decreased initiation and engagement, difficulty performing ADLs and difficulty performing IADLs   Upon evaluation, patient requires minimal  assist for UB ADLs, minimal  and moderate assist for LB ADLs, Occupational performance is affected by the following deficits: degenerative arthritic joint changes, dynamic sit/ stand balance deficit with poor standing tolerance time for self care and functional mobility, decreased activity tolerance, decreased safety awareness and postural control and postural alignment deficit, requiring external assistance to complete transitional movements  Therapist completed  extensive additional review of medical records and additional review of physical, cognitive or psychosocial history, clinical examination identifying 5 or more performance deficits, clinical decision making of a high complexity , consistent with a high complexity level evaluation  Patient to benefit from continued Occupational Therapy treatment while in the hospital to address deficits as defined above and maximize level of functional independence with ADLs and functional mobility  Occupational Performance areas to address include: grooming , bathing/ shower, dressing, toilet hygiene, transfer to all surfaces, functional mobility, emergency response, health maintenance, medication routine/ management, IADLS: Household maintenance, IADLs: safety procedures, IADLs: meal prep/ clean up, Leisure Participation and Social participation  From OT standpoint, recommendation at time of d/c would be Short Term Rehab         OT Discharge Recommendation: Short Term Rehab hours.  Recent Labs     02/25/25 1911 02/25/25  2018   TROPHS 52* 51*       Urinalysis:      Lab Results   Component Value Date/Time    NITRU Negative 02/25/2025 09:37 PM    WBCUA 5 02/25/2025 09:37 PM    BACTERIA None Seen 02/25/2025 09:37 PM    RBCUA 10 02/25/2025 09:37 PM    RBCUA NEGATIVE 11/15/2017 12:30 PM    BLOODU LARGE 02/25/2025 09:37 PM    GLUCOSEU Negative 02/25/2025 09:37 PM    GLUCOSEU NEGATIVE 05/10/2012 02:25 PM       Radiology:  CT HEAD WO CONTRAST   Final Result   No acute intracranial abnormality.         XR CHEST PORTABLE   Final Result   No acute cardiopulmonary disease.             IP CONSULT TO CASE MANAGEMENT  IP CONSULT TO PALLIATIVE CARE  IP CONSULT TO SPIRITUAL SERVICES  IP CONSULT TO MG Segundo MD

## 2025-02-27 NOTE — PLAN OF CARE
Problem: Metabolic/Fluid and Electrolytes - Adult  Goal: Electrolytes maintained within normal limits  2/27/2025 1336 by Tania Dhaliwal, RN  Outcome: Not Progressing- sodium level still elevated  2/27/2025 0103 by Nick Monterroso, RN  Outcome: Progressing

## 2025-02-27 NOTE — PROGRESS NOTES
[facility] - Inpatient Rehabilitation Department   Phone: [phone]    Occupational Therapy    [x] Initial Evaluation            [] Daily Treatment Note         [] Discharge Summary      Patient: Sergio Newell   : 1935   MRN: [id number]   Date of Service:  2025    Admitting Diagnosis:  Influenza A  Current Admission Summary: 89 y.o. male with a pmh of hyperlipidemia, hypertension, CAD, bipolar, dementia, history of syncope and CKD.  He discharged from [facility] on  for following acute hospitalization for pneumonia.  He was placed on azithromycin and Rocephin and transition to oralcefpodoxine at discharge.  He presents today for evaluation of worsening generalized weakness, fatigue, drowsiness and inability to walk since his discharge home.  He is found to have influenza A, ESTEBAN and possible dehydration.   Past Medical History:  has a past medical history of CAD (coronary artery disease), Chronic kidney disease, Diabetes insipidus, Hypercholesteremia, and Hypertension.  Past Surgical History:  has a past surgical history that includes Coronary artery bypass graft; Retinal detachment surgery; hernia repair; knee surgery; Appendectomy (10-); and Thyroid surgery.    Discharge Recommendations: Sergio Newell scored a 13/24 on the AM-PAC ADL Inpatient form. Current research shows that an AM-PAC score of 17 or less is typically not associated with a discharge to the patient's home setting. Based on the patient's AM-PAC score and their current ADL deficits, it is recommended that the patient have 5-7 sessions per week of Occupational Therapy at d/c to increase the patient's independence.  At this time, this patient demonstrates complex nursing, medical, and rehabilitative needs, and would benefit from intensive rehabilitation services upon discharge from the Inpatient setting.  This patient demonstrates the ability to participate in and benefit from an intensive therapy

## 2025-02-27 NOTE — PROGRESS NOTES
VSS, patient alert and oriented x4, can be forgetful/confused at times.  Patient is much more alert than when he came in yesterday.  Waiting for speech to see the patient before giving him his morning medications.

## 2025-02-27 NOTE — PROGRESS NOTES
Speech Language Pathology  [facility]  [phone]  SLP Clinical Swallow Evaluation       Patient: Sergio Newell   : 1935   MRN: [id number]      Evaluation Date: 2025      Admitting Dx: Hyperchloremia [E87.8]  Hypermagnesemia [E83.41]  Hypernatremia [E87.0]  Influenza A [J10.1]  Elevated troponin [R79.89]  Elevated brain natriuretic peptide (BNP) level [R79.89]  Acute encephalopathy [G93.40]  Acute kidney injury superimposed on CKD [N17.9, N18.9]  Treatment Diagnosis: Cognitive-Linguistic Deficits , Oropharyngeal Dysphagia   Pain: Denies                                  Recommendations      Recommended Diet and Intervention 2025:  Diet Solids Recommendation:  Dysphagia II Minced and Moist  Liquid Consistency Recommendation:  Thin liquids, No straws  Recommended form of Meds: Meds in puree  or Meds crushed as able in puree    Recommend completion of Modified Barium Swallow study to further assess pharyngeal phase of swallow   **If respiratory status declines or if symptoms of aspiration are noted, HOLD po diet pending MBS results     Compensatory strategies: Upright as possible with all PO intake , No straws , Assist Feed , Small bites/sips , Swallow 2 times per bite , Eat/feed slowly, Remain upright 30-45 min     Discharge Recommendations:  Discharge recommendations to be determined pending ongoing follow-up during acute care stay    History/Course of Treatment     H&P: Sergio Newell is a 89 y.o. male with a pmh of hyperlipidemia, hypertension, CAD, bipolar, dementia, history of syncope and CKD.  He discharged from [facility] on  for following acute hospitalization for pneumonia.  He was placed on azithromycin and Rocephin and transition to oralcefpodoxine at discharge.  He presents today for evaluation of worsening generalized weakness, fatigue, drowsiness and inability to walk since his discharge home.  He is found to have influenza A,  recommendations discussed with pt including rationale for diet modification recommendations.  - Skilled instruction re: aspiration risk, evidence-based methods of decreasing adverse outcomes associated with aspiration, concern for aspiration in relation to current respiratory status, breathe-swallow coordination   - Demonstration and training for use of safe swallowing strategies       Goals     Goals:   Dysphagia Goals: Pt will functionally tolerate recommended diet with no overt clinical s/s of aspiration   Pt will demonstrate understanding of aspiration risk and precautions via education/demonstration with occasional prompting  Pt will advance to least restrictive diet as indicated   Pt will participate in Modified Barium Swallow Study (MBS)  to further assess pharyngeal phase of swallow, assist with diet recommendations and to further direct plan of care       Above goals reviewed on 2/27/2025.  All goals are ongoing at this time unless indicated above.       POC/Education     Dysphagia Therapeutic Intervention:  Diet Tolerance Monitoring , Patient/Family Education , Instrumental assessment of swallow function (Modified Barium Swallow Study)     Plan of care: 3-5 times per week during acute care stay.      Education:  Provided education regarding role of SLP, results of assessment, recommendations and general speech pathology plan of care:  Pt requires ongoing learning     If patient discharges prior to next visit, this note will serve as discharge.     Treatment time:  Timed Code Treatment Minutes: 0 min  Total Treatment Time Minutes:25 min    Bella LAW-SLP#[id number]

## 2025-02-28 ENCOUNTER — APPOINTMENT (OUTPATIENT)
Dept: GENERAL RADIOLOGY | Age: 89
DRG: 683 | End: 2025-02-28
Payer: MEDICARE

## 2025-02-28 LAB
ALBUMIN SERPL-MCNC: 2.6 G/DL (ref 3.4–5)
ALBUMIN SERPL-MCNC: 2.6 G/DL (ref 3.4–5)
ALBUMIN SERPL-MCNC: 2.8 G/DL (ref 3.4–5)
ANION GAP SERPL CALCULATED.3IONS-SCNC: 11 MMOL/L (ref 3–16)
ANION GAP SERPL CALCULATED.3IONS-SCNC: 14 MMOL/L (ref 3–16)
ANION GAP SERPL CALCULATED.3IONS-SCNC: 9 MMOL/L (ref 3–16)
BUN SERPL-MCNC: 36 MG/DL (ref 7–20)
CALCIUM SERPL-MCNC: 8.4 MG/DL (ref 8.3–10.6)
CHLORIDE SERPL-SCNC: 111 MMOL/L (ref 99–110)
CHLORIDE SERPL-SCNC: 113 MMOL/L (ref 99–110)
CHLORIDE SERPL-SCNC: 113 MMOL/L (ref 99–110)
CO2 SERPL-SCNC: 15 MMOL/L (ref 21–32)
CO2 SERPL-SCNC: 19 MMOL/L (ref 21–32)
CO2 SERPL-SCNC: 19 MMOL/L (ref 21–32)
CREAT SERPL-MCNC: 2.5 MG/DL (ref 0.8–1.3)
CREAT SERPL-MCNC: 2.5 MG/DL (ref 0.8–1.3)
CREAT SERPL-MCNC: 2.6 MG/DL (ref 0.8–1.3)
GFR SERPLBLD CREATININE-BSD FMLA CKD-EPI: 23 ML/MIN/{1.73_M2}
GFR SERPLBLD CREATININE-BSD FMLA CKD-EPI: 24 ML/MIN/{1.73_M2}
GFR SERPLBLD CREATININE-BSD FMLA CKD-EPI: 24 ML/MIN/{1.73_M2}
GLUCOSE SERPL-MCNC: 101 MG/DL (ref 70–99)
GLUCOSE SERPL-MCNC: 112 MG/DL (ref 70–99)
GLUCOSE SERPL-MCNC: 125 MG/DL (ref 70–99)
LAMOTRIGINE SERPL-MCNC: 8.3 UG/ML (ref 3–15)
PHOSPHATE SERPL-MCNC: 3.6 MG/DL (ref 2.5–4.9)
PHOSPHATE SERPL-MCNC: 4 MG/DL (ref 2.5–4.9)
PHOSPHATE SERPL-MCNC: 4.1 MG/DL (ref 2.5–4.9)
POTASSIUM SERPL-SCNC: 4 MMOL/L (ref 3.5–5.1)
POTASSIUM SERPL-SCNC: 4.1 MMOL/L (ref 3.5–5.1)
POTASSIUM SERPL-SCNC: 4.4 MMOL/L (ref 3.5–5.1)
SODIUM SERPL-SCNC: 140 MMOL/L (ref 136–145)
SODIUM SERPL-SCNC: 141 MMOL/L (ref 136–145)
SODIUM SERPL-SCNC: 143 MMOL/L (ref 136–145)

## 2025-02-28 PROCEDURE — 6370000000 HC RX 637 (ALT 250 FOR IP): Performed by: NURSE PRACTITIONER

## 2025-02-28 PROCEDURE — 36415 COLL VENOUS BLD VENIPUNCTURE: CPT

## 2025-02-28 PROCEDURE — 2580000003 HC RX 258: Performed by: HOSPITALIST

## 2025-02-28 PROCEDURE — 1200000000 HC SEMI PRIVATE

## 2025-02-28 PROCEDURE — 74230 X-RAY XM SWLNG FUNCJ C+: CPT

## 2025-02-28 PROCEDURE — 6370000000 HC RX 637 (ALT 250 FOR IP): Performed by: STUDENT IN AN ORGANIZED HEALTH CARE EDUCATION/TRAINING PROGRAM

## 2025-02-28 PROCEDURE — 99233 SBSQ HOSP IP/OBS HIGH 50: CPT | Performed by: HOSPITALIST

## 2025-02-28 PROCEDURE — 6360000002 HC RX W HCPCS: Performed by: NURSE PRACTITIONER

## 2025-02-28 PROCEDURE — 92611 MOTION FLUOROSCOPY/SWALLOW: CPT

## 2025-02-28 PROCEDURE — 92526 ORAL FUNCTION THERAPY: CPT

## 2025-02-28 PROCEDURE — 80069 RENAL FUNCTION PANEL: CPT

## 2025-02-28 PROCEDURE — 2580000003 HC RX 258: Performed by: INTERNAL MEDICINE

## 2025-02-28 PROCEDURE — 2500000003 HC RX 250 WO HCPCS: Performed by: NURSE PRACTITIONER

## 2025-02-28 RX ADMIN — OSELTAMIVIR 30 MG: 30 CAPSULE ORAL at 09:34

## 2025-02-28 RX ADMIN — OXYBUTYNIN CHLORIDE 5 MG: 5 TABLET ORAL at 09:33

## 2025-02-28 RX ADMIN — Medication 10 ML: at 09:45

## 2025-02-28 RX ADMIN — ALLOPURINOL 50 MG: 100 TABLET ORAL at 09:33

## 2025-02-28 RX ADMIN — CEFDINIR 300 MG: 300 CAPSULE ORAL at 09:33

## 2025-02-28 RX ADMIN — OXYBUTYNIN CHLORIDE 5 MG: 5 TABLET ORAL at 14:55

## 2025-02-28 RX ADMIN — Medication 1000 MCG: at 18:05

## 2025-02-28 RX ADMIN — HEPARIN SODIUM 5000 UNITS: 5000 INJECTION INTRAVENOUS; SUBCUTANEOUS at 20:48

## 2025-02-28 RX ADMIN — ASPIRIN 81 MG: 81 TABLET, COATED ORAL at 09:34

## 2025-02-28 RX ADMIN — DEXTROSE MONOHYDRATE: 50 INJECTION, SOLUTION INTRAVENOUS at 11:42

## 2025-02-28 RX ADMIN — LEVOTHYROXINE SODIUM 25 MCG: 0.03 TABLET ORAL at 05:43

## 2025-02-28 RX ADMIN — LITHIUM CARBONATE 300 MG: 300 CAPSULE ORAL at 09:33

## 2025-02-28 RX ADMIN — OXYBUTYNIN CHLORIDE 5 MG: 5 TABLET ORAL at 20:48

## 2025-02-28 RX ADMIN — HEPARIN SODIUM 5000 UNITS: 5000 INJECTION INTRAVENOUS; SUBCUTANEOUS at 14:55

## 2025-02-28 RX ADMIN — DEXTROSE MONOHYDRATE: 50 INJECTION, SOLUTION INTRAVENOUS at 02:54

## 2025-02-28 RX ADMIN — Medication 10 ML: at 20:49

## 2025-02-28 RX ADMIN — DEXTROSE MONOHYDRATE: 50 INJECTION, SOLUTION INTRAVENOUS at 21:45

## 2025-02-28 RX ADMIN — HEPARIN SODIUM 5000 UNITS: 5000 INJECTION INTRAVENOUS; SUBCUTANEOUS at 05:42

## 2025-02-28 RX ADMIN — PRAVASTATIN SODIUM 20 MG: 20 TABLET ORAL at 18:05

## 2025-02-28 RX ADMIN — ACETAMINOPHEN 650 MG: 325 TABLET ORAL at 03:14

## 2025-02-28 RX ADMIN — DONEPEZIL HYDROCHLORIDE 10 MG: 5 TABLET ORAL at 20:48

## 2025-02-28 RX ADMIN — LAMOTRIGINE 200 MG: 100 TABLET ORAL at 09:33

## 2025-02-28 ASSESSMENT — PAIN DESCRIPTION - DESCRIPTORS: DESCRIPTORS: ACHING

## 2025-02-28 ASSESSMENT — PAIN DESCRIPTION - FREQUENCY: FREQUENCY: INTERMITTENT

## 2025-02-28 ASSESSMENT — PAIN SCALES - GENERAL
PAINLEVEL_OUTOF10: 7
PAINLEVEL_OUTOF10: 0
PAINLEVEL_OUTOF10: 3
PAINLEVEL_OUTOF10: 0

## 2025-02-28 ASSESSMENT — PAIN DESCRIPTION - ORIENTATION: ORIENTATION: RIGHT

## 2025-02-28 ASSESSMENT — PAIN DESCRIPTION - LOCATION: LOCATION: LEG

## 2025-02-28 ASSESSMENT — PAIN DESCRIPTION - PAIN TYPE: TYPE: CHRONIC PAIN

## 2025-02-28 NOTE — CONSULTS
Sergio Newell  2/28/2025  [id number]    Chief Complaint: Influenza A    Subjective   HPI: Sergio Newell is a 89 y.o. male with PMHx notable for HTN, HLD, CAD, CKD, chronic cognitive impairment, chronic sensorimotor axonal neuropathy, bipolar disorder, neurogenic bladder s/p suprapubic catheter who presented on 2/25/2025 with generalized weakness and fatigue. He was found to have ESTEBAN, and was influenza A positive He was started on IV fluids, and Tamiflu.  Nephrology consulted for hyponatremia, diabetes insipidus.  Urology consulted for suprapubic catheter malfunction.  Palliative care consulted for goals of care discussion.     Patient reports he is doing well today. He is feeling somewhat stronger since admission, but still much weaker than his baseline especially in his legs. He also notes worsened bilateral upper extremity tremor. He denies any shortness of breath, fevers/chills.     Past Medical History:   Diagnosis Date    CAD (coronary artery disease) 01/01/2009    CABG    Chronic kidney disease     Diabetes insipidus     Hypercholesteremia     Hypertension        Past Surgical History:   Procedure Laterality Date    APPENDECTOMY  10-    CORONARY ARTERY BYPASS GRAFT      2009    HERNIA REPAIR      GROIN BOTH SIDES    KNEE SURGERY      RIGHT    RETINAL DETACHMENT SURGERY      BOTH    THYROID SURGERY      parathyroid tumor       Social History     Tobacco Use    Smoking status: Never    Smokeless tobacco: Never   Vaping Use    Vaping status: Never Used   Substance Use Topics    Alcohol use: Not Currently     Alcohol/week: 3.0 standard drinks of alcohol     Types: 1 Glasses of wine, 2 Drinks containing 0.5 oz of alcohol per week     Comment: The above numbers are drinks per month    Drug use: No     Prior Level of Function:   Modified independent for mobility with rolling walker, independent for most ADLs except wife assist with lower body dressing, requires assistance with all IADLs.  Lives with spouse in  impairment  Chronic sensorimotor axonal neuropathy  Chronic tremor, ataxia, gait instability  Bipolar disorder  Chronic neurogenic bladder s/p suprapubic catheter      Impairments: Generalized weakness and fatigue limiting independence with functional mobility and ADLs.     Plan:   In my opinion the patient will require acute inpatient rehabilitation and meets criteria for [facility] level care, once medically stable per primary and consulting services. Anticipate they will be able to tolerate 3 hours of therapy per day or 900 minutes per week in rehabilitation. The patient requires multidisciplinary rehabilitation treatment including medical management by a PM&R physician, 24 hour rehabilitation nursing, PT/OT/SLP, rehabilitation social work, and nutrition services. Patient and family also require education in post-hospital precautions and home exercise routine, adaptive techniques and deficit compensation strategies, strengthening and conditioning, equipment prescription and instructions in use. Provision of services in a less intensive environment risks significant complications and more limited functional outcomes.   - Defer to CM to provide referrals to patient/family for external facilities with available beds.     Nelson Harkins MD 2/28/2025, 10:20 AM    * This document was created using dictation software.  While all precautions were taken to ensure accuracy, errors may have occurred.  Please disregard any typographical errors.

## 2025-02-28 NOTE — CARE COORDINATION
PM&R referral received.  Chart reviewed and evaluating.  Discussed with patient/family and is willing to come to ARU.  Discussed with Dr. Harkins.  Patient is appropriate for PM&R.    CM/SW should provide patient/family with a list of externals IRF.      Laureano Knox, SUPRIYAN, [phone]

## 2025-02-28 NOTE — CONSULTS
Urology Consult Note  [facility]     Patient: Sergio Newell MRN: [id number]  Room/Bed: 5TN-5565/5565-01   YOB: 1935  Age/Sex: 89 y.o.male  Admission Date: 2/25/2025     Date of Service:  2/28/2025    Consulting Provider: Ben Mckeon PA-C   Admitting/Requesting Physician: Adam Lim MD  Primary Care Physician: Anabell Bronson MD    Reason for Consult: SP tube, leakage    ASSESSMENT/PLAN     88 yo male with hx of NGB s/p SPT. Is followed  by  urology. Admitted with pna, influenza A. Was having leaking around tube and per urethra. Exam today shows SP tube in great position, draining well. No significant leakage per urethra on exam early this morning. He tells me SPT was changed out this week, he gets this exchanged every month. His UA was not suspicious for infection. ASHLEY no hydronephrosis, non distended bladder.     Recommendations:  No significant  plans. Continue SPT, this was recently exchanged 2/25/25 and is draining well. Can irrigate catheter prn and continue oxybutynin for spasms/leakage. Continue monthly exchanges otherwise.     All patient questions were answered. He understands the plan as listed above.    HISTORY     Chief Complaint:   Chief Complaint   Patient presents with    Fatigue     Patient recent admitted via PNA and discharged yesterday; increased work of breath but feels weak. Patient brought by [facility]        History of Present Illness: Sergio Newell is a 89 y.o. male with pna. He has a hx of ngb. He is s/p SPT. He is followed by  urology. He was having some leakage around SPT and per urethra. This has been going on this admission. He tells me tube was recently exchanged. On exam it is draining well. Leakage is better controlled with oxybutynin and cath flushes.     Past Medical History:  He has a past medical history of CAD (coronary artery disease) (01/01/2009), Chronic kidney disease, Diabetes insipidus, Hypercholesteremia, and

## 2025-02-28 NOTE — CONSULTS
Nephrology Progress Note                                                                                                                                                                                                                                                                                                                                                               Office : [phone]     Fax :[phone]    Patient's Name: Sergio Newell  4:17 PM  2/28/2025    Reason for Consult:  ESTEBAN on CKD 3/4  Requesting Physician:  Anabell Bronson MD  Chief Complaint:    Chief Complaint   Patient presents with    Fatigue     Patient recent admitted via PNA and discharged yesterday; increased work of breath but feels weak. 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 Creatinine elevated to 2.7--> 2.5  - Avoid nephrotoxins  - Monitor renal labs    #Flu A  - tamiflu    #Dysphagia  - speech eval ongoing  - barium swallow tomorrow    #Dementia      History of Present Ilness:    Sergio Newell is a 89 y.o. male with a pmh of hyperlipidemia, hypertension, CAD, bipolar, dementia, history of syncope, hx of parathyroid adenoma, DI, and CKD.  He discharged from [facility] on 2/2 for following acute hospitalization for pneumonia.  He was placed on azithromycin and Rocephin and transition to oralcefpodoxine at discharge.  He presents today for evaluation of worsening generalized weakness, fatigue, drowsiness and inability to walk since his discharge home.  He is found to have influenza A, ESTEBAN and possible dehydration. He is  last 72 hours.  UA:  Recent Labs     02/25/25 2137   COLORU Yellow   CLARITYU Clear   GLUCOSEU Negative   BILIRUBINUR Negative   KETUA Negative   BLOODU LARGE*   PHUR 6.5   PROTEINU 100*   UROBILINOGEN 0.2   NITRU Negative   LEUKOCYTESUR TRACE*   URINETYPE Voided      Urine Microscopic:   Recent Labs     02/25/25 2137   BACTERIA None Seen   HYALCAST 1   WBCUA 5   RBCUA 10*     Urine Culture: No results for input(s): \"LABURIN\" in the last 72 hours.  Urine Chemistry:   Recent Labs     02/27/25  0930   NAUR 48         IMAGING:  Fluoroscopy modified barium swallow with video   Final Result   Swallowing mechanism grossly within normal limits without evidence of   aspiration.      Please see separate speech pathology report for full discussion of findings   and recommendations.         US RENAL COMPLETE   Final Result   Mild cortical thinning and scarring throughout both kidneys which are   otherwise normal size with increased echogenicity throughout the parenchyma   suggestive of medical renal disease.      Questionable tiny cysts scattered in the left kidney and possible renal   stones bilaterally with no hydronephrosis.      Nondistention of the bladder which is not well visualized.         CT HEAD WO CONTRAST   Final Result   No acute intracranial abnormality.         XR CHEST PORTABLE   Final Result   No acute cardiopulmonary disease.               Medical Decision Making:  The following items were considered in medical decision making:  Discussion of patient care with other providers  Reviewed clinical lab tests  Reviewed radiology tests  Reviewed other diagnostic tests/interventions    Will be discussed w/  Primary team     Thank you for allowing us to participate in care of Sergio Newell   Feel free to contact me,     Bashir Beauchamp MD   [facility]  Office : [phone] or through [employer]  Fax :[phone]

## 2025-02-28 NOTE — PLAN OF CARE
Problem: Skin/Tissue Integrity  Goal: Skin integrity remains intact  Description: 1.  Monitor for areas of redness and/or skin breakdown  2.  Assess vascular access sites hourly  3.  Every 4-6 hours minimum:  Change oxygen saturation probe site  4.  Every 4-6 hours:  If on nasal continuous positive airway pressure, respiratory therapy assess nares and determine need for appliance change or resting period  Outcome: Progressing     Problem: Safety - Adult  Goal: Free from fall injury  Outcome: Progressing     Problem: ABCDS Injury Assessment  Goal: Absence of physical injury  Outcome: Progressing     Problem: Neurosensory - Adult  Goal: Achieves stable or improved neurological status  Outcome: Progressing  Goal: Achieves maximal functionality and self care  Outcome: Progressing     Problem: Respiratory - Adult  Goal: Achieves optimal ventilation and oxygenation  Outcome: Progressing     Problem: Genitourinary - Adult  Goal: Absence of urinary retention  Outcome: Progressing  Goal: Urinary catheter remains patent  Outcome: Progressing     Problem: Infection - Adult  Goal: Absence of infection at discharge  Outcome: Progressing     Problem: Metabolic/Fluid and Electrolytes - Adult  Goal: Electrolytes maintained within normal limits  Outcome: Progressing     Problem: Skin/Tissue Integrity - Adult  Goal: Skin integrity remains intact  Description: 1.  Monitor for areas of redness and/or skin breakdown  2.  Assess vascular access sites hourly  3.  Every 4-6 hours minimum:  Change oxygen saturation probe site  4.  Every 4-6 hours:  If on nasal continuous positive airway pressure, respiratory therapy assess nares and determine need for appliance change or resting period  Outcome: Progressing     Problem: Musculoskeletal - Adult  Goal: Return mobility to safest level of function  Outcome: Progressing     Problem: Pain  Goal: Verbalizes/displays adequate comfort level or baseline comfort level  Outcome: Progressing

## 2025-02-28 NOTE — ACP (ADVANCE CARE PLANNING)
Advanced Care Planning Note.    Purpose of Encounter: Advanced care planning in light of dementia  Parties In Attendance: Patient, wife, son  Decisional Capacity: Limited  Subjective: Patient is shaky  Objective: Cr 2.5  Goals of Care Determination: Patient wants limited support (No CPR, no vent, consider surgery, no HD, no trach, no PEG)  Plan:  Tamiflu, IVF, Renal and PMR consults, PT/OT/SLP, ARU?  Code Status: Limited   Time spent on Advanced care Plannin minutes  Advanced Care Planning Documents: Completed advanced directives on chart, wife is the POA.    Josh Salgado MD  2025 4:17 PM

## 2025-02-28 NOTE — PROGRESS NOTES
Date of Admission: 2/25/2025. Hospital Day: 4       HOSPITAL COURSE  89 y.o. male with a pmh of Diabetes Insupidus  hypertension, CAD, bipolar, dementia, history of syncope and CKD admitted for worsening weakness, dysphagia,hypernatremia  He discharged from [facility] on 2/2 for following acute hospitalization for pneumonia.  Poor intake and appear to be in  declining health, Passed SLP eval, does not need PEG.  PMR evaluated for ARU.    Interval  History:   Patient is awake and shaky.  Weak.  Oriented to place and self.  No CP, SOB, HA or fevers.      Physical Exam     BP 94/61   Pulse 54   Temp 97.2 °F (36.2 °C) (Oral)   Resp 18   Ht 1.778 m (5' 10\")   Wt 63.5 kg (140 lb)   SpO2 91%   BMI 20.09 kg/m²     General appearance: No apparent distress, appears stated age and cooperative. Emaciated, very pleasant  Respiratory:  Normal respiratory effort. Clear to auscultation, bilaterally without Rales/Wheezes/Rhonchi.  Cardiovascular: Regular rate and rhythm with normal S1/S2 without murmurs, rubs or gallops.  Neuro: no focal deficit noted, A&O X 2 (not time)          Assessment/Plan:    #Hypernatremia  #Diabetes insipidus  #ESTEBAN  Remains on D5W and sodium is 140  Renal input appreciated      #Dysphagia  #Poor oral intake  Passed swallow eval.  Does not seem indicated to place PEG at this time     Pna  Influenza A infection. Recent bacterial pna .  Tamiflu Day 3/5    # Chronically elevated troponin.  Troponin around his baseline troponin in 50s.  No reported chest pain.  No acute ischemia on EKG.  Continue telemetry and trend troponin.     Dementia.  # Continue Aricept    #generalized weakness  From all above. PMR consulted to consider ARU      Active Hospital Problems    Diagnosis     Influenza A [J10.1]            DVT Prophylaxis:    Diet: ADULT DIET; Dysphagia - Minced and Moist; No Drinking Straws  Code Status: Limited      Dispo - ARU vs home with HC    Discussed with patient, nursing and

## 2025-02-28 NOTE — PLAN OF CARE
Problem: Skin/Tissue Integrity  Goal: Skin integrity remains intact  Description: 1.  Monitor for areas of redness and/or skin breakdown  2.  Assess vascular access sites hourly  3.  Every 4-6 hours minimum:  Change oxygen saturation probe site  4.  Every 4-6 hours:  If on nasal continuous positive airway pressure, respiratory therapy assess nares and determine need for appliance change or resting period  2/28/2025 0137 by Nick Monterroso RN  Outcome: Progressing  2/27/2025 1336 by Tania Dhaliwal RN  Outcome: Progressing     Problem: Safety - Adult  Goal: Free from fall injury  2/28/2025 0137 by Nick Monterroso RN  Outcome: Progressing  2/27/2025 1336 by Tania Dhaliwal RN  Outcome: Progressing     Problem: ABCDS Injury Assessment  Goal: Absence of physical injury  2/28/2025 0137 by Nick Monterroso RN  Outcome: Progressing  2/27/2025 1336 by Tania Dhaliwal RN  Outcome: Progressing     Problem: Neurosensory - Adult  Goal: Achieves stable or improved neurological status  2/28/2025 0137 by Nick Monterroso RN  Outcome: Progressing  2/27/2025 1336 by Tania Dhaliwal RN  Outcome: Progressing  Goal: Achieves maximal functionality and self care  2/28/2025 0137 by Nick Monterroso RN  Outcome: Progressing  2/27/2025 1336 by Tania Dhaliwal RN  Outcome: Progressing     Problem: Respiratory - Adult  Goal: Achieves optimal ventilation and oxygenation  2/28/2025 0137 by Nick Monterroso RN  Outcome: Progressing  2/27/2025 1336 by Tania Dhaliwal RN  Outcome: Progressing     Problem: Genitourinary - Adult  Goal: Absence of urinary retention  2/28/2025 0137 by Nick Monterroso RN  Outcome: Progressing  2/27/2025 1336 by Tania Dhaliwal RN  Outcome: Progressing  Goal: Urinary catheter remains patent  2/28/2025 0137 by Nick Monterroso RN  Outcome: Progressing  2/27/2025 1336 by Tania Dhaliwal RN  Outcome: Progressing     Problem: Infection - Adult  Goal: Absence of infection at discharge  2/28/2025 0137 by Nick Monterroso  RN  Outcome: Progressing  2/27/2025 1336 by Tania Dhaliwal RN  Outcome: Progressing     Problem: Metabolic/Fluid and Electrolytes - Adult  Goal: Electrolytes maintained within normal limits  2/28/2025 0137 by Nick Monterroso RN  Outcome: Progressing  2/27/2025 1336 by Tania Dhaliwal RN  Outcome: Not Progressing     Problem: Skin/Tissue Integrity - Adult  Goal: Skin integrity remains intact  Description: 1.  Monitor for areas of redness and/or skin breakdown  2.  Assess vascular access sites hourly  3.  Every 4-6 hours minimum:  Change oxygen saturation probe site  4.  Every 4-6 hours:  If on nasal continuous positive airway pressure, respiratory therapy assess nares and determine need for appliance change or resting period  2/28/2025 0137 by Nick Monterroso RN  Outcome: Progressing  2/27/2025 1336 by Tania Dhaliwal RN  Outcome: Progressing     Problem: Musculoskeletal - Adult  Goal: Return mobility to safest level of function  2/28/2025 0137 by Nick Monterroso RN  Outcome: Progressing  2/27/2025 1336 by Tania Dhaliwal RN  Outcome: Progressing     Problem: Metabolic/Fluid and Electrolytes - Adult  Goal: Electrolytes maintained within normal limits  2/28/2025 0137 by Nick Monterroso RN  Outcome: Progressing  2/27/2025 1336 by Tania Dhaliwal RN  Outcome: Not Progressing

## 2025-02-28 NOTE — PROGRESS NOTES
Shift assessment completed.  Routine vitals obtained.  No scheduled medications given at this time.  Patient is awake, alert and oriented.  Respirations are easy and unlabored. Patient does not appear to be in distress.  Resting comfortably at this time.  Call light within reach.  Bed in lowest position and locked.  Family at bedside.

## 2025-02-28 NOTE — PROCEDURES
Facility/Department: [address] ONCOLOGY  MODIFIED BARIUM SWALLOW EVALUATION    Patient: Sergio Newell   : 1935   MRN: [id number]      Evaluation Date: 2025   Admitting Diagnosis: Hyperchloremia [E87.8]  Hypermagnesemia [E83.41]  Hypernatremia [E87.0]  Influenza A [J10.1]  Elevated troponin [R79.89]  Elevated brain natriuretic peptide (BNP) level [R79.89]  Acute encephalopathy [G93.40]  Acute kidney injury superimposed on CKD [N17.9, N18.9]  Treatment Diagnosis: Dysphagia   Pain:  Denies                           Ordering MD: Dr. Salgado   Radiologist: Dr. Wright   Date of Evaluation: 2025  Type of Study: Modified Barium Swallowing Study (MBS)  Diet Prior to Study:  Dysphagia II Minced and Moist  Thin liquids          Impression:  Modified Barium Swallow evaluation completed on 2025. Pt upright in chair, kyphotic posture noted. Patient presents with oropharyngeal dysphagia secondary to prolonged/disorganized mastication, premature bolus loss, delayed swallow onset, decreased pharyngoesophageal segment opening, decreased laryngeal elevation complicated by posture, advanced age and esophageal dysphagia resulting in observed episodic laryngeal penetration of thin liquids and impaired mastication with decreased oral clearing of solids. With soft solids pt demonstrated prolonged/disorganized mastication with multiple swallows required to clear oral cavity and oral residue. With thin liquids pharyngeal pooling was assessed with delayed swallow onset with intermittent laryngeal penetration that appeared to clear from the airway. No aspiration was viewed but intermittent penetration may contribute to pt's coughing. Pharyngeal clearing was grossly adequate. Throughout study retained barium was noted in esophagus but no backflow to pharynx. Pt may benefit from reflux precaution and GI follow-up, see below for further recommendations.   Aspiration/Penetration Risk:  Mild     Recommendations:    Diet  ongoing learning   [] No evidence of comprehension     Timed Code Treatment:0    Total Treatment Time: 25    Signature:  Brea Mcgraw MA CCC-SLP #[id number]  Speech Language Pathologist

## 2025-02-28 NOTE — ACP (ADVANCE CARE PLANNING)
Purpose of Encounter: Advanced care planning in light of hospitalization for weakness, ESTEBAN and hypernatremia  Parties in attendance: :  Julio Cesar Segundo MD,   Decisional Capacity:No  Objective: to determine goals of care   Goals of Care Determinations: wife says she has acp paperwork but says he will be DNR  but apart from that wants all treatment including peg tub for eating/fluid intake as he looses a lot of water from DI. Hopes to get him to ARU or home although understands that may be difficult given his level of chronic and acute illness  Code Status: Limited  Time Spent on Advanced Planning Documents: 21  mins.  Advanced Care Planning Documents:   Completed advances directives, advanced directives in chart. POA  wife Kala

## 2025-03-01 LAB
ALBUMIN SERPL-MCNC: 2.9 G/DL (ref 3.4–5)
ALBUMIN SERPL-MCNC: 3 G/DL (ref 3.4–5)
ALBUMIN SERPL-MCNC: 3 G/DL (ref 3.4–5)
ANION GAP SERPL CALCULATED.3IONS-SCNC: 10 MMOL/L (ref 3–16)
ANION GAP SERPL CALCULATED.3IONS-SCNC: 11 MMOL/L (ref 3–16)
ANION GAP SERPL CALCULATED.3IONS-SCNC: 13 MMOL/L (ref 3–16)
BACTERIA BLD CULT ORG #2: NORMAL
BACTERIA BLD CULT: NORMAL
BUN SERPL-MCNC: 33 MG/DL (ref 7–20)
BUN SERPL-MCNC: 33 MG/DL (ref 7–20)
BUN SERPL-MCNC: 37 MG/DL (ref 7–20)
CALCIUM SERPL-MCNC: 8.4 MG/DL (ref 8.3–10.6)
CALCIUM SERPL-MCNC: 8.5 MG/DL (ref 8.3–10.6)
CALCIUM SERPL-MCNC: 8.7 MG/DL (ref 8.3–10.6)
CHLORIDE SERPL-SCNC: 109 MMOL/L (ref 99–110)
CHLORIDE SERPL-SCNC: 110 MMOL/L (ref 99–110)
CHLORIDE SERPL-SCNC: 111 MMOL/L (ref 99–110)
CO2 SERPL-SCNC: 16 MMOL/L (ref 21–32)
CO2 SERPL-SCNC: 19 MMOL/L (ref 21–32)
CO2 SERPL-SCNC: 19 MMOL/L (ref 21–32)
CREAT SERPL-MCNC: 2.4 MG/DL (ref 0.8–1.3)
GFR SERPLBLD CREATININE-BSD FMLA CKD-EPI: 25 ML/MIN/{1.73_M2}
GLUCOSE SERPL-MCNC: 102 MG/DL (ref 70–99)
GLUCOSE SERPL-MCNC: 103 MG/DL (ref 70–99)
GLUCOSE SERPL-MCNC: 116 MG/DL (ref 70–99)
PHOSPHATE SERPL-MCNC: 3.4 MG/DL (ref 2.5–4.9)
PHOSPHATE SERPL-MCNC: 3.4 MG/DL (ref 2.5–4.9)
PHOSPHATE SERPL-MCNC: 3.5 MG/DL (ref 2.5–4.9)
POTASSIUM SERPL-SCNC: 4.2 MMOL/L (ref 3.5–5.1)
POTASSIUM SERPL-SCNC: 4.3 MMOL/L (ref 3.5–5.1)
POTASSIUM SERPL-SCNC: 4.3 MMOL/L (ref 3.5–5.1)
SODIUM SERPL-SCNC: 135 MMOL/L (ref 136–145)
SODIUM SERPL-SCNC: 141 MMOL/L (ref 136–145)
SODIUM SERPL-SCNC: 142 MMOL/L (ref 136–145)

## 2025-03-01 PROCEDURE — 6360000002 HC RX W HCPCS: Performed by: NURSE PRACTITIONER

## 2025-03-01 PROCEDURE — 2500000003 HC RX 250 WO HCPCS: Performed by: NURSE PRACTITIONER

## 2025-03-01 PROCEDURE — 36415 COLL VENOUS BLD VENIPUNCTURE: CPT

## 2025-03-01 PROCEDURE — 99233 SBSQ HOSP IP/OBS HIGH 50: CPT | Performed by: INTERNAL MEDICINE

## 2025-03-01 PROCEDURE — 1200000000 HC SEMI PRIVATE

## 2025-03-01 PROCEDURE — 6370000000 HC RX 637 (ALT 250 FOR IP): Performed by: STUDENT IN AN ORGANIZED HEALTH CARE EDUCATION/TRAINING PROGRAM

## 2025-03-01 PROCEDURE — 6370000000 HC RX 637 (ALT 250 FOR IP): Performed by: NURSE PRACTITIONER

## 2025-03-01 PROCEDURE — 80069 RENAL FUNCTION PANEL: CPT

## 2025-03-01 PROCEDURE — 2580000003 HC RX 258: Performed by: INTERNAL MEDICINE

## 2025-03-01 RX ORDER — AMILORIDE HYDROCHLORIDE 5 MG/1
5 TABLET ORAL DAILY
Status: DISCONTINUED | OUTPATIENT
Start: 2025-03-01 | End: 2025-03-02 | Stop reason: HOSPADM

## 2025-03-01 RX ORDER — OXYBUTYNIN CHLORIDE 5 MG/1
5 TABLET ORAL 3 TIMES DAILY
Qty: 90 TABLET | Refills: 0
Start: 2025-03-01

## 2025-03-01 RX ORDER — LITHIUM CARBONATE 300 MG/1
300 CAPSULE ORAL
Status: DISCONTINUED | OUTPATIENT
Start: 2025-03-02 | End: 2025-03-02 | Stop reason: HOSPADM

## 2025-03-01 RX ADMIN — CEFDINIR 300 MG: 300 CAPSULE ORAL at 08:35

## 2025-03-01 RX ADMIN — LITHIUM CARBONATE 300 MG: 300 CAPSULE ORAL at 08:35

## 2025-03-01 RX ADMIN — LAMOTRIGINE 200 MG: 100 TABLET ORAL at 08:35

## 2025-03-01 RX ADMIN — OXYBUTYNIN CHLORIDE 5 MG: 5 TABLET ORAL at 13:47

## 2025-03-01 RX ADMIN — HEPARIN SODIUM 5000 UNITS: 5000 INJECTION INTRAVENOUS; SUBCUTANEOUS at 05:24

## 2025-03-01 RX ADMIN — OXYBUTYNIN CHLORIDE 5 MG: 5 TABLET ORAL at 08:35

## 2025-03-01 RX ADMIN — OXYBUTYNIN CHLORIDE 5 MG: 5 TABLET ORAL at 19:54

## 2025-03-01 RX ADMIN — ALLOPURINOL 50 MG: 100 TABLET ORAL at 08:35

## 2025-03-01 RX ADMIN — PRAVASTATIN SODIUM 20 MG: 20 TABLET ORAL at 17:19

## 2025-03-01 RX ADMIN — LEVOTHYROXINE SODIUM 25 MCG: 0.03 TABLET ORAL at 05:24

## 2025-03-01 RX ADMIN — DONEPEZIL HYDROCHLORIDE 10 MG: 5 TABLET ORAL at 19:54

## 2025-03-01 RX ADMIN — OSELTAMIVIR 30 MG: 30 CAPSULE ORAL at 08:35

## 2025-03-01 RX ADMIN — DEXTROSE MONOHYDRATE: 50 INJECTION, SOLUTION INTRAVENOUS at 07:26

## 2025-03-01 RX ADMIN — HEPARIN SODIUM 5000 UNITS: 5000 INJECTION INTRAVENOUS; SUBCUTANEOUS at 13:47

## 2025-03-01 RX ADMIN — AMILORIDE HYDROCHLORIDE 5 MG: 5 TABLET ORAL at 18:12

## 2025-03-01 RX ADMIN — ASPIRIN 81 MG: 81 TABLET, COATED ORAL at 08:35

## 2025-03-01 RX ADMIN — Medication 10 ML: at 08:35

## 2025-03-01 RX ADMIN — DEXTROSE MONOHYDRATE: 50 INJECTION, SOLUTION INTRAVENOUS at 18:19

## 2025-03-01 RX ADMIN — ACETAMINOPHEN 650 MG: 325 TABLET ORAL at 19:54

## 2025-03-01 RX ADMIN — Medication 1000 MCG: at 17:19

## 2025-03-01 RX ADMIN — HEPARIN SODIUM 5000 UNITS: 5000 INJECTION INTRAVENOUS; SUBCUTANEOUS at 20:04

## 2025-03-01 ASSESSMENT — PAIN SCALES - GENERAL
PAINLEVEL_OUTOF10: 0

## 2025-03-01 NOTE — PROGRESS NOTES
Attempt to get patient from bed to bedside commode x2 with Shelia Steady and unsuccessful.  Pt has severe tremors and is max assist.  Incontinence and crouch care provided.  Shift assessment completed.  Routine vitals obtained.  Scheduled medications given.  Patient is awake, alert and oriented with periods of confusion noted.  Respirations are easy and unlabored. Patient does not appear to be in distress.  Resting comfortably at this time.  Call light within reach.  Bed in lowest position and locked.

## 2025-03-01 NOTE — PROGRESS NOTES
Attempted to contact patients wife, daughters Aniyah and Dorota, about bring in the medication, amiloride, for pharmacy to dose. All three attempts went to voicemail. Voice message left for louis Dill.

## 2025-03-01 NOTE — DISCHARGE SUMMARY
Hospital Medicine Discharge Summary    Patient: Sergio Newell     Gender: male  : 1935   Age: 89 y.o.  MRN: [id number]    Admitting Physician: Adam Lim MD  Discharge Physician: Josh Salgado MD     Code Status: Limited     Admit Date: 2025   Discharge Date:   3/1/25    Disposition:  ARU    Discharge Diagnoses:    Active Hospital Problems    Diagnosis Date Noted    Influenza A [J10.1] 2025       Follow-up appointments:  one week    Outpatient to do list: F/U with PCP, Renal and Urology    Condition at Discharge:  Stable    Hospital Course:   89 y.o. male with a pmh of Diabetes Insupidus  hypertension, CAD, bipolar, dementia, history of syncope and CKD admitted for worsening weakness, dysphagia,hypernatremia  He discharged from [facility] on  for following acute hospitalization for pneumonia.  Had poor intake and appeared to be in declining health.  Found to have Influenza A.  Finished course of Tamiflu.  Passed SLP eval, does not need PEG.  PMR evaluated for ARU. Will need ARU at outside facility as [facility] ARU is full. Code status is limited.  Resumed on Amiloride for DI.  F/U with PCP, Renal and Urology.    Discharge Medications:   Current Discharge Medication List        START taking these medications    Details   oxyBUTYnin (DITROPAN) 5 MG tablet Take 1 tablet by mouth 3 times daily  Qty: 90 tablet, Refills: 0           Current Discharge Medication List        Current Discharge Medication List        CONTINUE these medications which have NOT CHANGED    Details   donepezil (ARICEPT) 10 MG tablet Take 1 tablet by mouth nightly      pravastatin (PRAVACHOL) 20 MG tablet Take 1 tablet by mouth every evening      allopurinol (ZYLOPRIM) 100 MG tablet Take 0.5 tablets by mouth daily      aMILoride (MIDAMOR) 5 MG tablet Take 2 tablets by mouth daily  Qty: 30 tablet, Refills: 0      lamoTRIgine (LAMICTAL) 200 MG tablet Take 1 tablet by mouth daily      lithium 300 MG  with increased echogenicity diffusely throughout both kidneys.  There are tiny cysts scattered in left kidney which are ill-defined.  There is some questionable tiny echogenic foci scattered in both kidneys which could represent tiny stones but are ill-defined.  No hydronephrosis is seen. Bladder: The bladder is nondistended and not well visualized.     Mild cortical thinning and scarring throughout both kidneys which are otherwise normal size with increased echogenicity throughout the parenchyma suggestive of medical renal disease. Questionable tiny cysts scattered in the left kidney and possible renal stones bilaterally with no hydronephrosis. Nondistention of the bladder which is not well visualized.     CT HEAD WO CONTRAST    Result Date: 2/25/2025  EXAMINATION: CT OF THE HEAD WITHOUT CONTRAST  2/25/2025 9:52 pm TECHNIQUE: CT of the head was performed without the administration of intravenous contrast. Automated exposure control, iterative reconstruction, and/or weight based adjustment of the mA/kV was utilized to reduce the radiation dose to as low as reasonably achievable. COMPARISON: 11/04/2023 HISTORY: ORDERING SYSTEM PROVIDED HISTORY: Confusion TECHNOLOGIST PROVIDED HISTORY: If patient is on cardiac monitor and/or pulse ox, they may be taken off cardiac monitor and pulse ox, left on O2 if currently on. All monitors reattached when patient returns to room. Has a \"code stroke\" or \"stroke alert\" been called?->No Reason for exam:->Confusion Reason for Exam: Confusion FINDINGS: BRAIN/VENTRICLES: There is no acute intracranial hemorrhage, mass effect or midline shift.  No abnormal extra-axial fluid collection.  The gray-white differentiation is maintained without evidence of an acute infarct.  There is no evidence of hydrocephalus. ORBITS: The visualized portion of the orbits demonstrate no acute abnormality. SINUSES: Mild scattered paranasal sinus mucosal thickening.  No mastoid effusion. SOFT TISSUES/SKULL:  No

## 2025-03-01 NOTE — PROGRESS NOTES
Home med, Midamor, supplied per family.  Med taken to pharmacy for inspection and label.  Pt med given and stored in lock cubby in pt's room.

## 2025-03-01 NOTE — CARE COORDINATION
Discharge Planning:     (GUILLERMO)    Spoke with Sena [phone] with [facility] for admission to ARU. Gave medical information and made aware. They are unable to accept the pt today, but may be able to accept him tomorrow. Spoke with the pt and his son at bedside and they would like a place that is easier for the pt's wife to drive as they live in [place]. CM did call [facility] [phone] and left a voicemail for that facility as well. GUILLERMO will continue to follow through [place].      Electronically signed by Destinee Gray RN on 3/1/2025 at 2:20 PM'

## 2025-03-01 NOTE — PROGRESS NOTES
Sena Pollock RN, CRRN, here from [facility] per Destinee, , recommendation for acceptance to their acute rehab unit.  Stated that after discussion with  as well as family (wife & son) is able to accept patient to their acute rehab unit.  Stated will have Destinee, set up transport for patient tomorrow afternoon to be transported from unit to acute rehab unit at [facility].  Any further questions or concerns call Sena at [phone].

## 2025-03-01 NOTE — PROGRESS NOTES
[J10.1]            DVT Prophylaxis:    Diet: ADULT DIET; Dysphagia - Minced and Moist; No Drinking Straws  Code Status: Limited      Dispo - ARU     Discussed with patient, nursing and CM.    Has LARGE volume diuresis from DI.  Awaiting plan per Renal.    ARU when ok with Renal.    Chief Complaint:   Chief Complaint   Patient presents with    Fatigue     Patient recent admitted via PNA and discharged yesterday; increased work of breath but feels weak. Patient brought by [facility]              Medications:  Reviewed    Infusion Medications    dextrose 100 mL/hr at 03/01/25 0919    sodium chloride       Scheduled Medications    oxyBUTYnin  5 mg Oral TID    allopurinol  50 mg Oral Daily    aspirin  81 mg Oral Daily    vitamin B-12  1,000 mcg Oral QPM    donepezil  10 mg Oral Nightly    lamoTRIgine  200 mg Oral Daily    levothyroxine  25 mcg Oral Daily    lithium  300 mg Oral Daily    pravastatin  20 mg Oral QPM    sodium chloride flush  5-40 mL IntraVENous 2 times per day    heparin (porcine)  5,000 Units SubCUTAneous 3 times per day    cefdinir  300 mg Oral Daily    oseltamivir  30 mg Oral Daily     PRN Meds: sodium chloride flush, sodium chloride, ondansetron **OR** ondansetron, polyethylene glycol, acetaminophen **OR** acetaminophen      Intake/Output Summary (Last 24 hours) at 3/1/2025 1014  Last data filed at 3/1/2025 0919  Gross per 24 hour   Intake 3839.7 ml   Output 6358 ml   Net -2518.3 ml                 Labs:   Recent Labs     02/26/25  1112 02/27/25  0551   WBC 14.8* 10.6   HGB 10.6* 10.4*   HCT 32.7* 32.4*    283     Recent Labs     02/28/25  1521 03/01/25  0004 03/01/25  0929    141 142   K 4.4 4.3 4.2   * 111* 110   CO2 15* 19* 19*   BUN 36* 37* 33*   CREATININE 2.5* 2.4* 2.4*   CALCIUM 8.4 8.5 8.4   PHOS 3.6 3.4 3.5     No results for input(s): \"AST\", \"ALT\", \"BILIDIR\", \"BILITOT\", \"ALKPHOS\" in the last 72 hours.    No results for input(s): \"INR\" in the last 72 hours.  No results for  input(s): \"CKTOTAL\", \"TROPHS\" in the last 72 hours.      Urinalysis:      Lab Results   Component Value Date/Time    NITRU Negative 02/25/2025 09:37 PM    WBCUA 5 02/25/2025 09:37 PM    BACTERIA None Seen 02/25/2025 09:37 PM    RBCUA 10 02/25/2025 09:37 PM    RBCUA NEGATIVE 11/15/2017 12:30 PM    BLOODU LARGE 02/25/2025 09:37 PM    GLUCOSEU Negative 02/25/2025 09:37 PM    GLUCOSEU NEGATIVE 05/10/2012 02:25 PM       Radiology:  Fluoroscopy modified barium swallow with video   Final Result   Swallowing mechanism grossly within normal limits without evidence of   aspiration.      Please see separate speech pathology report for full discussion of findings   and recommendations.         US RENAL COMPLETE   Final Result   Mild cortical thinning and scarring throughout both kidneys which are   otherwise normal size with increased echogenicity throughout the parenchyma   suggestive of medical renal disease.      Questionable tiny cysts scattered in the left kidney and possible renal   stones bilaterally with no hydronephrosis.      Nondistention of the bladder which is not well visualized.         CT HEAD WO CONTRAST   Final Result   No acute intracranial abnormality.         XR CHEST PORTABLE   Final Result   No acute cardiopulmonary disease.             IP CONSULT TO CASE MANAGEMENT  IP CONSULT TO PALLIATIVE CARE  IP CONSULT TO SPIRITUAL SERVICES  IP CONSULT TO PHYSICAL MEDICINE REHAB  IP CONSULT TO UROLOGY      Josh Salgado MD

## 2025-03-01 NOTE — PLAN OF CARE
Problem: Skin/Tissue Integrity  Goal: Skin integrity remains intact  Description: 1.  Monitor for areas of redness and/or skin breakdown  2.  Assess vascular access sites hourly  3.  Every 4-6 hours minimum:  Change oxygen saturation probe site  4.  Every 4-6 hours:  If on nasal continuous positive airway pressure, respiratory therapy assess nares and determine need for appliance change or resting period  Outcome: Progressing  Flowsheets (Taken 3/1/2025 0816)  Skin Integrity Remains Intact: Monitor for areas of redness and/or skin breakdown     Problem: Safety - Adult  Goal: Free from fall injury  Outcome: Progressing     Problem: ABCDS Injury Assessment  Goal: Absence of physical injury  Outcome: Progressing     Problem: Neurosensory - Adult  Goal: Achieves stable or improved neurological status  Outcome: Progressing  Flowsheets (Taken 3/1/2025 0823)  Achieves stable or improved neurological status: Assess for and report changes in neurological status  Goal: Achieves maximal functionality and self care  Outcome: Progressing  Flowsheets (Taken 3/1/2025 0823)  Achieves maximal functionality and self care: Monitor swallowing and airway patency with patient fatigue and changes in neurological status     Problem: Respiratory - Adult  Goal: Achieves optimal ventilation and oxygenation  Outcome: Progressing     Problem: Genitourinary - Adult  Goal: Absence of urinary retention  Outcome: Progressing  Goal: Urinary catheter remains patent  Outcome: Progressing     Problem: Infection - Adult  Goal: Absence of infection at discharge  Outcome: Progressing     Problem: Metabolic/Fluid and Electrolytes - Adult  Goal: Electrolytes maintained within normal limits  Outcome: Progressing     Problem: Skin/Tissue Integrity - Adult  Goal: Skin integrity remains intact  Description: 1.  Monitor for areas of redness and/or skin breakdown  2.  Assess vascular access sites hourly  3.  Every 4-6 hours minimum:  Change oxygen saturation  probe site  4.  Every 4-6 hours:  If on nasal continuous positive airway pressure, respiratory therapy assess nares and determine need for appliance change or resting period  Outcome: Progressing  Flowsheets (Taken 3/1/2025 0816)  Skin Integrity Remains Intact: Monitor for areas of redness and/or skin breakdown     Problem: Musculoskeletal - Adult  Goal: Return mobility to safest level of function  Outcome: Progressing     Problem: Pain  Goal: Verbalizes/displays adequate comfort level or baseline comfort level  Outcome: Progressing

## 2025-03-01 NOTE — PROGRESS NOTES
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 Creatinine elevated to 2.7--> 2.5  - Avoid nephrotoxins  - Monitor renal labs    #Flu A  - tamiflu    #Dysphagia  - speech eval ongoing  - barium swallow tomorrow    #Dementia      History of Present Ilness:    Sergio Newell is a 89 y.o. male with a pmh of hyperlipidemia, hypertension, CAD, bipolar, dementia, history of syncope, hx of parathyroid adenoma, DI, and CKD.  He discharged from [facility] on 2/2 for following acute hospitalization for pneumonia.  He was placed on azithromycin and Rocephin and transition to oralcefpodoxine at discharge.  He presents today for evaluation of worsening generalized weakness, fatigue, drowsiness and inability to walk since his discharge home.  He is found to have influenza A, ESTEBAN and

## 2025-03-02 VITALS
OXYGEN SATURATION: 96 % | SYSTOLIC BLOOD PRESSURE: 121 MMHG | BODY MASS INDEX: 20.23 KG/M2 | HEART RATE: 70 BPM | RESPIRATION RATE: 18 BRPM | WEIGHT: 141.31 LBS | HEIGHT: 70 IN | DIASTOLIC BLOOD PRESSURE: 73 MMHG | TEMPERATURE: 97.2 F

## 2025-03-02 LAB
ALBUMIN SERPL-MCNC: 2.9 G/DL (ref 3.4–5)
ALBUMIN SERPL-MCNC: 3 G/DL (ref 3.4–5)
ANION GAP SERPL CALCULATED.3IONS-SCNC: 11 MMOL/L (ref 3–16)
ANION GAP SERPL CALCULATED.3IONS-SCNC: 11 MMOL/L (ref 3–16)
BUN SERPL-MCNC: 32 MG/DL (ref 7–20)
BUN SERPL-MCNC: 32 MG/DL (ref 7–20)
CALCIUM SERPL-MCNC: 8.1 MG/DL (ref 8.3–10.6)
CALCIUM SERPL-MCNC: 8.1 MG/DL (ref 8.3–10.6)
CHLORIDE SERPL-SCNC: 112 MMOL/L (ref 99–110)
CHLORIDE SERPL-SCNC: 112 MMOL/L (ref 99–110)
CO2 SERPL-SCNC: 19 MMOL/L (ref 21–32)
CO2 SERPL-SCNC: 19 MMOL/L (ref 21–32)
CREAT SERPL-MCNC: 2.4 MG/DL (ref 0.8–1.3)
CREAT SERPL-MCNC: 2.4 MG/DL (ref 0.8–1.3)
GFR SERPLBLD CREATININE-BSD FMLA CKD-EPI: 25 ML/MIN/{1.73_M2}
GFR SERPLBLD CREATININE-BSD FMLA CKD-EPI: 25 ML/MIN/{1.73_M2}
GLUCOSE SERPL-MCNC: 107 MG/DL (ref 70–99)
GLUCOSE SERPL-MCNC: 90 MG/DL (ref 70–99)
PHOSPHATE SERPL-MCNC: 3.3 MG/DL (ref 2.5–4.9)
PHOSPHATE SERPL-MCNC: 3.5 MG/DL (ref 2.5–4.9)
POTASSIUM SERPL-SCNC: 4 MMOL/L (ref 3.5–5.1)
POTASSIUM SERPL-SCNC: 4.1 MMOL/L (ref 3.5–5.1)
SODIUM SERPL-SCNC: 142 MMOL/L (ref 136–145)
SODIUM SERPL-SCNC: 142 MMOL/L (ref 136–145)

## 2025-03-02 PROCEDURE — 80069 RENAL FUNCTION PANEL: CPT

## 2025-03-02 PROCEDURE — 36415 COLL VENOUS BLD VENIPUNCTURE: CPT

## 2025-03-02 PROCEDURE — 2500000003 HC RX 250 WO HCPCS: Performed by: NURSE PRACTITIONER

## 2025-03-02 PROCEDURE — 6370000000 HC RX 637 (ALT 250 FOR IP): Performed by: INTERNAL MEDICINE

## 2025-03-02 PROCEDURE — 6370000000 HC RX 637 (ALT 250 FOR IP): Performed by: NURSE PRACTITIONER

## 2025-03-02 PROCEDURE — 6370000000 HC RX 637 (ALT 250 FOR IP): Performed by: STUDENT IN AN ORGANIZED HEALTH CARE EDUCATION/TRAINING PROGRAM

## 2025-03-02 PROCEDURE — 6360000002 HC RX W HCPCS: Performed by: NURSE PRACTITIONER

## 2025-03-02 PROCEDURE — 99233 SBSQ HOSP IP/OBS HIGH 50: CPT | Performed by: INTERNAL MEDICINE

## 2025-03-02 RX ORDER — SODIUM BICARBONATE 650 MG/1
650 TABLET ORAL 2 TIMES DAILY
Status: DISCONTINUED | OUTPATIENT
Start: 2025-03-02 | End: 2025-03-02 | Stop reason: HOSPADM

## 2025-03-02 RX ADMIN — ASPIRIN 81 MG: 81 TABLET, COATED ORAL at 08:43

## 2025-03-02 RX ADMIN — OSELTAMIVIR 30 MG: 30 CAPSULE ORAL at 08:43

## 2025-03-02 RX ADMIN — AMILORIDE HYDROCHLORIDE 5 MG: 5 TABLET ORAL at 11:30

## 2025-03-02 RX ADMIN — LEVOTHYROXINE SODIUM 25 MCG: 0.03 TABLET ORAL at 04:46

## 2025-03-02 RX ADMIN — ALLOPURINOL 50 MG: 100 TABLET ORAL at 08:43

## 2025-03-02 RX ADMIN — Medication 10 ML: at 08:46

## 2025-03-02 RX ADMIN — SODIUM BICARBONATE 650 MG: 650 TABLET ORAL at 08:43

## 2025-03-02 RX ADMIN — OXYBUTYNIN CHLORIDE 5 MG: 5 TABLET ORAL at 08:43

## 2025-03-02 RX ADMIN — SODIUM BICARBONATE 650 MG: 650 TABLET ORAL at 04:45

## 2025-03-02 RX ADMIN — HEPARIN SODIUM 5000 UNITS: 5000 INJECTION INTRAVENOUS; SUBCUTANEOUS at 14:22

## 2025-03-02 RX ADMIN — LAMOTRIGINE 200 MG: 100 TABLET ORAL at 08:43

## 2025-03-02 RX ADMIN — OXYBUTYNIN CHLORIDE 5 MG: 5 TABLET ORAL at 14:22

## 2025-03-02 RX ADMIN — CEFDINIR 300 MG: 300 CAPSULE ORAL at 08:43

## 2025-03-02 RX ADMIN — HEPARIN SODIUM 5000 UNITS: 5000 INJECTION INTRAVENOUS; SUBCUTANEOUS at 04:45

## 2025-03-02 NOTE — PROGRESS NOTES
Call received from Lea, , regarding pt discharge status.  Aware that pt is ready for discharge and needs time for  for transfer.  Stated will call with paperwork to give to ARU and  time.

## 2025-03-02 NOTE — DISCHARGE SUMMARY
kidneys with increased echogenicity diffusely throughout both kidneys.  There are tiny cysts scattered in left kidney which are ill-defined.  There is some questionable tiny echogenic foci scattered in both kidneys which could represent tiny stones but are ill-defined.  No hydronephrosis is seen. Bladder: The bladder is nondistended and not well visualized.     Mild cortical thinning and scarring throughout both kidneys which are otherwise normal size with increased echogenicity throughout the parenchyma suggestive of medical renal disease. Questionable tiny cysts scattered in the left kidney and possible renal stones bilaterally with no hydronephrosis. Nondistention of the bladder which is not well visualized.     CT HEAD WO CONTRAST    Result Date: 2/25/2025  EXAMINATION: CT OF THE HEAD WITHOUT CONTRAST  2/25/2025 9:52 pm TECHNIQUE: CT of the head was performed without the administration of intravenous contrast. Automated exposure control, iterative reconstruction, and/or weight based adjustment of the mA/kV was utilized to reduce the radiation dose to as low as reasonably achievable. COMPARISON: 11/04/2023 HISTORY: ORDERING SYSTEM PROVIDED HISTORY: Confusion TECHNOLOGIST PROVIDED HISTORY: If patient is on cardiac monitor and/or pulse ox, they may be taken off cardiac monitor and pulse ox, left on O2 if currently on. All monitors reattached when patient returns to room. Has a \"code stroke\" or \"stroke alert\" been called?->No Reason for exam:->Confusion Reason for Exam: Confusion FINDINGS: BRAIN/VENTRICLES: There is no acute intracranial hemorrhage, mass effect or midline shift.  No abnormal extra-axial fluid collection.  The gray-white differentiation is maintained without evidence of an acute infarct.  There is no evidence of hydrocephalus. ORBITS: The visualized portion of the orbits demonstrate no acute abnormality. SINUSES: Mild scattered paranasal sinus mucosal thickening.  No mastoid effusion. SOFT  TISSUES/SKULL:  No acute abnormality of the visualized skull or soft tissues.     No acute intracranial abnormality.     XR CHEST PORTABLE    Result Date: 2/25/2025  EXAMINATION: ONE XRAY VIEW OF THE CHEST 2/25/2025 7:06 pm COMPARISON: 11/04/2023. HISTORY: ORDERING SYSTEM PROVIDED HISTORY: SOB TECHNOLOGIST PROVIDED HISTORY: Reason for exam:->SOB Reason for Exam: SOB FINDINGS: The cardiomediastinal silhouette is stable status post median sternotomy. The lungs are clear.  No infiltrate, pleural fluid or evidence of overt failure.     No acute cardiopulmonary disease.       The patient was seen and examined on day of discharge and this discharge summary is in conjunction with any daily progress note from day of discharge.Time Spent on discharge is 35 minutes in the examination, evaluation, counseling and review of medications and discharge plan.      Note that more than 30 minutes was spent in preparing discharge papers, discussing discharge with patient, medication review, etc.       Signed:    Josh Salgado MD   3/2/2025      Thank you Anabell Bronson MD for the opportunity to be involved in this patient's care. If you have any questions or concerns please feel free to contact me at [facility]

## 2025-03-02 NOTE — PROGRESS NOTES
Shift assessment completed.  Routine vitals obtained.  Scheduled medications given crushed in pudding without difficulty.  Patient is awake, alert and oriented.  Respirations are easy and unlabored.  Resting comfortably at this time.  Call light within reach.  Bed in lowest position and locked.

## 2025-03-02 NOTE — PLAN OF CARE
Problem: Skin/Tissue Integrity  Goal: Skin integrity remains intact  Description: 1.  Monitor for areas of redness and/or skin breakdown  2.  Assess vascular access sites hourly  3.  Every 4-6 hours minimum:  Change oxygen saturation probe site  4.  Every 4-6 hours:  If on nasal continuous positive airway pressure, respiratory therapy assess nares and determine need for appliance change or resting period  3/2/2025 0143 by Nanci Chicas RN  Outcome: Progressing  3/1/2025 1448 by Fatmata Barrios RN  Outcome: Progressing  Flowsheets (Taken 3/1/2025 0816)  Skin Integrity Remains Intact: Monitor for areas of redness and/or skin breakdown     Problem: Safety - Adult  Goal: Free from fall injury  3/2/2025 0143 by Nanci Chicas RN  Outcome: Progressing  3/1/2025 1448 by Fatmata Barrios RN  Outcome: Progressing     Problem: ABCDS Injury Assessment  Goal: Absence of physical injury  3/2/2025 0143 by Nanci Chicas RN  Outcome: Progressing  3/1/2025 1448 by Fatmata Barrios RN  Outcome: Progressing     Problem: Neurosensory - Adult  Goal: Achieves stable or improved neurological status  3/2/2025 0143 by Nanci Chicas RN  Outcome: Progressing  3/1/2025 1448 by Fatmata Barrios RN  Outcome: Progressing  Flowsheets (Taken 3/1/2025 0823)  Achieves stable or improved neurological status: Assess for and report changes in neurological status  Goal: Achieves maximal functionality and self care  3/2/2025 0143 by Nanci Chicas RN  Outcome: Progressing  3/1/2025 1448 by Fatmata Barrios RN  Outcome: Progressing  Flowsheets (Taken 3/1/2025 0823)  Achieves maximal functionality and self care: Monitor swallowing and airway patency with patient fatigue and changes in neurological status     Problem: Respiratory - Adult  Goal: Achieves optimal ventilation and oxygenation  3/2/2025 0143 by Nanci Chicas RN  Outcome: Progressing  3/1/2025 1448 by Fatmata Barrios RN  Outcome: Progressing     Problem:

## 2025-03-02 NOTE — CARE COORDINATION
DISCHARGE ORDER  Date/Time 3/2/2025 2:26 PM  Completed by: Teresa Boeck, MURRAY, Case Management    Patient Name: Sergio Newell    : 1935      Admit order Date and Status: 2025  Noted discharge order. (verify MD's last order for status of admission/Traditional Medicare 3 [place] Inpatient qualifying stay required for SNF)    Confirmed discharge plan with:              Patient:  No              When pt confirms DC plan does any support person need to be contacted by CM No if yes who - spouse at bedside                      Discharge to Facility: [facility]   Facility phone number for staff giving report: [phone]    Pre-certification completed: yes   Hospital Exemption Notification (HENS) completed: NA   Discharge orders and Continuity of Care faxed to facility:  yes      Transportation:               Medical Transport explained with choice list offered to pt/family.                Choice:No(no preference)  Agency used: [employer]   time:   15:30      Pt/family/Nursing/Facility aware of  time:  Yes Names: DAVID/paperwork, Kala/spouse, Sena/[facility], Melany GAO  Ambulance form completed:  yes:      Date Last IMM Given: 3/2/2025    Comments: Pt is being d/c'd to [facility]U today. Pt's O2 sats are 96% on RA.    Discharge timeout done with Melany GAO. All discharge needs and concerns addressed.    Discharging nurse to complete LALA, reconcile AVS, and place final copy with patient's discharge packet. Discharging RN to ensure that written prescriptions for  Level II medications are sent with patient to the facility as per protocol.

## 2025-03-02 NOTE — PROGRESS NOTES
Call received from [facility] acute rehab RN for report.  Report given per this nurse and MURRAY Mosley.  Pick-up time for pt transport at 1530.  Wife, matt Armendariz.

## 2025-03-02 NOTE — PROGRESS NOTES
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 Creatinine elevated to 2.7--> 2.5  - Avoid nephrotoxins  - Monitor renal labs    #Flu A  - tamiflu    #Dysphagia  - speech eval ongoing  - barium swallow tomorrow    #Dementia      History of Present Ilness:    Sergio Newell is a 89 y.o. male with a pmh of hyperlipidemia, hypertension, CAD, bipolar, dementia, history of syncope, hx of parathyroid adenoma, DI, and CKD.  He discharged from [facility] on 2/2 for following acute hospitalization for pneumonia.  He was placed on azithromycin and Rocephin and transition to oralcefpodoxine at discharge.  He presents today for evaluation of worsening generalized weakness, fatigue, drowsiness and inability to walk since his discharge home.  He is found to have influenza A, ESTEBAN and possible dehydration. He is being admitted for further workup. We are consulted for ESTEBAN on CKD

## 2025-03-02 NOTE — PROGRESS NOTES
Date of Admission: 2/25/2025. Hospital Day: 6       HOSPITAL COURSE  89 y.o. male with a pmh of Diabetes Insupidus  hypertension, CAD, bipolar, dementia, history of syncope and CKD admitted for worsening weakness, dysphagia,hypernatremia  He discharged from [facility] on 2/2 for following acute hospitalization for pneumonia.  Poor intake and appear to be in  declining health, Passed SLP eval, does not need PEG.  PMR evaluated for ARU. Will need ARU at outside facility as [facility] ARU is full.    Interval  History:   Patient remains weak and shaky.  No CP, SOB, HA or fevers.  Net -7 L since admission.      Physical Exam     /73   Pulse 70   Temp 97.2 °F (36.2 °C) (Oral)   Resp 18   Ht 1.778 m (5' 10\")   Wt 64.1 kg (141 lb 5 oz)   SpO2 96%   BMI 20.28 kg/m²     General appearance: No apparent distress, appears stated age and cooperative. Emaciated, very pleasant  Respiratory:  Normal respiratory effort. Clear to auscultation, bilaterally without Rales/Wheezes/Rhonchi.  Cardiovascular: Regular rate and rhythm with normal S1/S2 without murmurs, rubs or gallops.  Neuro: no focal deficit noted, A&O X 2 (not time)          Assessment/Plan:    #Hypernatremia  #Diabetes insipidus   -Resume amiloride   -Off IVF   -Stable for DC to ARU  #ESTEBAN  Off IVF  Renal input appreciated      #Dysphagia  #Poor oral intake  Passed swallow eval.  Does not seem indicated to place PEG at this time     Pna  Influenza A infection. Recent bacterial pna .  Finished 5 days of Tamiflu    # Chronically elevated troponin.  Troponin around his baseline troponin in 50s.  No reported chest pain.  No acute ischemia on EKG.  Continue telemetry and trend troponin.     Dementia.  # Continue Aricept    #generalized weakness  From all above. PMR consulted recommend ARU at outside facility      Active Hospital Problems    Diagnosis     Influenza A [J10.1]            DVT Prophylaxis:    Diet: ADULT DIET; Dysphagia - Minced and

## 2025-03-02 NOTE — PROGRESS NOTES
Pt discharged off unit per EMT/ambulance per stretcher with suprapubic catheter/bag, discharge paperwork, pt supplied med, Midamor, and belongings.  Denies questions or concerns at this time.  All questions answered at this time.  Pt resting comfortably.

## 2025-03-02 NOTE — DISCHARGE INSTR - COC
Continuity of Care Form    Patient Name: Sergio Newell   :  1935  MRN:  [id number]    Admit date:  2025  Discharge date:  3/2/2025    Code Status Order: Limited   Advance Directives:   Advance Care Flowsheet Documentation             Admitting Physician:  Adam Lim MD  PCP: Anabell Bronson MD    Discharging Nurse: Melany RN/Shanta RN  Discharging Hospital Unit/Room#: 5TN-5565/5565-01  Discharging Unit Phone Number: [phone]    Emergency Contact:   Extended Emergency Contact Information  Primary Emergency Contact: Kala Newell  Address: [address] [place] [address]  Home Phone: [phone]  Mobile Phone: [phone]  Relation: Spouse  Secondary Emergency Contact: Aniyah Garcia  Mobile Phone: [phone]  Relation: Child    Past Surgical History:  Past Surgical History:   Procedure Laterality Date    APPENDECTOMY  10-    CORONARY ARTERY BYPASS GRAFT      2009    HERNIA REPAIR      GROIN BOTH SIDES    KNEE SURGERY      RIGHT    RETINAL DETACHMENT SURGERY      BOTH    THYROID SURGERY      parathyroid tumor       Immunization History:   Immunization History   Administered Date(s) Administered    COVID-19, MODERNA BLUE border, Primary or Immunocompromised, (age 12y+), IM, 100 mcg/0.5mL 2021, 2021, 11/15/2021    COVID-19, PFIZER Bivalent, DO NOT Dilute, (age 12y+), IM, 30 mcg/0.3 mL 10/18/2022    COVID-19, PFIZER GRAY top, DO NOT Dilute, (age 12 y+), IM, 30 mcg/0.3 mL 2022    COVID-19, PFIZER, (age 12y+), IM, 30mcg/0.3mL 2023, 2024    Influenza Virus Vaccine 10/30/2010    TDaP, ADACEL (age 10y-64y), BOOSTRIX (age 10y+), IM, 0.5mL 2021       Active Problems:  Patient Active Problem List   Diagnosis Code    Mixed hyperlipidemia E78.2    Essential hypertension I10    Coronary artery disease due to lipid rich plaque I25.10, I25.83    Hypercholesteremia E78.00    Chest pain R07.9    Bipolar disorder, in full  days.     Update Admission H&P: No change in H&P    PHYSICIAN SIGNATURE:  Electronically signed by Josh Salgado MD on 3/2/25 at 1:34 PM EST

## 2025-03-02 NOTE — PLAN OF CARE
Problem: Skin/Tissue Integrity  Goal: Skin integrity remains intact  Description: 1.  Monitor for areas of redness and/or skin breakdown  2.  Assess vascular access sites hourly  3.  Every 4-6 hours minimum:  Change oxygen saturation probe site  4.  Every 4-6 hours:  If on nasal continuous positive airway pressure, respiratory therapy assess nares and determine need for appliance change or resting period  3/2/2025 1514 by Fatmata Barrios RN  Outcome: Adequate for Discharge  3/2/2025 1513 by Fatmata Barrios RN  Outcome: Adequate for Discharge  3/2/2025 0143 by Nanci Chicas RN  Outcome: Progressing     Problem: Safety - Adult  Goal: Free from fall injury  3/2/2025 1514 by Fatmata Barrios RN  Outcome: Adequate for Discharge  3/2/2025 1513 by Fatmata Barrios RN  Outcome: Adequate for Discharge  3/2/2025 0143 by Nanci Chicas RN  Outcome: Progressing     Problem: ABCDS Injury Assessment  Goal: Absence of physical injury  3/2/2025 1514 by Fatmata Barrios RN  Outcome: Adequate for Discharge  3/2/2025 1513 by Fatmata Barrios RN  Outcome: Adequate for Discharge  3/2/2025 0143 by Nanci Chicas RN  Outcome: Progressing     Problem: Neurosensory - Adult  Goal: Achieves stable or improved neurological status  3/2/2025 1514 by Fatmata Barrios RN  Outcome: Adequate for Discharge  3/2/2025 1513 by Fatmata Barrios RN  Outcome: Adequate for Discharge  3/2/2025 0143 by Nanci Chicas RN  Outcome: Progressing  Goal: Achieves maximal functionality and self care  3/2/2025 1514 by Fatmata Barrios RN  Outcome: Adequate for Discharge  3/2/2025 1513 by Fatmata Barrios RN  Outcome: Adequate for Discharge  3/2/2025 0143 by Nanci Chicas RN  Outcome: Progressing     Problem: Respiratory - Adult  Goal: Achieves optimal ventilation and oxygenation  3/2/2025 1514 by Fatmata Barrios RN  Outcome: Adequate for Discharge  3/2/2025 0143 by Nanci Chicas RN  Outcome: Progressing     Problem:

## 2025-03-07 NOTE — PROGRESS NOTES
Physician Progress Note      PATIENT:               KEMAR BEE  CSN #:                  [id number]  :                       1935  ADMIT DATE:       2025 5:53 PM  DISCH DATE:        3/2/2025 10:40 AM  RESPONDING  PROVIDER #:        Josh Salgado MD          QUERY TEXT:    Patient was noted to have WBC 14.9, Temp 101.6 F, procalcitonin 0.34 and Resp   rate of 31. Patient was diagnosed with influenza A and treated with IVF's and   Tamiflu. Based on clinical indicators and treatment, can the patient's   condition be further specified as:      Risk Factors:  pmh of hyperlipidemia, hypertension, CAD, bipolar, dementia,   history of syncope and CKD.    Clinical Indicators:  WBC 14.9, Temp 101.6 F, procalcitonin 0.34 Resp rate 31    ED Provider note: \" Family states since coming home he has become severely   weak and fatigued.  He now will not walk.... Patient is moving his lower   extremities bilaterally but is extremely weak and can barely lift his legs off   of the bed... Patient is ill-appearing.  Temperature is 101.6 ?F rectally.\"    Treatment: Serial labs, UA, BC, CXR, IV LR 1L bolus, Tylenol, IV Azithromycin,   IV Rocephin, Tamiflu, Cefdinir  Options provided:  -- Sepsis secondary to influenza A, present on admission  -- Influenza A without Sepsis  -- Other - I will add my own diagnosis  -- Disagree - Not applicable / Not valid  -- Disagree - Clinically unable to determine / Unknown  -- Refer to Clinical Documentation Reviewer    PROVIDER RESPONSE TEXT:    Sepsis secondary to influenza A, present on admission    Query created by: Melly Vuong on 3/7/2025 8:34 AM      Electronically signed by:  Josh Salgado MD 3/7/2025 11:59 AM

## 2025-03-28 PROBLEM — J10.1 INFLUENZA A: Status: RESOLVED | Noted: 2025-02-26 | Resolved: 2025-03-28

## 2025-06-09 ENCOUNTER — OFFICE VISIT (OUTPATIENT)
Dept: CARDIOLOGY CLINIC | Age: 89
End: 2025-06-09
Payer: MEDICARE

## 2025-06-09 VITALS
HEIGHT: 70 IN | SYSTOLIC BLOOD PRESSURE: 120 MMHG | DIASTOLIC BLOOD PRESSURE: 58 MMHG | OXYGEN SATURATION: 99 % | HEART RATE: 82 BPM | BODY MASS INDEX: 20.28 KG/M2

## 2025-06-09 DIAGNOSIS — E78.2 MIXED HYPERLIPIDEMIA: ICD-10-CM

## 2025-06-09 DIAGNOSIS — I25.83 CORONARY ARTERY DISEASE DUE TO LIPID RICH PLAQUE: Primary | ICD-10-CM

## 2025-06-09 DIAGNOSIS — I10 ESSENTIAL HYPERTENSION: ICD-10-CM

## 2025-06-09 DIAGNOSIS — I25.10 CORONARY ARTERY DISEASE DUE TO LIPID RICH PLAQUE: Primary | ICD-10-CM

## 2025-06-09 PROBLEM — R07.9 CHEST PAIN: Status: RESOLVED | Noted: 2021-10-16 | Resolved: 2025-06-09

## 2025-06-09 PROCEDURE — 99214 OFFICE O/P EST MOD 30 MIN: CPT | Performed by: NURSE PRACTITIONER

## 2025-06-09 PROCEDURE — 1036F TOBACCO NON-USER: CPT | Performed by: NURSE PRACTITIONER

## 2025-06-09 PROCEDURE — 1159F MED LIST DOCD IN RCRD: CPT | Performed by: NURSE PRACTITIONER

## 2025-06-09 PROCEDURE — 1123F ACP DISCUSS/DSCN MKR DOCD: CPT | Performed by: NURSE PRACTITIONER

## 2025-06-09 PROCEDURE — G8420 CALC BMI NORM PARAMETERS: HCPCS | Performed by: NURSE PRACTITIONER

## 2025-06-09 PROCEDURE — G8427 DOCREV CUR MEDS BY ELIG CLIN: HCPCS | Performed by: NURSE PRACTITIONER

## 2025-06-09 PROCEDURE — 1160F RVW MEDS BY RX/DR IN RCRD: CPT | Performed by: NURSE PRACTITIONER

## 2025-06-09 RX ORDER — VARDENAFIL HYDROCHLORIDE 10 MG/1
10 TABLET ORAL PRN
COMMUNITY

## 2025-06-09 NOTE — PROGRESS NOTES
Erectile Dysfunction      donepezil (ARICEPT) 10 MG tablet Take 1 tablet by mouth nightly      allopurinol (ZYLOPRIM) 100 MG tablet Take 0.5 tablets by mouth daily      aMILoride (MIDAMOR) 5 MG tablet Take 2 tablets by mouth daily 30 tablet 0    lamoTRIgine (LAMICTAL) 200 MG tablet Take 1 tablet by mouth daily      lithium 300 MG tablet Take 0.5 tablets by mouth daily Take half tab daily      Cyanocobalamin (B-12) 1000 MCG CAPS Take 1,000 mcg by mouth every evening      levothyroxine (SYNTHROID) 25 MCG tablet Take 1 tablet by mouth Daily      aspirin 81 MG EC tablet Take 1 tablet by mouth daily      oxyBUTYnin (DITROPAN) 5 MG tablet Take 1 tablet by mouth 3 times daily 90 tablet 0    pravastatin (PRAVACHOL) 20 MG tablet Take 1 tablet by mouth every evening      Multiple Vitamin (MULTIVITAMIN PO) Take 1 tablet by mouth daily       No current facility-administered medications for this visit.     REVIEW OF SYSTEMS:    CONSTITUTIONAL: + major weight loss, + fatigue, weakness, night sweats or fever.  HEENT: No new vision difficulties or ringing in the ears.  RESPIRATORY: No new SOB, PND, orthopnea or cough.   CARDIOVASCULAR: See HPI  GI: No nausea, vomiting, diarrhea, constipation, abdominal pain or changes in bowel habits.  : No urinary frequency, urgency, incontinence hematuria or dysuria.  Suprapubic:   ~1075 cc HS  ~500 cc during the day  SKIN: No cyanosis or skin lesions.  MUSCULOSKELETAL: No new muscle or joint pain.  NEUROLOGICAL: No syncope or TIA-like symptoms; + neuropathy making walking long distances difficult.  PSYCHIATRIC: No anxiety, pain, insomnia or depression    Objective:   PHYSICAL EXAM:       Vitals:    06/09/25 1037 06/09/25 1057   BP: 124/64 (!) 120/58   BP Site: Left Upper Arm Left Upper Arm   Patient Position: Sitting    BP Cuff Size: Medium Adult Medium Adult   Pulse: 82    SpO2: 99%    Height: 1.778 m (5' 10\")         VITALS:  BP (!) 120/58 (BP Site: Left Upper Arm, BP Cuff Size: Medium